# Patient Record
Sex: FEMALE | Race: WHITE | Employment: OTHER | URBAN - NONMETROPOLITAN AREA
[De-identification: names, ages, dates, MRNs, and addresses within clinical notes are randomized per-mention and may not be internally consistent; named-entity substitution may affect disease eponyms.]

---

## 2017-03-01 ENCOUNTER — OFFICE VISIT (OUTPATIENT)
Dept: NEPHROLOGY | Age: 72
End: 2017-03-01

## 2017-03-01 VITALS — SYSTOLIC BLOOD PRESSURE: 150 MMHG | WEIGHT: 145 LBS | DIASTOLIC BLOOD PRESSURE: 90 MMHG

## 2017-03-01 DIAGNOSIS — I10 ESSENTIAL HYPERTENSION: ICD-10-CM

## 2017-03-01 DIAGNOSIS — N05.2 MEMBRANOUS GLOMERULONEPHRITIS: ICD-10-CM

## 2017-03-01 DIAGNOSIS — R80.9 PROTEINURIA: ICD-10-CM

## 2017-03-01 DIAGNOSIS — N04.9 NEPHROTIC SYNDROME: Primary | ICD-10-CM

## 2017-03-01 PROCEDURE — G8421 BMI NOT CALCULATED: HCPCS | Performed by: INTERNAL MEDICINE

## 2017-03-01 PROCEDURE — G8400 PT W/DXA NO RESULTS DOC: HCPCS | Performed by: INTERNAL MEDICINE

## 2017-03-01 PROCEDURE — G8484 FLU IMMUNIZE NO ADMIN: HCPCS | Performed by: INTERNAL MEDICINE

## 2017-03-01 PROCEDURE — 1090F PRES/ABSN URINE INCON ASSESS: CPT | Performed by: INTERNAL MEDICINE

## 2017-03-01 PROCEDURE — 4040F PNEUMOC VAC/ADMIN/RCVD: CPT | Performed by: INTERNAL MEDICINE

## 2017-03-01 PROCEDURE — G8427 DOCREV CUR MEDS BY ELIG CLIN: HCPCS | Performed by: INTERNAL MEDICINE

## 2017-03-01 PROCEDURE — 99213 OFFICE O/P EST LOW 20 MIN: CPT | Performed by: INTERNAL MEDICINE

## 2017-03-01 PROCEDURE — 3014F SCREEN MAMMO DOC REV: CPT | Performed by: INTERNAL MEDICINE

## 2017-03-01 PROCEDURE — 1036F TOBACCO NON-USER: CPT | Performed by: INTERNAL MEDICINE

## 2017-03-01 PROCEDURE — 3017F COLORECTAL CA SCREEN DOC REV: CPT | Performed by: INTERNAL MEDICINE

## 2017-03-01 PROCEDURE — 1123F ACP DISCUSS/DSCN MKR DOCD: CPT | Performed by: INTERNAL MEDICINE

## 2017-03-01 RX ORDER — PREDNISONE 20 MG/1
20 TABLET ORAL DAILY
Qty: 60 TABLET | Refills: 5 | Status: SHIPPED | OUTPATIENT
Start: 2017-03-01 | End: 2017-06-21 | Stop reason: SDUPTHER

## 2017-06-21 ENCOUNTER — OFFICE VISIT (OUTPATIENT)
Dept: NEPHROLOGY | Age: 72
End: 2017-06-21

## 2017-06-21 VITALS
DIASTOLIC BLOOD PRESSURE: 90 MMHG | RESPIRATION RATE: 18 BRPM | HEART RATE: 76 BPM | SYSTOLIC BLOOD PRESSURE: 198 MMHG | WEIGHT: 135 LBS

## 2017-06-21 DIAGNOSIS — N04.9 NEPHROTIC SYNDROME: ICD-10-CM

## 2017-06-21 DIAGNOSIS — I10 ESSENTIAL HYPERTENSION: ICD-10-CM

## 2017-06-21 DIAGNOSIS — R80.9 PROTEINURIA: Primary | ICD-10-CM

## 2017-06-21 PROCEDURE — 3014F SCREEN MAMMO DOC REV: CPT | Performed by: INTERNAL MEDICINE

## 2017-06-21 PROCEDURE — 99214 OFFICE O/P EST MOD 30 MIN: CPT | Performed by: INTERNAL MEDICINE

## 2017-06-21 PROCEDURE — 1123F ACP DISCUSS/DSCN MKR DOCD: CPT | Performed by: INTERNAL MEDICINE

## 2017-06-21 PROCEDURE — G8421 BMI NOT CALCULATED: HCPCS | Performed by: INTERNAL MEDICINE

## 2017-06-21 PROCEDURE — G8427 DOCREV CUR MEDS BY ELIG CLIN: HCPCS | Performed by: INTERNAL MEDICINE

## 2017-06-21 PROCEDURE — 3017F COLORECTAL CA SCREEN DOC REV: CPT | Performed by: INTERNAL MEDICINE

## 2017-06-21 PROCEDURE — G8400 PT W/DXA NO RESULTS DOC: HCPCS | Performed by: INTERNAL MEDICINE

## 2017-06-21 PROCEDURE — 4040F PNEUMOC VAC/ADMIN/RCVD: CPT | Performed by: INTERNAL MEDICINE

## 2017-06-21 PROCEDURE — 1036F TOBACCO NON-USER: CPT | Performed by: INTERNAL MEDICINE

## 2017-06-21 PROCEDURE — 1090F PRES/ABSN URINE INCON ASSESS: CPT | Performed by: INTERNAL MEDICINE

## 2017-06-21 RX ORDER — PREDNISONE 10 MG/1
10 TABLET ORAL DAILY
Qty: 1 TABLET | Refills: 0
Start: 2017-06-21 | End: 2019-03-20

## 2017-08-09 DIAGNOSIS — R80.8 OTHER PROTEINURIA: Primary | ICD-10-CM

## 2017-08-09 RX ORDER — BUMETANIDE 1 MG/1
1 TABLET ORAL DAILY
Qty: 30 TABLET | Refills: 5 | Status: SHIPPED | OUTPATIENT
Start: 2017-08-09 | End: 2019-03-20 | Stop reason: ALTCHOICE

## 2017-09-12 ENCOUNTER — HOSPITAL ENCOUNTER (OUTPATIENT)
Age: 72
Discharge: HOME | End: 2017-09-12
Payer: MEDICARE

## 2017-09-12 LAB
ANION GAP SERPL CALCULATED.3IONS-SCNC: 21 MEQ/L (ref 8–16)
BUN BLDV-MCNC: 20 MG/DL (ref 7–22)
CALCIUM SERPL-MCNC: 8.9 MG/DL (ref 8.5–10.5)
CHLORIDE BLD-SCNC: 95 MEQ/L (ref 98–111)
CO2: 28 MEQ/L (ref 23–33)
CREAT SERPL-MCNC: 0.9 MG/DL (ref 0.4–1.2)
GFR SERPL CREATININE-BSD FRML MDRD: 61 ML/MIN/1.73M2
GLUCOSE BLD-MCNC: 95 MG/DL (ref 70–108)
HOURS COLLECTED: 24 HRS
POTASSIUM SERPL-SCNC: 3.5 MEQ/L (ref 3.5–5.2)
PROTEIN 24 HOUR URINE: 423 MG/24 HR (ref 42–225)
PROTEIN, URINE: 16.1 MG/DL
SODIUM BLD-SCNC: 144 MEQ/L (ref 135–145)
URINE VOLUME: 2630 ML

## 2017-09-12 PROCEDURE — 80048 BASIC METABOLIC PNL TOTAL CA: CPT

## 2017-09-12 PROCEDURE — 84156 ASSAY OF PROTEIN URINE: CPT

## 2017-09-12 PROCEDURE — 36415 COLL VENOUS BLD VENIPUNCTURE: CPT

## 2017-09-12 PROCEDURE — 81050 URINALYSIS VOLUME MEASURE: CPT

## 2017-09-20 ENCOUNTER — OFFICE VISIT (OUTPATIENT)
Dept: NEPHROLOGY | Age: 72
End: 2017-09-20
Payer: MEDICARE

## 2017-09-20 VITALS
RESPIRATION RATE: 16 BRPM | HEART RATE: 68 BPM | WEIGHT: 144 LBS | DIASTOLIC BLOOD PRESSURE: 80 MMHG | SYSTOLIC BLOOD PRESSURE: 148 MMHG

## 2017-09-20 DIAGNOSIS — N05.2 MEMBRANOUS GLOMERULONEPHRITIS: ICD-10-CM

## 2017-09-20 DIAGNOSIS — R80.1 PERSISTENT PROTEINURIA: Primary | ICD-10-CM

## 2017-09-20 DIAGNOSIS — I10 ESSENTIAL HYPERTENSION: ICD-10-CM

## 2017-09-20 PROCEDURE — 4040F PNEUMOC VAC/ADMIN/RCVD: CPT | Performed by: INTERNAL MEDICINE

## 2017-09-20 PROCEDURE — 99213 OFFICE O/P EST LOW 20 MIN: CPT | Performed by: INTERNAL MEDICINE

## 2017-09-20 PROCEDURE — 3017F COLORECTAL CA SCREEN DOC REV: CPT | Performed by: INTERNAL MEDICINE

## 2017-09-20 PROCEDURE — G8400 PT W/DXA NO RESULTS DOC: HCPCS | Performed by: INTERNAL MEDICINE

## 2017-09-20 PROCEDURE — 1090F PRES/ABSN URINE INCON ASSESS: CPT | Performed by: INTERNAL MEDICINE

## 2017-09-20 PROCEDURE — G8421 BMI NOT CALCULATED: HCPCS | Performed by: INTERNAL MEDICINE

## 2017-09-20 PROCEDURE — 3014F SCREEN MAMMO DOC REV: CPT | Performed by: INTERNAL MEDICINE

## 2017-09-20 PROCEDURE — 1036F TOBACCO NON-USER: CPT | Performed by: INTERNAL MEDICINE

## 2017-09-20 PROCEDURE — G8427 DOCREV CUR MEDS BY ELIG CLIN: HCPCS | Performed by: INTERNAL MEDICINE

## 2017-09-20 PROCEDURE — 1123F ACP DISCUSS/DSCN MKR DOCD: CPT | Performed by: INTERNAL MEDICINE

## 2018-03-15 ENCOUNTER — HOSPITAL ENCOUNTER (OUTPATIENT)
Age: 73
Discharge: HOME | End: 2018-03-15
Payer: MEDICARE

## 2018-03-15 DIAGNOSIS — R80.1 PERSISTENT PROTEINURIA: ICD-10-CM

## 2018-03-15 LAB
ANION GAP SERPL CALCULATED.3IONS-SCNC: 15 MEQ/L (ref 8–16)
BUN BLDV-MCNC: 24 MG/DL (ref 7–22)
CALCIUM SERPL-MCNC: 9.1 MG/DL (ref 8.5–10.5)
CHLORIDE BLD-SCNC: 101 MEQ/L (ref 98–111)
CO2: 25 MEQ/L (ref 23–33)
CREAT SERPL-MCNC: 1.2 MG/DL (ref 0.4–1.2)
CREATININE URINE: 124.9 MG/DL
GFR SERPL CREATININE-BSD FRML MDRD: 44 ML/MIN/1.73M2
GLUCOSE BLD-MCNC: 142 MG/DL (ref 70–108)
POTASSIUM SERPL-SCNC: 3.7 MEQ/L (ref 3.5–5.2)
PROT/CREAT RATIO, UR: 0.24
PROTEIN, URINE: 29.5 MG/DL
SODIUM BLD-SCNC: 141 MEQ/L (ref 135–145)

## 2018-03-15 PROCEDURE — 82570 ASSAY OF URINE CREATININE: CPT

## 2018-03-15 PROCEDURE — 36415 COLL VENOUS BLD VENIPUNCTURE: CPT

## 2018-03-15 PROCEDURE — 80048 BASIC METABOLIC PNL TOTAL CA: CPT

## 2018-03-15 PROCEDURE — 84156 ASSAY OF PROTEIN URINE: CPT

## 2018-03-21 ENCOUNTER — OFFICE VISIT (OUTPATIENT)
Dept: NEPHROLOGY | Age: 73
End: 2018-03-21
Payer: MEDICARE

## 2018-03-21 VITALS
HEART RATE: 68 BPM | SYSTOLIC BLOOD PRESSURE: 138 MMHG | RESPIRATION RATE: 18 BRPM | WEIGHT: 153 LBS | DIASTOLIC BLOOD PRESSURE: 80 MMHG

## 2018-03-21 DIAGNOSIS — I10 ESSENTIAL HYPERTENSION: ICD-10-CM

## 2018-03-21 DIAGNOSIS — R80.1 PERSISTENT PROTEINURIA: Primary | ICD-10-CM

## 2018-03-21 DIAGNOSIS — N05.2 MEMBRANOUS GLOMERULONEPHRITIS: ICD-10-CM

## 2018-03-21 PROCEDURE — 1036F TOBACCO NON-USER: CPT | Performed by: INTERNAL MEDICINE

## 2018-03-21 PROCEDURE — G8421 BMI NOT CALCULATED: HCPCS | Performed by: INTERNAL MEDICINE

## 2018-03-21 PROCEDURE — 1123F ACP DISCUSS/DSCN MKR DOCD: CPT | Performed by: INTERNAL MEDICINE

## 2018-03-21 PROCEDURE — 1090F PRES/ABSN URINE INCON ASSESS: CPT | Performed by: INTERNAL MEDICINE

## 2018-03-21 PROCEDURE — G8484 FLU IMMUNIZE NO ADMIN: HCPCS | Performed by: INTERNAL MEDICINE

## 2018-03-21 PROCEDURE — 4040F PNEUMOC VAC/ADMIN/RCVD: CPT | Performed by: INTERNAL MEDICINE

## 2018-03-21 PROCEDURE — G8427 DOCREV CUR MEDS BY ELIG CLIN: HCPCS | Performed by: INTERNAL MEDICINE

## 2018-03-21 PROCEDURE — 99213 OFFICE O/P EST LOW 20 MIN: CPT | Performed by: INTERNAL MEDICINE

## 2018-03-21 PROCEDURE — 3017F COLORECTAL CA SCREEN DOC REV: CPT | Performed by: INTERNAL MEDICINE

## 2018-03-21 PROCEDURE — 3014F SCREEN MAMMO DOC REV: CPT | Performed by: INTERNAL MEDICINE

## 2018-03-21 PROCEDURE — G8400 PT W/DXA NO RESULTS DOC: HCPCS | Performed by: INTERNAL MEDICINE

## 2018-03-21 NOTE — PROGRESS NOTES
0    ADVAIR DISKUS 250-50 MCG/DOSE AEPB       SPIRIVA HANDIHALER 18 MCG inhalation capsule       potassium chloride SA (K-DUR;KLOR-CON M) 20 MEQ tablet Take 1 tablet by mouth daily 30 tablet 1    losartan (COZAAR) 100 MG tablet Take 1 tablet by mouth daily 30 tablet 3    metoprolol (LOPRESSOR) 25 MG tablet Take 25 mg by mouth 3 times daily       folic acid (FOLVITE) 1 MG tablet Take 1 mg by mouth daily. No current facility-administered medications for this visit. Lab Results:    CBC:   Lab Results   Component Value Date    WBC 11.5 (H) 05/15/2013    HGB 14.8 05/15/2013    HCT 44.3 05/15/2013    MCV 98.4 05/15/2013     05/15/2013     BMP:    Lab Results   Component Value Date     03/15/2018     09/12/2017     02/17/2017    K 3.7 03/15/2018    K 3.5 09/12/2017    K 4.3 02/17/2017     03/15/2018    CL 95 (L) 09/12/2017     02/17/2017    CO2 25 03/15/2018    CO2 28 09/12/2017    CO2 27 02/17/2017    BUN 24 (H) 03/15/2018    BUN 20 09/12/2017    BUN 21 02/17/2017    CREATININE 1.2 03/15/2018    CREATININE 0.9 09/12/2017    CREATININE 0.8 02/17/2017    GLUCOSE 142 (H) 03/15/2018    GLUCOSE 95 09/12/2017    GLUCOSE 95 02/17/2017      Hepatic: No results found for: AST, ALT, ALB, BILITOT, ALKPHOS  BNP: No results found for: BNP  Lipids:   Lab Results   Component Value Date    CHOL 199 08/09/2016     08/09/2016     INR: No results found for: INR  URINE:   Lab Results   Component Value Date    PROTUR 29.5 03/15/2018     No results found for: NITRU, COLORU, PHUR, LABCAST, WBCUA, RBCUA, MUCUS, TRICHOMONAS, YEAST, BACTERIA, CLARITYU, SPECGRAV, LEUKOCYTESUR, UROBILINOGEN, BILIRUBINUR, BLOODU, GLUCOSEU, KETUA, AMORPHOUS   Microalbumen/Creatinine ratio:  No components found for: RUCREAT    Objective:   Vitals: /80   Pulse 68   Resp 18   Wt 153 lb (69.4 kg)      Constitutional:  Alert, awake, no apparent distress  Skin:normal  HEENT:Pupils are reactive . Throat

## 2019-03-13 ENCOUNTER — HOSPITAL ENCOUNTER (OUTPATIENT)
Age: 74
Discharge: HOME | End: 2019-03-13
Payer: MEDICARE

## 2019-03-13 DIAGNOSIS — R80.1 PERSISTENT PROTEINURIA: ICD-10-CM

## 2019-03-13 DIAGNOSIS — I10 ESSENTIAL HYPERTENSION: ICD-10-CM

## 2019-03-13 LAB
ANION GAP SERPL CALCULATED.3IONS-SCNC: 14 MEQ/L (ref 8–16)
BUN BLDV-MCNC: 26 MG/DL (ref 7–22)
CALCIUM SERPL-MCNC: 9.2 MG/DL (ref 8.5–10.5)
CHLORIDE BLD-SCNC: 103 MEQ/L (ref 98–111)
CO2: 24 MEQ/L (ref 23–33)
CREAT SERPL-MCNC: 0.8 MG/DL (ref 0.4–1.2)
CREATININE URINE: 94.1 MG/DL
GFR SERPL CREATININE-BSD FRML MDRD: 70 ML/MIN/1.73M2
GLUCOSE BLD-MCNC: 84 MG/DL (ref 70–108)
POTASSIUM SERPL-SCNC: 4 MEQ/L (ref 3.5–5.2)
PROT/CREAT RATIO, UR: 0.39
PROTEIN, URINE: 36.4 MG/DL
SODIUM BLD-SCNC: 141 MEQ/L (ref 135–145)

## 2019-03-13 PROCEDURE — 84156 ASSAY OF PROTEIN URINE: CPT

## 2019-03-13 PROCEDURE — 36415 COLL VENOUS BLD VENIPUNCTURE: CPT

## 2019-03-13 PROCEDURE — 80048 BASIC METABOLIC PNL TOTAL CA: CPT

## 2019-03-13 PROCEDURE — 82570 ASSAY OF URINE CREATININE: CPT

## 2019-03-20 ENCOUNTER — OFFICE VISIT (OUTPATIENT)
Dept: NEPHROLOGY | Age: 74
End: 2019-03-20
Payer: MEDICARE

## 2019-03-20 VITALS
SYSTOLIC BLOOD PRESSURE: 116 MMHG | DIASTOLIC BLOOD PRESSURE: 82 MMHG | WEIGHT: 154.2 LBS | OXYGEN SATURATION: 96 % | HEART RATE: 77 BPM

## 2019-03-20 DIAGNOSIS — N05.2 MEMBRANOUS GLOMERULONEPHRITIS: ICD-10-CM

## 2019-03-20 DIAGNOSIS — R80.1 PERSISTENT PROTEINURIA: Primary | ICD-10-CM

## 2019-03-20 DIAGNOSIS — I10 ESSENTIAL HYPERTENSION: ICD-10-CM

## 2019-03-20 PROCEDURE — G8400 PT W/DXA NO RESULTS DOC: HCPCS | Performed by: INTERNAL MEDICINE

## 2019-03-20 PROCEDURE — 99213 OFFICE O/P EST LOW 20 MIN: CPT | Performed by: INTERNAL MEDICINE

## 2019-03-20 PROCEDURE — 3017F COLORECTAL CA SCREEN DOC REV: CPT | Performed by: INTERNAL MEDICINE

## 2019-03-20 PROCEDURE — 4040F PNEUMOC VAC/ADMIN/RCVD: CPT | Performed by: INTERNAL MEDICINE

## 2019-03-20 PROCEDURE — G8421 BMI NOT CALCULATED: HCPCS | Performed by: INTERNAL MEDICINE

## 2019-03-20 PROCEDURE — 1090F PRES/ABSN URINE INCON ASSESS: CPT | Performed by: INTERNAL MEDICINE

## 2019-03-20 PROCEDURE — 1036F TOBACCO NON-USER: CPT | Performed by: INTERNAL MEDICINE

## 2019-03-20 PROCEDURE — G8427 DOCREV CUR MEDS BY ELIG CLIN: HCPCS | Performed by: INTERNAL MEDICINE

## 2019-03-20 PROCEDURE — 1101F PT FALLS ASSESS-DOCD LE1/YR: CPT | Performed by: INTERNAL MEDICINE

## 2019-03-20 PROCEDURE — G8484 FLU IMMUNIZE NO ADMIN: HCPCS | Performed by: INTERNAL MEDICINE

## 2019-03-20 PROCEDURE — 1123F ACP DISCUSS/DSCN MKR DOCD: CPT | Performed by: INTERNAL MEDICINE

## 2019-03-20 RX ORDER — AMLODIPINE BESYLATE 5 MG/1
5 TABLET ORAL DAILY
COMMUNITY
Start: 2019-03-10

## 2019-03-20 RX ORDER — PREDNISONE 1 MG/1
5 TABLET ORAL DAILY
COMMUNITY
End: 2019-09-18 | Stop reason: ALTCHOICE

## 2019-03-20 RX ORDER — FAMOTIDINE 40 MG/1
40 TABLET, FILM COATED ORAL DAILY
COMMUNITY
Start: 2019-03-10 | End: 2020-10-28

## 2019-03-20 RX ORDER — CITALOPRAM 20 MG/1
20 TABLET ORAL DAILY
COMMUNITY
Start: 2019-03-11

## 2019-03-20 RX ORDER — OMEPRAZOLE 20 MG/1
20 CAPSULE, DELAYED RELEASE ORAL DAILY
COMMUNITY
Start: 2019-03-10 | End: 2020-10-28

## 2019-03-20 RX ORDER — UMECLIDINIUM 62.5 UG/1
AEROSOL, POWDER ORAL
Status: ON HOLD | COMMUNITY
Start: 2019-03-10 | End: 2020-07-16 | Stop reason: SDUPTHER

## 2019-09-09 ENCOUNTER — HOSPITAL ENCOUNTER (OUTPATIENT)
Age: 74
Discharge: HOME | End: 2019-09-09
Payer: MEDICARE

## 2019-09-09 DIAGNOSIS — I10 ESSENTIAL HYPERTENSION: ICD-10-CM

## 2019-09-09 DIAGNOSIS — R80.1 PERSISTENT PROTEINURIA: ICD-10-CM

## 2019-09-09 LAB
ANION GAP SERPL CALCULATED.3IONS-SCNC: 16 MEQ/L (ref 8–16)
BUN BLDV-MCNC: 22 MG/DL (ref 7–22)
CALCIUM SERPL-MCNC: 9.6 MG/DL (ref 8.5–10.5)
CHLORIDE BLD-SCNC: 100 MEQ/L (ref 98–111)
CO2: 24 MEQ/L (ref 23–33)
CREAT SERPL-MCNC: 0.7 MG/DL (ref 0.4–1.2)
CREATININE URINE: 92.1 MG/DL
GFR SERPL CREATININE-BSD FRML MDRD: 82 ML/MIN/1.73M2
GLUCOSE BLD-MCNC: 112 MG/DL (ref 70–108)
POTASSIUM SERPL-SCNC: 4.1 MEQ/L (ref 3.5–5.2)
PROT/CREAT RATIO, UR: 0.33
PROTEIN, URINE: 30.8 MG/DL
SODIUM BLD-SCNC: 140 MEQ/L (ref 135–145)

## 2019-09-09 PROCEDURE — 80048 BASIC METABOLIC PNL TOTAL CA: CPT

## 2019-09-09 PROCEDURE — 82570 ASSAY OF URINE CREATININE: CPT

## 2019-09-09 PROCEDURE — 36415 COLL VENOUS BLD VENIPUNCTURE: CPT

## 2019-09-09 PROCEDURE — 84156 ASSAY OF PROTEIN URINE: CPT

## 2019-09-18 ENCOUNTER — OFFICE VISIT (OUTPATIENT)
Dept: NEPHROLOGY | Age: 74
End: 2019-09-18
Payer: MEDICARE

## 2019-09-18 VITALS
HEIGHT: 64 IN | BODY MASS INDEX: 24.41 KG/M2 | HEART RATE: 68 BPM | WEIGHT: 143 LBS | DIASTOLIC BLOOD PRESSURE: 80 MMHG | SYSTOLIC BLOOD PRESSURE: 138 MMHG

## 2019-09-18 DIAGNOSIS — I10 ESSENTIAL HYPERTENSION: ICD-10-CM

## 2019-09-18 DIAGNOSIS — N05.2 MEMBRANOUS GLOMERULONEPHRITIS: ICD-10-CM

## 2019-09-18 DIAGNOSIS — R80.1 PERSISTENT PROTEINURIA: Primary | ICD-10-CM

## 2019-09-18 PROCEDURE — G8420 CALC BMI NORM PARAMETERS: HCPCS | Performed by: INTERNAL MEDICINE

## 2019-09-18 PROCEDURE — 1123F ACP DISCUSS/DSCN MKR DOCD: CPT | Performed by: INTERNAL MEDICINE

## 2019-09-18 PROCEDURE — G8400 PT W/DXA NO RESULTS DOC: HCPCS | Performed by: INTERNAL MEDICINE

## 2019-09-18 PROCEDURE — 3017F COLORECTAL CA SCREEN DOC REV: CPT | Performed by: INTERNAL MEDICINE

## 2019-09-18 PROCEDURE — G8427 DOCREV CUR MEDS BY ELIG CLIN: HCPCS | Performed by: INTERNAL MEDICINE

## 2019-09-18 PROCEDURE — 1090F PRES/ABSN URINE INCON ASSESS: CPT | Performed by: INTERNAL MEDICINE

## 2019-09-18 PROCEDURE — 1036F TOBACCO NON-USER: CPT | Performed by: INTERNAL MEDICINE

## 2019-09-18 PROCEDURE — 99213 OFFICE O/P EST LOW 20 MIN: CPT | Performed by: INTERNAL MEDICINE

## 2019-09-18 PROCEDURE — 4040F PNEUMOC VAC/ADMIN/RCVD: CPT | Performed by: INTERNAL MEDICINE

## 2019-09-18 RX ORDER — LORAZEPAM 2 MG/1
2 TABLET ORAL EVERY 6 HOURS PRN
COMMUNITY

## 2019-09-18 RX ORDER — METHYLPREDNISOLONE 4 MG/1
2 TABLET ORAL DAILY
Status: ON HOLD | COMMUNITY
End: 2020-07-16 | Stop reason: ALTCHOICE

## 2019-09-18 NOTE — PROGRESS NOTES
Renal Progress Note    Assessment and Plan:      Diagnosis Orders   1. Persistent proteinuria     2. Essential hypertension     3. Membranous glomerulonephritis       PLAN:   Lab results discussed with the patient. She understood. Serum creatinine is good at 0.8 mg/dL. Urine protein with creatinine ratio is improved to 300 mg from 390 mg before. Medications reviewed. Discontinue omeprazole since patient is also on famotidine. Return visit in 12 months with labs. Patient Active Problem List   Diagnosis    Nephrotic syndrome    HTN (hypertension)    Lower extremity edema    HLD (hyperlipidemia)    Membranous glomerulonephritis    Proteinuria    Anemia    Dyslipidemia    Edema of both legs    Dyslipidemia    Hyponatremia       Subjective:   Chief complaint:  Chief Complaint   Patient presents with    Chronic Kidney Disease      HPI:This is a follow up visit for Mrs Ericka Miguel here today for a return appointment. See her for proteinuria . Was last seen about 6 months ago. .  Urine protein creatinine ratio was 0.39 g. Today it is 0.33 g. Doing well with no complaints.     ROS:Constitutional: negative  Eyes: negative  Ears, nose, mouth, throat, and face: negative  Respiratory: negative  Cardiovascular: negative  Gastrointestinal: negative  Genitourinary:negative  Integument/breast: negative  Hematologic/lymphatic: negative  Musculoskeletal:positive for arthralgias and stiff joints  Neurological: negative  Behavioral/Psych: negative  Endocrine: negative  Allergic/Immunologic: negative     Medications:     Current Outpatient Medications   Medication Sig Dispense Refill    amLODIPine (NORVASC) 5 MG tablet Take 5 mg by mouth daily       citalopram (CELEXA) 20 MG tablet Take 20 mg by mouth daily       famotidine (PEPCID) 40 MG tablet Take 40 mg by mouth daily       omeprazole (PRILOSEC) 20 MG delayed release capsule Take 20 mg by mouth Daily       INCRUSE ELLIPTA 62.5 MCG/INH AEPB      

## 2020-03-25 PROBLEM — E78.5 HLD (HYPERLIPIDEMIA): Status: RESOLVED | Noted: 2020-03-25 | Resolved: 2020-03-24

## 2020-03-25 PROBLEM — E78.5 DYSLIPIDEMIA: Status: RESOLVED | Noted: 2020-03-25 | Resolved: 2020-03-24

## 2020-07-12 ENCOUNTER — HOSPITAL ENCOUNTER (INPATIENT)
Age: 75
LOS: 2 days | Discharge: HOME HEALTH | DRG: 190 | End: 2020-07-16
Attending: INTERNAL MEDICINE | Admitting: INTERNAL MEDICINE
Payer: MEDICARE

## 2020-07-12 ENCOUNTER — APPOINTMENT (OUTPATIENT)
Dept: GENERAL RADIOLOGY | Age: 75
DRG: 190 | End: 2020-07-12
Payer: MEDICARE

## 2020-07-12 PROBLEM — J44.1 COPD EXACERBATION (HCC): Status: ACTIVE | Noted: 2020-07-12

## 2020-07-12 LAB
ALBUMIN SERPL-MCNC: 4.1 G/DL (ref 3.5–5.1)
ALLEN TEST: POSITIVE
ALP BLD-CCNC: 82 U/L (ref 38–126)
ALT SERPL-CCNC: 26 U/L (ref 11–66)
ANION GAP SERPL CALCULATED.3IONS-SCNC: 13 MEQ/L (ref 8–16)
AST SERPL-CCNC: 35 U/L (ref 5–40)
BASE EXCESS (CALCULATED): -0.1 MMOL/L (ref -2.5–2.5)
BASOPHILS # BLD: 0.6 %
BASOPHILS ABSOLUTE: 0.1 THOU/MM3 (ref 0–0.1)
BILIRUB SERPL-MCNC: 0.2 MG/DL (ref 0.3–1.2)
BILIRUBIN DIRECT: < 0.2 MG/DL (ref 0–0.3)
BUN BLDV-MCNC: 22 MG/DL (ref 7–22)
CALCIUM SERPL-MCNC: 8.6 MG/DL (ref 8.5–10.5)
CHLORIDE BLD-SCNC: 98 MEQ/L (ref 98–111)
CO2: 23 MEQ/L (ref 23–33)
COLLECTED BY:: ABNORMAL
CREAT SERPL-MCNC: 0.7 MG/DL (ref 0.4–1.2)
DEVICE: ABNORMAL
EKG ATRIAL RATE: 101 BPM
EKG P AXIS: 106 DEGREES
EKG P-R INTERVAL: 188 MS
EKG Q-T INTERVAL: 356 MS
EKG QRS DURATION: 80 MS
EKG QTC CALCULATION (BAZETT): 461 MS
EKG R AXIS: 60 DEGREES
EKG T AXIS: 93 DEGREES
EKG VENTRICULAR RATE: 101 BPM
EOSINOPHIL # BLD: 2.4 %
EOSINOPHILS ABSOLUTE: 0.3 THOU/MM3 (ref 0–0.4)
ERYTHROCYTE [DISTWIDTH] IN BLOOD BY AUTOMATED COUNT: 11.5 % (ref 11.5–14.5)
ERYTHROCYTE [DISTWIDTH] IN BLOOD BY AUTOMATED COUNT: 43.5 FL (ref 35–45)
GFR SERPL CREATININE-BSD FRML MDRD: 81 ML/MIN/1.73M2
GLUCOSE BLD-MCNC: 123 MG/DL (ref 70–108)
HCO3: 26 MMOL/L (ref 23–28)
HCT VFR BLD CALC: 40.3 % (ref 37–47)
HEMOGLOBIN: 13.2 GM/DL (ref 12–16)
IFIO2: 3
IMMATURE GRANS (ABS): 0.09 THOU/MM3 (ref 0–0.07)
IMMATURE GRANULOCYTES: 0.8 %
INR BLD: 0.89 (ref 0.85–1.13)
LYMPHOCYTES # BLD: 24.7 %
LYMPHOCYTES ABSOLUTE: 2.9 THOU/MM3 (ref 1–4.8)
MCH RBC QN AUTO: 33.7 PG (ref 26–33)
MCHC RBC AUTO-ENTMCNC: 32.8 GM/DL (ref 32.2–35.5)
MCV RBC AUTO: 102.8 FL (ref 81–99)
MONOCYTES # BLD: 8.3 %
MONOCYTES ABSOLUTE: 1 THOU/MM3 (ref 0.4–1.3)
NUCLEATED RED BLOOD CELLS: 0 /100 WBC
O2 SATURATION: 92 %
OSMOLALITY CALCULATION: 272.9 MOSMOL/KG (ref 275–300)
PCO2: 45 MMHG (ref 35–45)
PH BLOOD GAS: 7.36 (ref 7.35–7.45)
PLATELET # BLD: 310 THOU/MM3 (ref 130–400)
PMV BLD AUTO: 8.5 FL (ref 9.4–12.4)
PO2: 67 MMHG (ref 71–104)
POTASSIUM SERPL-SCNC: 4.2 MEQ/L (ref 3.5–5.2)
RBC # BLD: 3.92 MILL/MM3 (ref 4.2–5.4)
REASON FOR REJECTION: NORMAL
REJECTED TEST: NORMAL
SARS-COV-2, NAAT: NOT DETECTED
SEG NEUTROPHILS: 63.2 %
SEGMENTED NEUTROPHILS ABSOLUTE COUNT: 7.5 THOU/MM3 (ref 1.8–7.7)
SODIUM BLD-SCNC: 134 MEQ/L (ref 135–145)
SOURCE, BLOOD GAS: ABNORMAL
TOTAL PROTEIN: 6.7 G/DL (ref 6.1–8)
WBC # BLD: 11.9 THOU/MM3 (ref 4.8–10.8)

## 2020-07-12 PROCEDURE — 93005 ELECTROCARDIOGRAM TRACING: CPT | Performed by: PHYSICIAN ASSISTANT

## 2020-07-12 PROCEDURE — 94761 N-INVAS EAR/PLS OXIMETRY MLT: CPT

## 2020-07-12 PROCEDURE — 80053 COMPREHEN METABOLIC PANEL: CPT

## 2020-07-12 PROCEDURE — 36600 WITHDRAWAL OF ARTERIAL BLOOD: CPT

## 2020-07-12 PROCEDURE — 6360000002 HC RX W HCPCS: Performed by: INTERNAL MEDICINE

## 2020-07-12 PROCEDURE — 82248 BILIRUBIN DIRECT: CPT

## 2020-07-12 PROCEDURE — G0378 HOSPITAL OBSERVATION PER HR: HCPCS

## 2020-07-12 PROCEDURE — 85025 COMPLETE CBC W/AUTO DIFF WBC: CPT

## 2020-07-12 PROCEDURE — 96372 THER/PROPH/DIAG INJ SC/IM: CPT

## 2020-07-12 PROCEDURE — 36415 COLL VENOUS BLD VENIPUNCTURE: CPT

## 2020-07-12 PROCEDURE — 6370000000 HC RX 637 (ALT 250 FOR IP): Performed by: INTERNAL MEDICINE

## 2020-07-12 PROCEDURE — 2580000003 HC RX 258: Performed by: INTERNAL MEDICINE

## 2020-07-12 PROCEDURE — 94640 AIRWAY INHALATION TREATMENT: CPT

## 2020-07-12 PROCEDURE — 6360000002 HC RX W HCPCS: Performed by: PHYSICIAN ASSISTANT

## 2020-07-12 PROCEDURE — 85610 PROTHROMBIN TIME: CPT

## 2020-07-12 PROCEDURE — 94645 CONT INHLJ TX EACH ADDL HOUR: CPT

## 2020-07-12 PROCEDURE — 96374 THER/PROPH/DIAG INJ IV PUSH: CPT

## 2020-07-12 PROCEDURE — 99284 EMERGENCY DEPT VISIT MOD MDM: CPT

## 2020-07-12 PROCEDURE — 96376 TX/PRO/DX INJ SAME DRUG ADON: CPT

## 2020-07-12 PROCEDURE — 6370000000 HC RX 637 (ALT 250 FOR IP): Performed by: PHYSICIAN ASSISTANT

## 2020-07-12 PROCEDURE — 82803 BLOOD GASES ANY COMBINATION: CPT

## 2020-07-12 PROCEDURE — U0002 COVID-19 LAB TEST NON-CDC: HCPCS

## 2020-07-12 PROCEDURE — 71045 X-RAY EXAM CHEST 1 VIEW: CPT

## 2020-07-12 PROCEDURE — 2700000000 HC OXYGEN THERAPY PER DAY

## 2020-07-12 PROCEDURE — 94644 CONT INHLJ TX 1ST HOUR: CPT

## 2020-07-12 RX ORDER — BUDESONIDE AND FORMOTEROL FUMARATE DIHYDRATE 160; 4.5 UG/1; UG/1
2 AEROSOL RESPIRATORY (INHALATION) 2 TIMES DAILY
Status: DISCONTINUED | OUTPATIENT
Start: 2020-07-12 | End: 2020-07-13

## 2020-07-12 RX ORDER — SODIUM CHLORIDE 0.9 % (FLUSH) 0.9 %
10 SYRINGE (ML) INJECTION PRN
Status: DISCONTINUED | OUTPATIENT
Start: 2020-07-12 | End: 2020-07-16 | Stop reason: HOSPADM

## 2020-07-12 RX ORDER — POLYETHYLENE GLYCOL 3350 17 G/17G
17 POWDER, FOR SOLUTION ORAL DAILY PRN
Status: DISCONTINUED | OUTPATIENT
Start: 2020-07-12 | End: 2020-07-16 | Stop reason: HOSPADM

## 2020-07-12 RX ORDER — PREDNISONE 20 MG/1
40 TABLET ORAL DAILY
Status: DISCONTINUED | OUTPATIENT
Start: 2020-07-14 | End: 2020-07-13

## 2020-07-12 RX ORDER — LOSARTAN POTASSIUM 100 MG/1
100 TABLET ORAL DAILY
Status: DISCONTINUED | OUTPATIENT
Start: 2020-07-12 | End: 2020-07-16 | Stop reason: HOSPADM

## 2020-07-12 RX ORDER — IPRATROPIUM BROMIDE AND ALBUTEROL SULFATE 2.5; .5 MG/3ML; MG/3ML
2 SOLUTION RESPIRATORY (INHALATION) ONCE
Status: COMPLETED | OUTPATIENT
Start: 2020-07-12 | End: 2020-07-12

## 2020-07-12 RX ORDER — IPRATROPIUM BROMIDE AND ALBUTEROL SULFATE 2.5; .5 MG/3ML; MG/3ML
1 SOLUTION RESPIRATORY (INHALATION)
Status: DISCONTINUED | OUTPATIENT
Start: 2020-07-12 | End: 2020-07-16 | Stop reason: HOSPADM

## 2020-07-12 RX ORDER — METHYLPREDNISOLONE SODIUM SUCCINATE 40 MG/ML
40 INJECTION, POWDER, LYOPHILIZED, FOR SOLUTION INTRAMUSCULAR; INTRAVENOUS EVERY 6 HOURS
Status: DISCONTINUED | OUTPATIENT
Start: 2020-07-12 | End: 2020-07-13

## 2020-07-12 RX ORDER — ACETAMINOPHEN 325 MG/1
650 TABLET ORAL EVERY 6 HOURS PRN
Status: DISCONTINUED | OUTPATIENT
Start: 2020-07-12 | End: 2020-07-16 | Stop reason: HOSPADM

## 2020-07-12 RX ORDER — SODIUM CHLORIDE 0.9 % (FLUSH) 0.9 %
10 SYRINGE (ML) INJECTION EVERY 12 HOURS SCHEDULED
Status: DISCONTINUED | OUTPATIENT
Start: 2020-07-12 | End: 2020-07-16 | Stop reason: HOSPADM

## 2020-07-12 RX ORDER — ONDANSETRON 2 MG/ML
4 INJECTION INTRAMUSCULAR; INTRAVENOUS EVERY 6 HOURS PRN
Status: DISCONTINUED | OUTPATIENT
Start: 2020-07-12 | End: 2020-07-16 | Stop reason: HOSPADM

## 2020-07-12 RX ORDER — PANTOPRAZOLE SODIUM 40 MG/1
40 TABLET, DELAYED RELEASE ORAL
Status: DISCONTINUED | OUTPATIENT
Start: 2020-07-12 | End: 2020-07-16 | Stop reason: HOSPADM

## 2020-07-12 RX ORDER — PROMETHAZINE HYDROCHLORIDE 25 MG/1
12.5 TABLET ORAL EVERY 6 HOURS PRN
Status: DISCONTINUED | OUTPATIENT
Start: 2020-07-12 | End: 2020-07-16 | Stop reason: HOSPADM

## 2020-07-12 RX ORDER — AMLODIPINE BESYLATE 5 MG/1
5 TABLET ORAL DAILY
Status: DISCONTINUED | OUTPATIENT
Start: 2020-07-12 | End: 2020-07-16 | Stop reason: HOSPADM

## 2020-07-12 RX ORDER — FAMOTIDINE 20 MG/1
40 TABLET, FILM COATED ORAL DAILY
Status: DISCONTINUED | OUTPATIENT
Start: 2020-07-12 | End: 2020-07-12

## 2020-07-12 RX ORDER — ACETAMINOPHEN 650 MG/1
650 SUPPOSITORY RECTAL EVERY 6 HOURS PRN
Status: DISCONTINUED | OUTPATIENT
Start: 2020-07-12 | End: 2020-07-16 | Stop reason: HOSPADM

## 2020-07-12 RX ORDER — FOLIC ACID 1 MG/1
1 TABLET ORAL DAILY
Status: DISCONTINUED | OUTPATIENT
Start: 2020-07-12 | End: 2020-07-16 | Stop reason: HOSPADM

## 2020-07-12 RX ORDER — CITALOPRAM 20 MG/1
20 TABLET ORAL DAILY
Status: DISCONTINUED | OUTPATIENT
Start: 2020-07-12 | End: 2020-07-16 | Stop reason: HOSPADM

## 2020-07-12 RX ADMIN — IPRATROPIUM BROMIDE AND ALBUTEROL SULFATE 1 AMPULE: .5; 3 SOLUTION RESPIRATORY (INHALATION) at 12:43

## 2020-07-12 RX ADMIN — Medication 10 ML: at 16:45

## 2020-07-12 RX ADMIN — METHYLPREDNISOLONE SODIUM SUCCINATE 40 MG: 40 INJECTION, POWDER, FOR SOLUTION INTRAMUSCULAR; INTRAVENOUS at 23:45

## 2020-07-12 RX ADMIN — METHYLPREDNISOLONE SODIUM SUCCINATE 40 MG: 40 INJECTION, POWDER, FOR SOLUTION INTRAMUSCULAR; INTRAVENOUS at 16:45

## 2020-07-12 RX ADMIN — IPRATROPIUM BROMIDE AND ALBUTEROL SULFATE 1 AMPULE: .5; 3 SOLUTION RESPIRATORY (INHALATION) at 20:25

## 2020-07-12 RX ADMIN — ALBUTEROL SULFATE 15 MG/HR: 2.5 SOLUTION RESPIRATORY (INHALATION) at 05:44

## 2020-07-12 RX ADMIN — IPRATROPIUM BROMIDE AND ALBUTEROL SULFATE 2 AMPULE: .5; 3 SOLUTION RESPIRATORY (INHALATION) at 04:17

## 2020-07-12 RX ADMIN — FOLIC ACID 1 MG: 1 TABLET ORAL at 11:38

## 2020-07-12 RX ADMIN — PANTOPRAZOLE SODIUM 40 MG: 40 TABLET, DELAYED RELEASE ORAL at 09:59

## 2020-07-12 RX ADMIN — ACETAMINOPHEN 650 MG: 325 TABLET ORAL at 09:59

## 2020-07-12 RX ADMIN — AMLODIPINE BESYLATE 5 MG: 5 TABLET ORAL at 11:38

## 2020-07-12 RX ADMIN — SODIUM CHLORIDE, PRESERVATIVE FREE 10 ML: 5 INJECTION INTRAVENOUS at 20:07

## 2020-07-12 RX ADMIN — ENOXAPARIN SODIUM 40 MG: 40 INJECTION SUBCUTANEOUS at 09:59

## 2020-07-12 RX ADMIN — Medication 2 PUFF: at 20:25

## 2020-07-12 RX ADMIN — SODIUM CHLORIDE, PRESERVATIVE FREE 10 ML: 5 INJECTION INTRAVENOUS at 10:01

## 2020-07-12 RX ADMIN — ALBUTEROL SULFATE 15 MG/HR: 2.5 SOLUTION RESPIRATORY (INHALATION) at 04:51

## 2020-07-12 RX ADMIN — METOPROLOL TARTRATE 25 MG: 25 TABLET ORAL at 11:38

## 2020-07-12 RX ADMIN — METHYLPREDNISOLONE SODIUM SUCCINATE 40 MG: 40 INJECTION, POWDER, FOR SOLUTION INTRAMUSCULAR; INTRAVENOUS at 11:39

## 2020-07-12 RX ADMIN — METOPROLOL TARTRATE 25 MG: 25 TABLET ORAL at 20:06

## 2020-07-12 RX ADMIN — IPRATROPIUM BROMIDE AND ALBUTEROL SULFATE 1 AMPULE: .5; 3 SOLUTION RESPIRATORY (INHALATION) at 16:19

## 2020-07-12 RX ADMIN — LOSARTAN POTASSIUM 100 MG: 100 TABLET, FILM COATED ORAL at 11:38

## 2020-07-12 RX ADMIN — CITALOPRAM 20 MG: 20 TABLET, FILM COATED ORAL at 11:38

## 2020-07-12 ASSESSMENT — PAIN DESCRIPTION - DESCRIPTORS: DESCRIPTORS: HEADACHE

## 2020-07-12 ASSESSMENT — PAIN DESCRIPTION - PROGRESSION: CLINICAL_PROGRESSION: GRADUALLY WORSENING

## 2020-07-12 ASSESSMENT — PAIN SCALES - GENERAL
PAINLEVEL_OUTOF10: 0
PAINLEVEL_OUTOF10: 3
PAINLEVEL_OUTOF10: 0

## 2020-07-12 ASSESSMENT — PAIN - FUNCTIONAL ASSESSMENT: PAIN_FUNCTIONAL_ASSESSMENT: ACTIVITIES ARE NOT PREVENTED

## 2020-07-12 ASSESSMENT — PAIN DESCRIPTION - FREQUENCY: FREQUENCY: CONTINUOUS

## 2020-07-12 ASSESSMENT — PAIN DESCRIPTION - LOCATION: LOCATION: HEAD

## 2020-07-12 ASSESSMENT — PAIN DESCRIPTION - PAIN TYPE: TYPE: ACUTE PAIN

## 2020-07-12 ASSESSMENT — PAIN DESCRIPTION - ONSET: ONSET: ON-GOING

## 2020-07-12 ASSESSMENT — PAIN DESCRIPTION - ORIENTATION: ORIENTATION: MID

## 2020-07-12 NOTE — PLAN OF CARE
Problem: Impaired respiratory status  Goal: Clear lung sounds  Description: Clear lung sounds  Outcome: Ongoing     Patient to ED with copd exacerbation, patient given 2 duoneb treatments and a 2 hour continuous treatment, will continue to monitor for improvement.

## 2020-07-12 NOTE — ED NOTES
Reassessment of the patients Shortness of Breath   has moderately improved, the patients pain reassessment is a 0/10, Side rails up times 2, call light in reach, will continue to monitor. Resp @28/min with aerosal in progress. Patient awakened when entered room. Pt urinated in bedpan.   States \"I feel better\"     Mau Gomez RN  07/12/20 6744

## 2020-07-12 NOTE — H&P
[address] [place] [address]                              HISTORY AND PHYSICAL    PATIENT NAME: Adeline Carranza                 :        1945  MED REC NO:   [id number]                           ROOM:       0022  ACCOUNT NO:   [de-identified]                           ADMIT DATE: 2020  PROVIDER:     Jimmy Porter. Carmen Finch M.D. PRESENTING COMPLAINT:  Shortness of breath. HISTORY OF PRESENT ILLNESS:  The patient is 67years old who lives with  her handicap son who was in her usual state of health until yesterday  when she developed some shortness of breath. She dates that back to  Friday night/Saturday morning, however. The patient smokes about a half  a dozen of cigarettes a day, which she has been doing for many years,  but this shortness of breath was unaccompanied by any fever or chills. She did have some dry cough with this. She also denies any pleuritic  chest pain with this. She has not noticed any problem with her  breathing a week earlier also, but thought that this may have come on  her relatively suddenly. The patient was brought into the emergency  department after she called 911. The patient was given about two-hour  aerosolized treatment in the ED with some improvement and then admitted  for our service. She states to me that since she came in last night,  she is already feeling somewhat better. She did not have any home O2  prior to this as well. PAST MEDICAL HISTORY:  She does have medical history though remarkable  for hypertension, nephrotic syndrome, hyperlipidemia, anemia. PAST SURGICAL HISTORY:  No pertinent surgical history. MEDICATIONS:  Listed include p.r.n. Ativan, Norvasc, Celexa, Pepcid,  Prilosec, Advair Diskus, Spiriva, cortisol, Lopressor,  methylprednisolone. SOCIAL HISTORY:  She is , just lost her  in 2020 and  has _____ who is handicapped as well.     REVIEW OF SYSTEMS:  Mostly pulmonary with these changes as described;  otherwise, appetite is fair. No constipation or diarrhea. The patient  would deny any orthopnea or PND or any dysuria, urgency or hesitancy or  any hematuria or hematochezia. Denies any pleuritic chest pain or  fever. PHYSICAL EXAMINATION:  GENERAL:  I found an elderly patient in bed this morning with O2 in  place; otherwise, appears stable. VITAL SIGNS:  Most recent vital signs showing blood pressure of 145/62  with a pulse of 105; respirations 26; temperature was 98.2, last taken. NECK:  Supple. Trachea is central.  LUNGS:  Moderate air movement, still experiencing some expiratory  wheezes. CARDIOVASCULAR SYSTEM:  Showed diffuse PMI. S1 and S2 well heard. No  gallop. ABDOMEN:  Soft and there is no tenderness. Positive bowel sounds. No  palpable enlarged organs. NEUROLOGIC:  The patient is alert, awake, responsive to commands  appropriately without any focal deficits. EXTREMITIES:  Shows no edema. SKIN:  Shows some areas of bruises with loose skin. AVAILABLE DIAGNOSTIC DATA:  Include electrolyte with a BUN of 22,  creatinine is 0.7, sodium is 134, potassium 4.2, chloride is 98, CO2 of  23, glucose of 123. LFTs are acceptable. CBC:  White count is 11.9,  hemoglobin is 13.2, hematocrit is 40.3 with a platelet count of 996. Initial blood gas showed a pH 7.3, pCO2 of 45, O2 of 67, saturation of  92%. Chest x-ray shows no acute pneumonic infiltrates, some  interstitial changes to suggest pulmonary fibrosis only. ASSESSMENT AND PLAN:  1. COPD exacerbation. The patient has had some degree of obstructive  airway disease, continues to smoke cigarettes and then went into the  exacerbation. The patient is being treated now with aggressive  treatments including the use of steroid and we will continue this  aggressive mode for now. She is somewhat feeling better and we will try  to ambulate the patient as well.   2.  History of chronic kidney disease, stable condition. We will  monitor. 3.  Hypertension, on medication. We will resume medication as well. 4.  Anticipate short stay in the hospital, we will discharge soon. [address] [place] [address]  Timi Lenz M.D.    D: 07/12/2020 11:05:04       T: 07/12/2020 11:54:43     [email or web address]  Job#: [id number]     Doc#: [id number]    CC:

## 2020-07-12 NOTE — PLAN OF CARE
Problem: Impaired respiratory status  Goal: Clear lung sounds  Description: Clear lung sounds  7/12/2020 1249 by Donte Delaney RCP  Outcome: Ongoing  Note: Pt started on aerosols to clear lung sounds/improve aeration, for maintenance of COPD and pt does MDI's at home.

## 2020-07-12 NOTE — H&P
Dr. Virginia Mccoy ( [facility] )  H&P  7/12/2020  10:48 AM    Patient:  Mike Michelle  YOB: 1945    MRN: [id number]   Acct:  [de-identified]   8B-22/022-A  Primary Care Physician: AUSTIN Lagunas CNP  Over 45 mins spent on this evaluation.   dictated    Electronically signed by Lev Lagunas MD on 7/12/2020 at 10:48 AM         Copy: Primary Care Physician: AUSTIN Lagunas CNP

## 2020-07-12 NOTE — PLAN OF CARE
Problem: Impaired respiratory status  Goal: Clear lung sounds  Description: Clear lung sounds  7/12/2020 1625 by Sondra Lucero  Outcome: Ongoing  7/12/2020 1249 by Gabriel Garcia RCP  Outcome: Ongoing  Note: Pt started on aerosols to clear lung sounds/improve aeration, for maintenance of COPD and pt does MDI's at home.    7/12/2020 1038 by Shefali Smith RN  Outcome: Ongoing  7/12/2020 0538 by Lukas Iverson RCP  Outcome: Ongoing     Problem: Breathing Pattern - Ineffective:  Goal: Ability to achieve and maintain a regular respiratory rate will improve  Description: Ability to achieve and maintain a regular respiratory rate will improve  7/12/2020 1625 by Sondra Lucero  Outcome: Ongoing  7/12/2020 1038 by Shefali Smith RN  Outcome: Ongoing  Note: Dyspnea noted with exertion and at rest.       Problem: Gas Exchange - Impaired:  Goal: Levels of oxygenation will improve  Description: Levels of oxygenation will improve  7/12/2020 1625 by Sondra Lucero  Outcome: Ongoing  7/12/2020 1038 by Shefali Smith RN  Outcome: Ongoing

## 2020-07-12 NOTE — ED PROVIDER NOTES
Reason for Visit: Shortness of Breath      HISTORY OF PRESENT ILLNESS       HPI: This 67 y.o. femalepresents to the emergency department for shortness of breath ongoing for several hours. Patient states when she went to bed she noticed it began with increasing shortness of breath. Patient is not on home oxygen. Patient does continue to smoke. Patient denies chest pain back pain fevers chills headache blurred vision weakness. Patient states she has not been admitted into the hospital.  Patient did receive multiple nebulized treatments as well as Solu-Medrol 125 mg IV prior to arrival by EMS. No other complaints. Past Medical History:   Diagnosis Date    Anemia     Dyslipidemia     HLD (hyperlipidemia)     HTN (hypertension)     Lower extremity edema     Membranous glomerulonephritis     Nephrotic syndrome     Proteinuria        No past surgical history on file.     Radhika Yanes   Social History     Socioeconomic History    Marital status:      Spouse name: Not on file    Number of children: Not on file    Years of education: Not on file    Highest education level: Not on file   Occupational History    Not on file   Social Needs    Financial resource strain: Not on file    Food insecurity     Worry: Not on file     Inability: Not on file    Transportation needs     Medical: Not on file     Non-medical: Not on file   Tobacco Use    Smoking status: Current Every Day Smoker     Packs/day: 0.50     Years: 40.00     Pack years: 20.00     Types: Cigarettes     Last attempt to quit: 2011     Years since quittin.1    Smokeless tobacco: Never Used   Substance and Sexual Activity    Alcohol use: Not on file    Drug use: Never    Sexual activity: Not on file   Lifestyle    Physical activity     Days per week: Not on file     Minutes per session: Not on file    Stress: Not on file   Relationships    Social connections     Talks on phone: Not on file     Gets together: Not on file     Attends Zoroastrianism service: Not on file     Active member of club or organization: Not on file     Attends meetings of clubs or organizations: Not on file     Relationship status: Not on file    Intimate partner violence     Fear of current or ex partner: Not on file     Emotionally abused: Not on file     Physically abused: Not on file     Forced sexual activity: Not on file   Other Topics Concern    Not on file   Social History Narrative    Not on file       Previous Medications    ADVAIR DISKUS 250-50 MCG/DOSE AEPB        AMLODIPINE (NORVASC) 5 MG TABLET    Take 5 mg by mouth daily     CITALOPRAM (CELEXA) 20 MG TABLET    Take 20 mg by mouth daily     FAMOTIDINE (PEPCID) 40 MG TABLET    Take 40 mg by mouth daily     FOLIC ACID (FOLVITE) 1 MG TABLET    Take 1 mg by mouth daily. INCRUSE ELLIPTA 62.5 MCG/INH AEPB        LORAZEPAM (ATIVAN) 2 MG TABLET    Take 2 mg by mouth every 6 hours as needed for Anxiety. LOSARTAN (COZAAR) 100 MG TABLET    Take 1 tablet by mouth daily    METHYLPREDNISOLONE (MEDROL) 4 MG TABLET    Take 2 mg by mouth daily    METOPROLOL (LOPRESSOR) 25 MG TABLET    Take 25 mg by mouth 2 times daily     OMEPRAZOLE (PRILOSEC) 20 MG DELAYED RELEASE CAPSULE    Take 20 mg by mouth Daily     SPIRIVA HANDIHALER 18 MCG INHALATION CAPSULE           Allergies: Allergies as of 07/12/2020    (No Known Allergies)       Review of Systems       See HPI for further details. At least 10 systems reviewed and are otherwise negative unless noted in the history of present illness.      Constitutional: Denies fever or chills   Eyes: Denies change in visual acuity   HENT: Denies nasal congestion or sore throat   Respiratory: Denies cough admits shortness of breath   Cardiovascular: Denies chest pain or edema   GI: Denies abdominal pain, nausea, vomiting, bloody stools or diarrhea   Musculoskeletal: Denies back pain or joint pain   Integument: Denies rash   Neurologic: Denies headache, focal weakness or Grans (Abs) 0.09 (*)     All other components within normal limits   BASIC METABOLIC PANEL - Abnormal; Notable for the following components:    Sodium 134 (*)     Glucose 123 (*)     All other components within normal limits   HEPATIC FUNCTION PANEL - Abnormal; Notable for the following components: Total Bilirubin 0.2 (*)     All other components within normal limits   BLOOD GAS, ARTERIAL - Abnormal; Notable for the following components:    PO2 67 (*)     All other components within normal limits   GLOMERULAR FILTRATION RATE, ESTIMATED - Abnormal; Notable for the following components:    Est, Glom Filt Rate 81 (*)     All other components within normal limits   OSMOLALITY - Abnormal; Notable for the following components:    Osmolality Calc 272.9 (*)     All other components within normal limits   PROTIME-INR   ANION GAP   COVID-19     Medications   albuterol (PROVENTIL) nebulizer solution (15 mg/hr Nebulization New Bag 7/12/20 6033)   ipratropium-albuterol (DUONEB) nebulizer solution 2 ampule (2 ampules Inhalation Given 7/12/20 4266)     New Prescriptions    No medications on file         Counseling     The emergency provider has spoken with the patient and discussed today's findings, in addition to providing specific details for the plan of care. Questions are answered and there is agreement with the plan. This patient was seen under the direct supervision of the attending physician who was available for consultation. Differential Diagnosis   COPD versus COVID versus pneumonia    Consults: Discussed with hospitalist agrees to evaluate for admission. Reevaluation:  Patient with some improvement after multiple albuterol and DuoNeb nebulization treatments. Patient is currently undergoing a 2-hour albuterol nebulized treatment. Patient with increased work of breathing. Will advocate for admission.     DECISION to ADMIT / DISCHARGE:     5:36 AM    Clinical Impression       1.   1. COPD exacerbation ([facility] [address]    2. Hypoxia        Disposition       All results were shared with the patient, medical decision making was discussed and all questions were answered, and she agreed to assessment and plan. Patient was ADMITTED to the hospital based on concerning ED workup. At this point in time, I believe the patient has the mental capacity to make medical decisions. Critical care time. Because of high probability of sudden clinical deterioration of the patient's condition or further deterioration, critical care time including my full attention to the patient's condition including chart data reviewed documentation, medication ordering, reviewing the patient's old records, reevaluation of patient's cardiac, pulmonary and neurological status. Reassessment of vital signs, consultations with ED attending and/or specialists, ordering, interpreting reviewing diagnostic testing. Therefore my critical care time was 45 minutes of direct attention to the patient condition which did not include time spent on procedures.         Darren Adamsma  07/12/20 5313

## 2020-07-12 NOTE — ED NOTES
ED to inpatient nurses report    Chief Complaint   Patient presents with    Shortness of Breath      Present to ED from home  LOC: alert and orientated to name, place, date  Vital signs   Vitals:    07/12/20 0415 07/12/20 0424 07/12/20 0510 07/12/20 0606   BP: (!) 180/91  128/85 98/82   Pulse: 102  91 97   Resp: (!) 44  24 29   Temp: 98.1 °F (36.7 °C)      TempSrc: Oral      SpO2: 95% 92% 96% 96%   Weight: 138 lb (62.6 kg)      Height: 5' 5\" (1.651 m)         Oxygen Baseline  95%    Current needs required none     Bipap/Cpap No  LDAs:    Mobility: Independent  Pending ED orders: none  Present condition: improved - stable    Electronically signed by Fang Eng RN on 7/12/2020 at 6:41 AM     Krishan Vazquez RN  07/12/20 8219

## 2020-07-12 NOTE — PROGRESS NOTES
Pt admitted to  8AB22 via via cart/stretcher from ED. Complaints: Shortness of breath. IV none infusing into the wrist right, condition patent and no redness at a rate of 0 mls/ hour. IV site free of s/s of infection or infiltration. Vital signs obtained. Assessment and data collection initiated. Two nurse skin assessment performed by Othelia Meigs and Saumya YAP. Oriented to room. Policies and procedures for 8AB explained. All questions answered with no further questions at this time. Fall prevention and safety brochure discussed with patient. Bed alarm on. Call light in reach. Oriented to room. Irene Briceño RN 7/12/2020 2:56 PM     Explained patients right to have family, representative or physician notified of their admission. Patient has Declined for physician to be notified. Patient has Declined for family/representative to be notified. Patient would like family notified once per shift?  No

## 2020-07-12 NOTE — ED NOTES
Patient resting quietly with resp @ 28/min with less resp effort with aerosal treatments     Ajay Ramey RN  07/12/20 8895

## 2020-07-12 NOTE — ED NOTES
Bed: 008A  Expected date: 7/12/20  Expected time: 3:55 AM  Means of arrival: Yaa  Comments:  COPD exac     Oleg Anthony RN  07/12/20 1137

## 2020-07-13 ENCOUNTER — APPOINTMENT (OUTPATIENT)
Dept: GENERAL RADIOLOGY | Age: 75
DRG: 190 | End: 2020-07-13
Payer: MEDICARE

## 2020-07-13 PROBLEM — Z72.0 NICOTINE ABUSE: Status: ACTIVE | Noted: 2020-07-13

## 2020-07-13 LAB
BASOPHILS # BLD: 0.2 %
BASOPHILS ABSOLUTE: 0 THOU/MM3 (ref 0–0.1)
EKG ATRIAL RATE: 75 BPM
EKG P AXIS: 66 DEGREES
EKG P-R INTERVAL: 172 MS
EKG Q-T INTERVAL: 390 MS
EKG QRS DURATION: 84 MS
EKG QTC CALCULATION (BAZETT): 435 MS
EKG R AXIS: 45 DEGREES
EKG T AXIS: 64 DEGREES
EKG VENTRICULAR RATE: 75 BPM
EOSINOPHIL # BLD: 0 %
EOSINOPHILS ABSOLUTE: 0 THOU/MM3 (ref 0–0.4)
ERYTHROCYTE [DISTWIDTH] IN BLOOD BY AUTOMATED COUNT: 11.7 % (ref 11.5–14.5)
ERYTHROCYTE [DISTWIDTH] IN BLOOD BY AUTOMATED COUNT: 43.8 FL (ref 35–45)
HCT VFR BLD CALC: 37.2 % (ref 37–47)
HEMOGLOBIN: 12.1 GM/DL (ref 12–16)
IMMATURE GRANS (ABS): 0.14 THOU/MM3 (ref 0–0.07)
IMMATURE GRANULOCYTES: 1.1 %
LYMPHOCYTES # BLD: 5.2 %
LYMPHOCYTES ABSOLUTE: 0.7 THOU/MM3 (ref 1–4.8)
MCH RBC QN AUTO: 33.4 PG (ref 26–33)
MCHC RBC AUTO-ENTMCNC: 32.5 GM/DL (ref 32.2–35.5)
MCV RBC AUTO: 102.8 FL (ref 81–99)
MONOCYTES # BLD: 6.2 %
MONOCYTES ABSOLUTE: 0.8 THOU/MM3 (ref 0.4–1.3)
NUCLEATED RED BLOOD CELLS: 0 /100 WBC
PLATELET # BLD: 287 THOU/MM3 (ref 130–400)
PMV BLD AUTO: 8.8 FL (ref 9.4–12.4)
RBC # BLD: 3.62 MILL/MM3 (ref 4.2–5.4)
SEG NEUTROPHILS: 87.3 %
SEGMENTED NEUTROPHILS ABSOLUTE COUNT: 10.9 THOU/MM3 (ref 1.8–7.7)
WBC # BLD: 12.5 THOU/MM3 (ref 4.8–10.8)

## 2020-07-13 PROCEDURE — 93010 ELECTROCARDIOGRAM REPORT: CPT | Performed by: NUCLEAR MEDICINE

## 2020-07-13 PROCEDURE — 93005 ELECTROCARDIOGRAM TRACING: CPT | Performed by: INTERNAL MEDICINE

## 2020-07-13 PROCEDURE — 94640 AIRWAY INHALATION TREATMENT: CPT

## 2020-07-13 PROCEDURE — 6360000002 HC RX W HCPCS: Performed by: INTERNAL MEDICINE

## 2020-07-13 PROCEDURE — G0378 HOSPITAL OBSERVATION PER HR: HCPCS

## 2020-07-13 PROCEDURE — 97166 OT EVAL MOD COMPLEX 45 MIN: CPT

## 2020-07-13 PROCEDURE — 2580000003 HC RX 258: Performed by: INTERNAL MEDICINE

## 2020-07-13 PROCEDURE — 94760 N-INVAS EAR/PLS OXIMETRY 1: CPT

## 2020-07-13 PROCEDURE — 96372 THER/PROPH/DIAG INJ SC/IM: CPT

## 2020-07-13 PROCEDURE — 36415 COLL VENOUS BLD VENIPUNCTURE: CPT

## 2020-07-13 PROCEDURE — 97163 PT EVAL HIGH COMPLEX 45 MIN: CPT

## 2020-07-13 PROCEDURE — 2700000000 HC OXYGEN THERAPY PER DAY

## 2020-07-13 PROCEDURE — 85025 COMPLETE CBC W/AUTO DIFF WBC: CPT

## 2020-07-13 PROCEDURE — 97116 GAIT TRAINING THERAPY: CPT

## 2020-07-13 PROCEDURE — 96376 TX/PRO/DX INJ SAME DRUG ADON: CPT

## 2020-07-13 PROCEDURE — 99222 1ST HOSP IP/OBS MODERATE 55: CPT | Performed by: INTERNAL MEDICINE

## 2020-07-13 PROCEDURE — 71046 X-RAY EXAM CHEST 2 VIEWS: CPT

## 2020-07-13 PROCEDURE — 97535 SELF CARE MNGMENT TRAINING: CPT

## 2020-07-13 PROCEDURE — 6370000000 HC RX 637 (ALT 250 FOR IP): Performed by: INTERNAL MEDICINE

## 2020-07-13 RX ORDER — METHYLPREDNISOLONE SODIUM SUCCINATE 40 MG/ML
40 INJECTION, POWDER, LYOPHILIZED, FOR SOLUTION INTRAMUSCULAR; INTRAVENOUS EVERY 12 HOURS
Status: COMPLETED | OUTPATIENT
Start: 2020-07-14 | End: 2020-07-15

## 2020-07-13 RX ORDER — NICOTINE 21 MG/24HR
1 PATCH, TRANSDERMAL 24 HOURS TRANSDERMAL EVERY 24 HOURS
Status: DISCONTINUED | OUTPATIENT
Start: 2020-07-13 | End: 2020-07-16 | Stop reason: HOSPADM

## 2020-07-13 RX ORDER — ALPRAZOLAM 0.25 MG/1
0.25 TABLET ORAL EVERY 4 HOURS PRN
Status: DISCONTINUED | OUTPATIENT
Start: 2020-07-13 | End: 2020-07-16 | Stop reason: HOSPADM

## 2020-07-13 RX ADMIN — SODIUM CHLORIDE, PRESERVATIVE FREE 10 ML: 5 INJECTION INTRAVENOUS at 09:20

## 2020-07-13 RX ADMIN — IPRATROPIUM BROMIDE AND ALBUTEROL SULFATE 1 AMPULE: .5; 3 SOLUTION RESPIRATORY (INHALATION) at 08:12

## 2020-07-13 RX ADMIN — SODIUM CHLORIDE, PRESERVATIVE FREE 10 ML: 5 INJECTION INTRAVENOUS at 20:55

## 2020-07-13 RX ADMIN — METHYLPREDNISOLONE SODIUM SUCCINATE 40 MG: 40 INJECTION, POWDER, FOR SOLUTION INTRAMUSCULAR; INTRAVENOUS at 12:04

## 2020-07-13 RX ADMIN — IPRATROPIUM BROMIDE AND ALBUTEROL SULFATE 1 AMPULE: .5; 3 SOLUTION RESPIRATORY (INHALATION) at 16:29

## 2020-07-13 RX ADMIN — CITALOPRAM 20 MG: 20 TABLET, FILM COATED ORAL at 09:19

## 2020-07-13 RX ADMIN — IPRATROPIUM BROMIDE AND ALBUTEROL SULFATE 1 AMPULE: .5; 3 SOLUTION RESPIRATORY (INHALATION) at 12:47

## 2020-07-13 RX ADMIN — LOSARTAN POTASSIUM 100 MG: 100 TABLET, FILM COATED ORAL at 09:19

## 2020-07-13 RX ADMIN — METHYLPREDNISOLONE SODIUM SUCCINATE 40 MG: 40 INJECTION, POWDER, FOR SOLUTION INTRAMUSCULAR; INTRAVENOUS at 05:06

## 2020-07-13 RX ADMIN — IPRATROPIUM BROMIDE AND ALBUTEROL SULFATE 1 AMPULE: .5; 3 SOLUTION RESPIRATORY (INHALATION) at 20:10

## 2020-07-13 RX ADMIN — IPRATROPIUM BROMIDE AND ALBUTEROL SULFATE 1 AMPULE: .5; 3 SOLUTION RESPIRATORY (INHALATION) at 04:48

## 2020-07-13 RX ADMIN — Medication 2 PUFF: at 08:13

## 2020-07-13 RX ADMIN — ALPRAZOLAM 0.25 MG: 0.25 TABLET ORAL at 05:24

## 2020-07-13 RX ADMIN — ENOXAPARIN SODIUM 40 MG: 40 INJECTION SUBCUTANEOUS at 12:03

## 2020-07-13 RX ADMIN — ALPRAZOLAM 0.25 MG: 0.25 TABLET ORAL at 20:54

## 2020-07-13 RX ADMIN — PANTOPRAZOLE SODIUM 40 MG: 40 TABLET, DELAYED RELEASE ORAL at 05:24

## 2020-07-13 RX ADMIN — METOPROLOL TARTRATE 25 MG: 25 TABLET ORAL at 20:54

## 2020-07-13 RX ADMIN — METOPROLOL TARTRATE 25 MG: 25 TABLET ORAL at 09:19

## 2020-07-13 RX ADMIN — FOLIC ACID 1 MG: 1 TABLET ORAL at 09:19

## 2020-07-13 RX ADMIN — METHYLPREDNISOLONE SODIUM SUCCINATE 40 MG: 40 INJECTION, POWDER, FOR SOLUTION INTRAMUSCULAR; INTRAVENOUS at 18:10

## 2020-07-13 RX ADMIN — AMLODIPINE BESYLATE 5 MG: 5 TABLET ORAL at 09:19

## 2020-07-13 ASSESSMENT — PAIN SCALES - GENERAL
PAINLEVEL_OUTOF10: 0
PAINLEVEL_OUTOF10: 0

## 2020-07-13 NOTE — FLOWSHEET NOTE
07/13/20 0516   Provider Notification   Reason for Communication New orders   Provider Name Dr. Enid Barba   Provider Notification Physician   Method of Communication Secure Message   Response Waiting for response   Notification Time [id number]   Dr. Enid Barba notified of pt being very anxious. New orders given for Xanax 0.25 mg every 6 hours, as needed.

## 2020-07-13 NOTE — CONSULTS
[facility]      Patient - Boston Farrell   MRN -  [id number]   Acct # - [de-identified]   - 1945      Date of Admission -  2020  4:01 AM  Date of evaluation -  2020  Room - [address] [place] [address] [place], [place] Primary Care Physician - AUSTIN Cid CNP     Problem List      Active Hospital Problems    Diagnosis Date Noted    Nicotine abuse [Z72.0] 2020    COPD exacerbation ([address] [J44.1] 2020    HTN (hypertension) [I10]      Reason for Consult    For management of COPD and exacerbation. HPI   History Obtained From: Patient and electronic medical record. Boston Farrell is a 67 y.o. female  was initially admitted under Dr. Aicha Huston service. Pulmonary medicine was consulted for further management of COPD/exacerbation. The patient is a 67 y.o. female with past medical hx of tobacco smoking for 50years with 2PPD, still smoking 4 to 5 cigarettes per day. She was diagnosed with COPD in the past. She used to follow with Dr. Lizabeth Blandon in the past. She used to be Advair discuss 250/50, Incruse Ellipta along with Spiriva Handihaler at home. She never used to be on home O2 in the past. She presented with worsening of shortness of breath for the last 3 days. Her current illness started as upper respiratory tract infection with cough, cold and sputum  production which is white in color. She noticed worsening of shortness of breath with decline in functional status. For the last 2 days she not able to attend her regular activities at home. She start using her rescue inhalers/nebulizations more frequently at home with no significant improvement in her short ness of breath or cough. Due to worsening of her above symptoms she presented to the Emergency room at [facility] by calling 911.  She was evaluated in the Emergency room by ER physician and admitted under MD Blanca service for further evaluation. She had a Hx of fall on her right side of chest in December 24th, 2017. She subsequently developed fracture of right side ribs leading to ? Hemothorax. She signed her self from [facility] in 2008. She was subsequently admitted to [facility]. She underwent chest tube placement with subsequent removal. ? Fibrothorax. The patient is a 67 y.o. female who presented with worsening of shortness of breath; She is having shortness of breath: Yes  Onset: gradual   Duration: 3days. Diurnal variation: None. Current functional capacity on level ground: Very limited on level ground. She denies orthopnea. She denies paroxysmal nocturnal dyspnea. Her shortness of breath is associated with cough and wheezing. She is having cough: Yes  Duration of cough: for 3 days. Her cough is associated with sputum production: Yes   The sputum color: white  Hemoptysis:No  Diurnal variation: None. She gives a history of fever: No  Chills & rigors:No  Associated night sweats: No.  Recent travel history:No.    Rrecent sick contacts: No.        PMHx   Past Medical History      Diagnosis Date    Anemia     Dyslipidemia     HLD (hyperlipidemia)     HTN (hypertension)     Lower extremity edema     Membranous glomerulonephritis     Nephrotic syndrome     Proteinuria       Past Surgical History    History reviewed. No pertinent surgical history.   Meds    Current Medications    nicotine  1 patch Transdermal Q24H    sodium chloride flush  10 mL Intravenous 2 times per day    enoxaparin  40 mg Subcutaneous Q24H    ipratropium-albuterol  1 ampule Inhalation Q4H WA    methylPREDNISolone  40 mg Intravenous Q6H    Followed by   Yoan Mccoy ON 7/14/2020] predniSONE  40 mg Oral Daily    amLODIPine  5 mg Oral Daily    citalopram  20 mg Oral Daily    folic acid  1 mg Oral Daily    losartan  100 mg Oral Daily    metoprolol tartrate  25 mg Oral BID    budesonide-formoterol  2 puff Inhalation BID    pantoprazole  40 mg Oral QAM AC     ALPRAZolam, sodium chloride flush, acetaminophen **OR** acetaminophen, polyethylene glycol, promethazine **OR** ondansetron  IV Drips/Infusions    Home Medications  Medications Prior to Admission: LORazepam (ATIVAN) 2 MG tablet, Take 2 mg by mouth every 6 hours as needed for Anxiety. amLODIPine (NORVASC) 5 MG tablet, Take 5 mg by mouth daily   citalopram (CELEXA) 20 MG tablet, Take 20 mg by mouth daily   famotidine (PEPCID) 40 MG tablet, Take 40 mg by mouth daily   omeprazole (PRILOSEC) 20 MG delayed release capsule, Take 20 mg by mouth Daily   INCRUSE ELLIPTA 62.5 MCG/INH AEPB,   ADVAIR DISKUS 250-50 MCG/DOSE AEPB,   SPIRIVA HANDIHALER 18 MCG inhalation capsule,   losartan (COZAAR) 100 MG tablet, Take 1 tablet by mouth daily  metoprolol (LOPRESSOR) 25 MG tablet, Take 25 mg by mouth 2 times daily   folic acid (FOLVITE) 1 MG tablet, Take 1 mg by mouth daily. methylPREDNISolone (MEDROL) 4 MG tablet, Take 2 mg by mouth daily  Diet    DIET GENERAL;  Allergies    Patient has no known allergies. Family History    History reviewed. No pertinent family history.   Sleep History    Never diagnosed with sleep apnea in the past    Social History     Social History     Socioeconomic History    Marital status:      Spouse name: Not on file    Number of children: Not on file    Years of education: Not on file    Highest education level: Not on file   Occupational History    Not on file   Social Needs    Financial resource strain: Not on file    Food insecurity     Worry: Not on file     Inability: Not on file    Transportation needs     Medical: Not on file     Non-medical: Not on file   Tobacco Use    Smoking status: Current Every Day Smoker     Packs/day: 0.50     Years: 40.00     Pack years: 20.00     Types: Cigarettes     Last attempt to quit: 2011     Years since quittin.1    Smokeless tobacco: Never Used   Substance and Sexual Activity    Alcohol use: Yes     Alcohol/week: 0.0 standard drinks     Comment: 2 beers/day    Drug use: Never    Sexual activity: Not on file   Lifestyle    Physical activity     Days per week: Not on file     Minutes per session: Not on file    Stress: Not on file   Relationships    Social connections     Talks on phone: Not on file     Gets together: Not on file     Attends Spiritism service: Not on file     Active member of club or organization: Not on file     Attends meetings of clubs or organizations: Not on file     Relationship status: Not on file    Intimate partner violence     Fear of current or ex partner: Not on file     Emotionally abused: Not on file     Physically abused: Not on file     Forced sexual activity: Not on file   Other Topics Concern    Not on file   Social History Narrative    Not on file       Riview of systems   General/Constitutional: No recent loss of weight with poor appetite. No fever or chills. HENT: Negative. Eyes: Negative. Upper respiratory tract: No nasal stuffiness with no post nasal drip. Lower respiratory tract/ lungs: See HPI for details. No hemoptysis. Cardiovascular: No palpitations or chest pain. Gastrointestinal: No nausea or vomiting. Neurological: No focal neurologiacal weakness. Extremities: No edema. Musculoskeletal: No complaints. Genitourinary: No complaints. Hematological: Negative. Psychiatric/Behavioral: Negative. Skin: No itching. Vitals     height is 5' 5\" (1.651 m) and weight is 140 lb 3.2 oz (63.6 kg). Her oral temperature is 98.3 °F (36.8 °C). Her blood pressure is 140/66 (abnormal) and her pulse is 93. Her respiration is 16 and oxygen saturation is 93%. Body mass index is 23.33 kg/m². SUPPLEMENTAL O2: O2 Flow Rate (L/min): 1 L/min     I/O        Intake/Output Summary (Last 24 hours) at 7/13/2020 1708  Last data filed at 7/13/2020 0920  Gross per 24 hour   Intake 450 ml   Output --   Net 450 ml     I/O last 3 completed shifts:   In: input(s): LACTA in the last 72 hours. INR  Recent Labs     07/12/20  0430   INR 0.89     PTT  No results for input(s): APTT in the last 72 hours. Glucose  No results for input(s): POCGLU in the last 72 hours. UA No results for input(s): SPECGRAV, PHUR, COLORU, CLARITYU, MUCUS, PROTEINU, BLOODU, RBCUA, WBCUA, BACTERIA, NITRU, GLUCOSEU, BILIRUBINUR, UROBILINOGEN, KETUA, LABCAST, LABCASTTY, AMORPHOS in the last 72 hours. Invalid input(s): CRYSTALS. PFTs   None in [facility]    Sleep studies   None in Epic    Cultures    None in Epic  -COVID-19 test ( SARS-CoV-2) is Negative/None detected on: 7/12/2020      EKG     Echocardiogram   None in Epic    Radiology    CXR  Jul 13, 2020   PROCEDURE: XR CHEST (2 VW)   No acute cardiopulmonary disease. Stable appearance of the chest compared to the prior study as detailed above. CT Scans  (See actual reports for details)  CT chest done in 1/4/2008:  No official report is available in [place]. Assessment   -Acute exacerbation of COPD due to acute bronchitis with viral Vs bacterial etiolgy.  -Chronic Hx of tobacco abuse- she is still smoking 4 to 5 cigarettes per day. -Hx of fall on her right side of chest in December 24th, 2017. She subsequently developed fracture of right side ribs leading to ? Hemothorax. She signed her self from [facility] in [facility]. She was subsequently admitted to [facility]. She underwent chest tube placement with subsequent removal. ? Fibrothorax. -HTN (hypertension). -Nephrotic syndrome      Plan   -Will continue Solumedrol to 40mg IVBID  -Sputum cultures. -Duonebs 3ml via nebs Q4h while awake. -Will hold off on Symbicort.  -Bed side spirometry. -CT chest with IV contrast to further evaluate right side lung opacity.  -Will resume Advair,Incruse and albuterol at the time of discharge.  -Home O2 eval prior to discharge. - Patient educated to quit smoking as soon as possible.  Patient verbalizes understanding of adverse consequences of tobacco smoking.  -Titrate Oxygen to keep Spo2 >90%. -Deep Venous Thrombosis Prophylaxis: lovenox. \"Thank you for asking us to see this patient\"   . - Shaji Ambriz about my impression and plan. She verbalizes understanding. Questions and concerns addressed.     Electronically signed by   Claribel May MD on 7/13/2020 at 5:08 PM

## 2020-07-13 NOTE — CARE COORDINATION
DISCHARGE/PLANNING EVALUATION  7/13/20, 2:53 PM EDT    Reason for Referral: \"requestes [facility] for nursing\"  Mental Status: Patient is alert and oriented  Decision Making: Patient makes own decisions  Family/Social/Home Environment: Spoke with patient, assessment completed. Patient's handicapped son lives with her in a 2 story with full basement. Her bedroom and bathroom are on the main floor. She has a walk-in/roll-in shower that is handicapped accessible with bench and grab bars. Her daughter has been picking up groceries since COVID began. She states she is independent with ADL's, household chores, cooking and driving. She has a lawn services mow. Current Services including food security, transportation and housekeeping: Not current with [facility] services, needs met. Current Equipment:none  Payment Source:Medicare and BCBS  Concerns or Barriers to Discharge: Patient would like [facility], nursing. Post acute provider list with quality measures, geographic area and applicable managed care information provided. Questions regarding selection process answered: declined list, wants [facility]. Teach Back Method used with patient regarding care plan and discharge planning. Patient  verbalize understanding of the plan of care and contribute to goal setting. Patient goals, treatment preferences and discharge plan: Patient plans home with son and new [facility], nursing.     Electronically signed by WING Lyman on 7/13/2020 at 2:53 PM

## 2020-07-13 NOTE — PROGRESS NOTES
mobility    Equipment Recommendations:  Equipment Needed: No    Plan:  Times per week: 3-5x  Current Treatment Recommendations: Strengthening, Endurance Training, Patient/Caregiver Education & Training, Self-Care / ADL, Balance Training, Functional Mobility Training, Safety Education & Training. See long-term goal time frame for expected duration of plan of care. If no long-term goals established, a short length of stay is anticipated. Goals:  Patient goals : go home  Short term goals  Time Frame for Short term goals: by discharge  Short term goal 1: Pt to complete BADL routine with SBA and no vcs for safety  Short term goal 2: Pt to be educated on energy conservation tech and demo good follow through of 1-2 tech with no vcs `         Following session, patient left in safe position with all fall risk precautions in place.

## 2020-07-13 NOTE — PLAN OF CARE
Problem: Impaired respiratory status  Goal: Clear lung sounds  Description: Clear lung sounds  7/13/2020 0932 by Riccardo Wilde RN  Outcome: Ongoing  Note: Respirations easy and unlabored. SOB with exertion. Expiratory wheezes. Problem: Falls - Risk of:  Goal: Will remain free from falls  Description: Will remain free from falls  7/13/2020 0932 by Riccardo Wilde RN  Outcome: Ongoing  Note: Patient free from falls. Bed alarm, chair alarm, call light within reach, non skid footwear on when up. Problem: Falls - Risk of:  Goal: Absence of physical injury  Description: Absence of physical injury  7/13/2020 0932 by Riccardo Wilde RN  Outcome: Ongoing  Note: Patient free from falls. Bed alarm, chair alarm, call light within reach, non skid footwear on when up. Problem: Discharge Planning:  Goal: Discharged to appropriate level of care  Description: Discharged to appropriate level of care  7/13/2020 0932 by Riccardo Wilde RN  Outcome: Ongoing  Note: Plans to return home with spouse at discharge. Problem: Activity Intolerance:  Goal: Ability to tolerate increased activity will improve  Description: Ability to tolerate increased activity will improve  7/13/2020 0932 by Riccardo Wilde RN  Outcome: Ongoing  Note: Continues to work with therapy. Still getting SOB with exertion. Problem: Breathing Pattern - Ineffective:  Goal: Ability to achieve and maintain a regular respiratory rate will improve  Description: Ability to achieve and maintain a regular respiratory rate will improve  7/13/2020 0932 by Riccardo Wilde RN  Outcome: Ongoing  Note: Respirations easy and unlabored. SOB with exertion. Expiratory wheezes.      Problem: Gas Exchange - Impaired:  Goal: Levels of oxygenation will improve  Description: Levels of oxygenation will improve  7/13/2020 0932 by Riccardo Wilde RN  Outcome: Ongoing  Note: Continues on 1L of oxygen per NC, oxygen sats 92-93%     Care plan reviewed with patient. Patient verbalize understanding of the plan of care and contribute to goal setting.

## 2020-07-13 NOTE — PLAN OF CARE
Problem: Impaired respiratory status  Goal: Clear lung sounds  Description: Clear lung sounds  7/13/2020 0042 by Tova Anaya RN  Outcome: Ongoing  Note: Pt has expiratory wheezes throughout. Breathing treatments given per order. Problem: Falls - Risk of:  Goal: Will remain free from falls  Description: Will remain free from falls  Outcome: Ongoing  Note: Absence of falls this shift. Fall prevention in place. Fall band applied. Bed alarm activated as needed. Problem: Falls - Risk of:  Goal: Absence of physical injury  Description: Absence of physical injury  Outcome: Ongoing  Note: Absence of physical injury. Problem: Discharge Planning:  Goal: Discharged to appropriate level of care  Description: Discharged to appropriate level of care  Outcome: Ongoing  Note: Pt plans to be discharged home with son when appropriate. Problem: Activity Intolerance:  Goal: Ability to tolerate increased activity will improve  Description: Ability to tolerate increased activity will improve  Outcome: Ongoing  Note: Pt very short of breath with exertion. PT/OT to evaluate in morning. Problem: Breathing Pattern - Ineffective:  Goal: Ability to achieve and maintain a regular respiratory rate will improve  Description: Ability to achieve and maintain a regular respiratory rate will improve  7/13/2020 0042 by Tova Anaya RN  Outcome: Ongoing  Note: Pt very short of breath with exertion. IV steroids, and breathing treatments given per order. Problem: Gas Exchange - Impaired:  Goal: Levels of oxygenation will improve  Description: Levels of oxygenation will improve  7/13/2020 0042 by Tova Anaya RN  Outcome: Ongoing  Note: Pt oxygen saturation remains above 90% on 2 Liters of Oxygen. Care plan reviewed with patient. Patient verbalize understanding of the plan of care and contribute to goal setting.

## 2020-07-13 NOTE — PROGRESS NOTES
Dr. Nusrat Arana note       Internal Medicine              Patient:  Dann Garcia  YOB: 1945    MRN: [id number]    Acct:  [de-identified]   [id number]  Primary Care Physician: AUSTIN Burnette CNP    Admit Date: 7/12/2020           Subjective: had an episode of anxiety. Better with the med but will consider nicotine replacement. Objective:      Physical Exam:    Vitals:  Patient Vitals for the past 24 hrs:   BP Temp Temp src Pulse Resp SpO2 Weight   07/13/20 0915 (!) 144/76 98.3 °F (36.8 °C) Oral 91 20 92 % --   07/13/20 0448 -- -- -- -- -- 91 % --   07/13/20 0403 -- 98.3 °F (36.8 °C) Oral 97 24 93 % 140 lb 3.2 oz (63.6 kg)   07/12/20 2343 (!) 149/67 98.3 °F (36.8 °C) Oral 75 22 96 % --   07/12/20 2026 -- -- -- -- 18 93 % --   07/12/20 2004 (!) 143/64 98.4 °F (36.9 °C) Oral 89 18 96 % --   07/12/20 1619 -- -- -- -- 18 95 % --   07/12/20 1533 (!) 176/80 98.3 °F (36.8 °C) Oral 91 16 95 % --     Weight: Weight: 140 lb 3.2 oz (63.6 kg)     24 hour intake/output:    Intake/Output Summary (Last 24 hours) at 7/13/2020 1217  Last data filed at 7/13/2020 0920  Gross per 24 hour   Intake 630 ml   Output --   Net 630 ml       General appearance - alert,oriented to person, place  NECK:  Supple. Trachea is central.  LUNGS:  Moderate air movement, still experiencing some expiratory  wheezes. CARDIOVASCULAR SYSTEM:  Showed diffuse PMI. S1 and S2 well heard. No  gallop. ABDOMEN:  Soft and there is no tenderness. Positive bowel sounds. No  palpable enlarged organs. NEUROLOGIC:  The patient is alert, awake, responsive to commands  appropriately without any focal deficits. EXTREMITIES:  Shows no edema. SKIN:  Shows some areas of bruises with loose skin.       Review of Labs and Diagnostic Testing:    CBC:   Recent Labs     07/13/20  0359   WBC 12.5*   HGB 12.1   HCT 37.2   .8*        BMP:   Recent Labs     07/12/20  8840 *   K 4.2   CL 98   CO2 23   BUN 22   CREATININE 0.7   CALCIUM 8.6   GLUCOSE 123*     PT/INR:   Recent Labs     07/12/20 0430   INR 0.89     APTT: No results for input(s): APTT in the last 72 hours. Lipids:   Recent Labs     07/12/20 0430   ALKPHOS 82   ALT 26   AST 35   BILITOT 0.2*   BILIDIR <0.2   LABALBU 4.1     Troponin: No results for input(s): TROPONINI in the last 72 hours. Imaging:  Xr Chest Standard (2 Vw)    Result Date: 7/13/2020  PROCEDURE: XR CHEST (2 VW) CLINICAL INFORMATION: sob. COMPARISON: Chest x-ray dated 7/12/2020 TECHNIQUE: Frontal and lateral views of the chest were obtained. FINDINGS: Lungs/pleura: Lungs are hyperinflated consistent with COPD. No pneumonia, pulmonary edema, or obvious mass. Interstitium remains prominent consistent with pulmonary fibrosis. No pleural effusion. No pneumothorax. Heart: There is stable mild cardiomegaly. Mediastinum/geoffrey: No obvious mass or adenopathy. Skeleton: Again noted are multiple old healed right rib fractures. There is an old mild to moderate T12 compression fracture deformity. No acute cardiopulmonary disease. Stable appearance of the chest compared to the prior study as detailed above. **This report has been created using voice recognition software. It may contain minor errors which are inherent in voice recognition technology. ** Final report electronically signed by Dr. Juancarlos Dunham on 7/13/2020 6:36 AM    Xr Chest Portable    Result Date: 7/12/2020  PROCEDURE: XR CHEST PORTABLE CLINICAL INFORMATION: SOB. COMPARISON: Chest x-ray dated 1/3/2008 TECHNIQUE: AP Portable chest xray FINDINGS: Lines/tubes/devices: none Lungs/pleura: Lungs are hyperinflated consistent with COPD. There are bilateral interstitial infiltrates which probably represent interstitial pulmonary fibrosis however cannot exclude mild interstitial pulmonary edema or an atypical viral-type pneumonia. Correlate clinically. There is no focal pneumonia.  No pleural effusion. No pneumothorax. Heart: There is mild cardiomegaly. Mediastinum/geoffrey: No obvious mass or adenopathy. Skeleton: Again noted are multiple old right rib fracture deformities. No acute pneumonia. Interstitial infiltrates, likely pulmonary fibrosis. See discussion above and correlate clinically. COPD. Cardiomegaly. **This report has been created using voice recognition software. It may contain minor errors which are inherent in voice recognition technology. ** Final report electronically signed by Dr. Lizzy Cummins on 7/12/2020 5:03 AM      EKG:      Diet: DIET GENERAL;        Data:   Scheduled Meds: Scheduled Meds:   sodium chloride flush  10 mL Intravenous 2 times per day    enoxaparin  40 mg Subcutaneous Q24H    ipratropium-albuterol  1 ampule Inhalation Q4H WA    methylPREDNISolone  40 mg Intravenous Q6H    Followed by   Katja Arciniega ON 7/14/2020] predniSONE  40 mg Oral Daily    amLODIPine  5 mg Oral Daily    citalopram  20 mg Oral Daily    folic acid  1 mg Oral Daily    losartan  100 mg Oral Daily    metoprolol tartrate  25 mg Oral BID    budesonide-formoterol  2 puff Inhalation BID    pantoprazole  40 mg Oral QAM AC     Continuous Infusions:  PRN Meds:. ALPRAZolam, sodium chloride flush, acetaminophen **OR** acetaminophen, polyethylene glycol, promethazine **OR** ondansetron  Continuous Infusions:      Assessment   Active Problems:    HTN (hypertension)    COPD exacerbation (HCC)    Nicotine abuse  Resolved Problems:    * No resolved hospital problems.  *        Patient Active Problem List   Diagnosis    Nephrotic syndrome    HTN (hypertension)    Lower extremity edema    Membranous glomerulonephritis    Proteinuria    Anemia    Edema of both legs    Dyslipidemia    Hyponatremia    COPD exacerbation (HCC)    Nicotine abuse        Plan   Will cont with the treatment  Start her on nicotine patch  Pt/ot      Electronically signed by Barbie Valle MD on 7/13/2020 at 12:17 PM  Over 35 mins spent on this evaluation

## 2020-07-13 NOTE — PROGRESS NOTES
throughout the day, and short distance amb with staff several times a day    Functional Outcome Measures: Completed  AM-PAC Inpatient Mobility Raw Score : 20  AM-PAC Inpatient T-Scale Score : 47.67    ASSESSMENT:  Activity Tolerance:  Patient tolerance of  treatment: fair. Easily SOB      Treatment Initiated: Treatment and education initiated within context of evaluation. Evaluation time included review of current medical information, gathering information related to past medical, social and functional history, completion of standardized testing, formal and informal observation of tasks, assessment of data and development of plan of care and goals. Treatment time included skilled education and facilitation of tasks to increase safety and independence with functional mobility for improved independence and quality of life. 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, generalized weakness, dec balance and endurance, on O2, inc assist for safe mobility, recommend cont PT to inc pt I with functional mobility  Prognosis: Excellent    REQUIRES PT FOLLOW UP: Yes    Discharge Recommendations:  Discharge Recommendations: Continue to assess pending progress    Patient Education:  PT Education: PT Role, Plan of Care, Functional Mobility Training    Equipment Recommendations:   Other: cont to assess pt needs    Plan:  Times per week: 5X GM  Times per day: Daily  Specific instructions for Next Treatment: therex and mobility    Goals:  Patient goals : return home, breath better  Short term goals  Time Frame for Short term goals: by discharge  Short term goal 1: bed mobility with MOD I to get in/out of bed  Short term goal 2: transfer with MOD I to get in/out of chairs  Short term goal 3: amb >75'x1 without AD and MOD I to walk safely in home  Short term goal 4: negotiate 1 step without HR and MOD I to enter home safely  Long term goals  Time Frame for Long term goals : no LTGs set secondary to short ELOS    Following session, patient left in safe position with all fall risk precautions in place.

## 2020-07-14 ENCOUNTER — APPOINTMENT (OUTPATIENT)
Dept: CT IMAGING | Age: 75
DRG: 190 | End: 2020-07-14
Payer: MEDICARE

## 2020-07-14 PROBLEM — J44.9 COPD (CHRONIC OBSTRUCTIVE PULMONARY DISEASE) (HCC): Status: ACTIVE | Noted: 2020-07-14

## 2020-07-14 PROCEDURE — 6360000002 HC RX W HCPCS: Performed by: INTERNAL MEDICINE

## 2020-07-14 PROCEDURE — 1200000003 HC TELEMETRY R&B

## 2020-07-14 PROCEDURE — 2580000003 HC RX 258: Performed by: INTERNAL MEDICINE

## 2020-07-14 PROCEDURE — 96376 TX/PRO/DX INJ SAME DRUG ADON: CPT

## 2020-07-14 PROCEDURE — 94761 N-INVAS EAR/PLS OXIMETRY MLT: CPT

## 2020-07-14 PROCEDURE — 6360000004 HC RX CONTRAST MEDICATION: Performed by: INTERNAL MEDICINE

## 2020-07-14 PROCEDURE — 94010 BREATHING CAPACITY TEST: CPT

## 2020-07-14 PROCEDURE — 99233 SBSQ HOSP IP/OBS HIGH 50: CPT | Performed by: INTERNAL MEDICINE

## 2020-07-14 PROCEDURE — 97530 THERAPEUTIC ACTIVITIES: CPT

## 2020-07-14 PROCEDURE — 97116 GAIT TRAINING THERAPY: CPT

## 2020-07-14 PROCEDURE — 71260 CT THORAX DX C+: CPT

## 2020-07-14 PROCEDURE — 6370000000 HC RX 637 (ALT 250 FOR IP): Performed by: NURSE PRACTITIONER

## 2020-07-14 PROCEDURE — 6370000000 HC RX 637 (ALT 250 FOR IP): Performed by: INTERNAL MEDICINE

## 2020-07-14 PROCEDURE — 97110 THERAPEUTIC EXERCISES: CPT

## 2020-07-14 PROCEDURE — 94640 AIRWAY INHALATION TREATMENT: CPT

## 2020-07-14 PROCEDURE — APPSS30 APP SPLIT SHARED TIME 16-30 MINUTES: Performed by: NURSE PRACTITIONER

## 2020-07-14 RX ORDER — GUAIFENESIN 600 MG/1
600 TABLET, EXTENDED RELEASE ORAL 2 TIMES DAILY
Status: DISCONTINUED | OUTPATIENT
Start: 2020-07-14 | End: 2020-07-16 | Stop reason: HOSPADM

## 2020-07-14 RX ORDER — AZITHROMYCIN 250 MG/1
500 TABLET, FILM COATED ORAL DAILY
Status: DISCONTINUED | OUTPATIENT
Start: 2020-07-14 | End: 2020-07-16 | Stop reason: HOSPADM

## 2020-07-14 RX ADMIN — IOPAMIDOL 65 ML: 755 INJECTION, SOLUTION INTRAVENOUS at 08:02

## 2020-07-14 RX ADMIN — METHYLPREDNISOLONE SODIUM SUCCINATE 40 MG: 40 INJECTION, POWDER, FOR SOLUTION INTRAMUSCULAR; INTRAVENOUS at 05:29

## 2020-07-14 RX ADMIN — METOPROLOL TARTRATE 25 MG: 25 TABLET ORAL at 08:44

## 2020-07-14 RX ADMIN — IPRATROPIUM BROMIDE AND ALBUTEROL SULFATE 1 AMPULE: .5; 3 SOLUTION RESPIRATORY (INHALATION) at 07:37

## 2020-07-14 RX ADMIN — IPRATROPIUM BROMIDE AND ALBUTEROL SULFATE 1 AMPULE: .5; 3 SOLUTION RESPIRATORY (INHALATION) at 22:31

## 2020-07-14 RX ADMIN — METHYLPREDNISOLONE SODIUM SUCCINATE 40 MG: 40 INJECTION, POWDER, FOR SOLUTION INTRAMUSCULAR; INTRAVENOUS at 17:29

## 2020-07-14 RX ADMIN — AMLODIPINE BESYLATE 5 MG: 5 TABLET ORAL at 08:44

## 2020-07-14 RX ADMIN — GUAIFENESIN 600 MG: 600 TABLET, EXTENDED RELEASE ORAL at 19:58

## 2020-07-14 RX ADMIN — AZITHROMYCIN DIHYDRATE 500 MG: 250 TABLET, FILM COATED ORAL at 17:28

## 2020-07-14 RX ADMIN — IPRATROPIUM BROMIDE AND ALBUTEROL SULFATE 1 AMPULE: .5; 3 SOLUTION RESPIRATORY (INHALATION) at 11:14

## 2020-07-14 RX ADMIN — CEFTRIAXONE SODIUM 1 G: 1 INJECTION, POWDER, FOR SOLUTION INTRAMUSCULAR; INTRAVENOUS at 19:58

## 2020-07-14 RX ADMIN — FOLIC ACID 1 MG: 1 TABLET ORAL at 08:44

## 2020-07-14 RX ADMIN — ENOXAPARIN SODIUM 40 MG: 40 INJECTION SUBCUTANEOUS at 16:02

## 2020-07-14 RX ADMIN — IPRATROPIUM BROMIDE AND ALBUTEROL SULFATE 1 AMPULE: .5; 3 SOLUTION RESPIRATORY (INHALATION) at 15:03

## 2020-07-14 RX ADMIN — LOSARTAN POTASSIUM 100 MG: 100 TABLET, FILM COATED ORAL at 08:44

## 2020-07-14 RX ADMIN — PANTOPRAZOLE SODIUM 40 MG: 40 TABLET, DELAYED RELEASE ORAL at 05:29

## 2020-07-14 RX ADMIN — ALPRAZOLAM 0.25 MG: 0.25 TABLET ORAL at 02:47

## 2020-07-14 RX ADMIN — SODIUM CHLORIDE, PRESERVATIVE FREE 10 ML: 5 INJECTION INTRAVENOUS at 08:44

## 2020-07-14 RX ADMIN — METOPROLOL TARTRATE 25 MG: 25 TABLET ORAL at 19:59

## 2020-07-14 RX ADMIN — CITALOPRAM 20 MG: 20 TABLET, FILM COATED ORAL at 08:44

## 2020-07-14 RX ADMIN — IPRATROPIUM BROMIDE AND ALBUTEROL SULFATE 1 AMPULE: .5; 3 SOLUTION RESPIRATORY (INHALATION) at 02:38

## 2020-07-14 ASSESSMENT — PAIN SCALES - GENERAL
PAINLEVEL_OUTOF10: 0
PAINLEVEL_OUTOF10: 0

## 2020-07-14 NOTE — PROGRESS NOTES
[facility]      Patient - Jessica Acevedo   MRN -  [id number]   Acct # - [de-identified]   - 1945      Date of Admission -  2020  4:01 AM  Date of evaluation -  2020  Room - [address] [place] [address] [place], MD Primary Care Physician - Debo Hernadez , APRN - [id number]     Problem List      Active Hospital Problems    Diagnosis Date Noted    Nicotine abuse [Z72.0] 2020    COPD exacerbation ([address] [J44.1] 2020    HTN (hypertension) [I10]      Reason for Consult    For management of COPD exacerbation  HPI   History Obtained From: Patient and electronic medical record. Jessica Acevedo is a 67 y.o. female  was initially admitted under Dr. Homer Escoto service. Pulmonary medicine was consulted for further management of COPD/exacerbation. The patient is a 67 y.o. female with past medical hx of tobacco smoking for 56 years with 2PPD, still smoking 4 to 5 cigarettes per day. She was diagnosed with COPD in the past. She used to follow with Dr. Parker Hernandez in the past, none currently. She used to be on Advair diskus 250/50, Incruse Ellipta along with Spiriva Handihaler at home. She never used to be on home O2 in the past. She presented with worsening of shortness of breath for the last 3 days. Her current illness started as upper respiratory tract infection with cough, cold and sputum  production which is white in color. She noticed worsening of shortness of breath with decline in functional status. For the last 2 days she not able to attend her regular activities at home. She start using her rescue inhalers/nebulizations more frequently at home with no significant improvement in her shortness of breath or cough. Due to worsening of her above symptoms she presented to the Emergency room at [facility] by calling 911.  She was evaluated in the Emergency room by ER physician and admitted under MD Blanca service for further evaluation. She had a Hx of fall on her right side of chest Dec 24 2007. She subsequently developed fracture of right side ribs leading to ? Hemothorax. She signed her self from [facility] in 2008. She was subsequently admitted to [facility]. She underwent chest tube placement with subsequent removal. ? Fibrothorax. Past 24 hrs   -On 1 LPM via NC  -Reports ongoing SOB and wheezing  -Cough is intermittently productive white/light yellow phlegm  -Denies Chest pain or hemoptysis  -Bedside spirometry completed very severe obstruction  All other systems reviewed  PMHx   Past Medical History      Diagnosis Date    Anemia     Dyslipidemia     HLD (hyperlipidemia)     HTN (hypertension)     Lower extremity edema     Membranous glomerulonephritis     Nephrotic syndrome     Proteinuria       Past Surgical History    History reviewed. No pertinent surgical history. Meds    Current Medications    nicotine  1 patch Transdermal Q24H    methylPREDNISolone  40 mg Intravenous Q12H    sodium chloride flush  10 mL Intravenous 2 times per day    enoxaparin  40 mg Subcutaneous Q24H    ipratropium-albuterol  1 ampule Inhalation Q4H WA    amLODIPine  5 mg Oral Daily    citalopram  20 mg Oral Daily    folic acid  1 mg Oral Daily    losartan  100 mg Oral Daily    metoprolol tartrate  25 mg Oral BID    pantoprazole  40 mg Oral QAM AC     ALPRAZolam, sodium chloride flush, acetaminophen **OR** acetaminophen, polyethylene glycol, promethazine **OR** ondansetron  IV Drips/Infusions    Home Medications  Medications Prior to Admission: LORazepam (ATIVAN) 2 MG tablet, Take 2 mg by mouth every 6 hours as needed for Anxiety.   amLODIPine (NORVASC) 5 MG tablet, Take 5 mg by mouth daily   citalopram (CELEXA) 20 MG tablet, Take 20 mg by mouth daily   famotidine (PEPCID) 40 MG tablet, Take 40 mg by mouth daily   omeprazole (PRILOSEC) 20 MG delayed release capsule, Take 20 mg by mouth Daily   INCRUSE ELLIPTA 62.5 MCG/INH AEPB,   ADVAIR DISKUS 250-50 MCG/DOSE AEPB,   SPIRIVA HANDIHALER 18 MCG inhalation capsule,   losartan (COZAAR) 100 MG tablet, Take 1 tablet by mouth daily  metoprolol (LOPRESSOR) 25 MG tablet, Take 25 mg by mouth 2 times daily   folic acid (FOLVITE) 1 MG tablet, Take 1 mg by mouth daily. methylPREDNISolone (MEDROL) 4 MG tablet, Take 2 mg by mouth daily  Diet    DIET GENERAL;  Allergies    Patient has no known allergies. Family History    History reviewed. No pertinent family history. Sleep History    Never diagnosed with sleep apnea in the past    Social History     Social History     Socioeconomic History    Marital status:      Spouse name: Not on file    Number of children: Not on file    Years of education: Not on file    Highest education level: Not on file   Occupational History    Not on file   Social Needs    Financial resource strain: Not on file    Food insecurity     Worry: Not on file     Inability: Not on file    Transportation needs     Medical: Not on file     Non-medical: Not on file   Tobacco Use    Smoking status: Current Every Day Smoker     Packs/day: 0.50     Years: 40.00     Pack years: 20.00     Types: Cigarettes     Last attempt to quit: 2011     Years since quittin.1    Smokeless tobacco: Never Used   Substance and Sexual Activity    Alcohol use:  Yes     Alcohol/week: 0.0 standard drinks     Comment: 2 beers/day    Drug use: Never    Sexual activity: Not on file   Lifestyle    Physical activity     Days per week: Not on file     Minutes per session: Not on file    Stress: Not on file   Relationships    Social connections     Talks on phone: Not on file     Gets together: Not on file     Attends Yarsani service: Not on file     Active member of club or organization: Not on file     Attends meetings of clubs or organizations: Not on file     Relationship status: Not on file    Intimate partner violence     Fear of current or ex partner: Not on file     Emotionally abused: Not on file     Physically abused: Not on file     Forced sexual activity: Not on file   Other Topics Concern    Not on file   Social History Narrative    Not on file       Vitals     height is 5' 5\" (1.651 m) and weight is 139 lb 9.6 oz (63.3 kg). Her oral temperature is 98.2 °F (36.8 °C). Her blood pressure is 152/86 (abnormal) and her pulse is 73. Her respiration is 26 and oxygen saturation is 94%. Body mass index is 23.23 kg/m². SUPPLEMENTAL O2: O2 Flow Rate (L/min): 1 L/min     I/O        Intake/Output Summary (Last 24 hours) at 7/14/2020 1510  Last data filed at 7/14/2020 1015  Gross per 24 hour   Intake 510 ml   Output 150 ml   Net 360 ml     I/O last 3 completed shifts: In: 510 [P.O.:510]  Out: 150 [Urine:150]   Patient Vitals for the past 96 hrs (Last 3 readings):   Weight   07/14/20 0215 139 lb 9.6 oz (63.3 kg)   07/13/20 0403 140 lb 3.2 oz (63.6 kg)   07/12/20 0815 142 lb 14.4 oz (64.8 kg)       Exam   Physical Exam   Constitutional: No distress on O2 per NC. Patient appears chronically ill and thinly built. Head: Normocephalic and atraumatic. Mouth/Throat: Oropharynx is clear and moist.  No oral thrush. Eyes: Conjunctivae are normal. Pupils are equal, round. No scleral icterus. Neck: Neck supple. No tracheal deviation present. Cardiovascular: S1 and S2 with no murmur. No peripheral edema  Pulmonary/Chest: Normal effort with bilateral air entry, scattered rhonchi and wheezing throughout. No stridor. No respiratory distress. Patient exhibits no tenderness. Abdominal: Soft. Bowel sounds audible. No distension or tenderness to palp. Musculoskeletal: Moves all extremities  Neurological: Patient is alert and oriented to person, place, and time.      Labs  - Old records and notes have been reviewed in [facility]   ABG  Lab Results   Component Value Date    PH 7.36 07/12/2020    PO2 67 07/12/2020    PCO2 45 07/12/2020    HCO3 26 07/12/2020    O2SAT 92 07/12/2020     Lab Results   Component Value Date    IFIO2 3 07/12/2020     CBC  Recent Labs     07/12/20  0430 07/13/20  0359   WBC 11.9* 12.5*   RBC 3.92* 3.62*   HGB 13.2 12.1   HCT 40.3 37.2   .8* 102.8*   MCH 33.7* 33.4*   MCHC 32.8 32.5    287   MPV 8.5* 8.8*      BMP  Recent Labs     07/12/20  0430   *   K 4.2   CL 98   CO2 23   BUN 22   CREATININE 0.7   GLUCOSE 123*   CALCIUM 8.6     LFT  Recent Labs     07/12/20  0430   AST 35   ALT 26   BILITOT 0.2*   ALKPHOS 82     TROP  No results found for: TROPONINT  BNP  No results for input(s): BNP in the last 72 hours. Lactic Acid  No results for input(s): LACTA in the last 72 hours. INR  Recent Labs     07/12/20 0430   INR 0.89     PTT  No results for input(s): APTT in the last 72 hours. Glucose  No results for input(s): POCGLU in the last 72 hours. UA No results for input(s): SPECGRAV, PHUR, COLORU, CLARITYU, MUCUS, PROTEINU, BLOODU, RBCUA, WBCUA, BACTERIA, NITRU, GLUCOSEU, BILIRUBINUR, UROBILINOGEN, KETUA, LABCAST, LABCASTTY, AMORPHOS in the last 72 hours. Invalid input(s): CRYSTALS. Bed side spirometry:   Date performed: 7/14/2020  FEV1: Measurement: 0.59L, 28 % Predicted. FEV1/FVC ratio : 52  FEF 25-75%:Measurement: 0.23L, 13 % Predicted. PFTs   None in [facility]    Sleep studies   None in Epic    Cultures    None in Epic  -COVID-19 test ( SARS-CoV-2) is Negative/None detected on: 7/12/2020      EKG     Echocardiogram   None in Epic    Radiology    CXR  Jul 13, 2020   PROCEDURE: XR CHEST (2 VW)   No acute cardiopulmonary disease. Stable appearance of the chest compared to the prior study as detailed above. CT Scans  (See actual reports for details)    CT CHEST W CONTRAST    7/14/2020   Impression:  1. Mild focal area of groundglass opacity along the medial left upper lobe on axial image 25 is noted. This may represent mild focal scarring versus mild inflammatory or infectious pneumonitis.  Recommend and examined the patient independently. Face to face evaluation and examination was performed. The above evaluation and note has been reviewed. Labs and radiographs were reviewed. I Have discussed with . Chika Iraheta CNP about this patient in detail. The above assessment and plan has been reviewed. Please see my modifications mentioned below. My modifications:  She is still on 1LPM via nasal cannula. Minimal improvement in her shortness of breath. CT chest - Left upper lobe pneumonia    Plan:   Will add Rocephin to Azithromycin.  -Will send urine for legionella and streptococcal antigens.  -Follow pending cultures.  -Schedule patient for CT scan of chest without IV contrast in 3months to follow abnormal CT Chest with left upper lobe pneumonia as recommended by the radiologist.      Yulia Rowe MD 7/14/2020 6:42 PM

## 2020-07-14 NOTE — PROGRESS NOTES
[facility]  Occupational Therapy  Daily Note  Time:   Time In: 9  Time Out: 1238  Timed Code Treatment Minutes: 26 Minutes  Minutes: 26          Date: 2020  Patient Name: Mariana Viera,   Gender: female      Room: -/022-A  MRN: [id number]  : 1945  (67 y.o.)  Referring Practitioner: Dr. Marlen Gupta  Diagnosis: COPD exacerbation  Additional Pertinent Hx: 67 y.o. femalepresents to the emergency department for shortness of breath ongoing for several hours. Patient states when she went to bed she noticed it began with increasing shortness of breath. Patient is not on home oxygen. Patient does continue to smoke. Patient denies chest pain back pain fevers chills headache blurred vision weakness. Patient states she has not been admitted into the hospital    Restrictions/Precautions:  Restrictions/Precautions: Fall Risk, General Precautions  Position Activity Restriction  Other position/activity restrictions: O2    SUBJECTIVE:  Patient supinein bed  upon arrival.  RN Ok'ed treatment. PAIN: denies      BALANCE:  Sitting Balance:  Stand By Assistance at EOB. Standing Balance: Stand By Assistance. Patient completed x 2 events standing at EOB looking out window for approx 5 minutes. Patietn required verbal cues for proper breathing technique. BED MOBILITY:  Supine to Sit: Stand By Assistance    Sit to Supine: Stand By Assistance      TRANSFERS:  Sit to Stand:  Stand By Assistance. Stand to Sit: Stand By Assistance. To and from edge of bed    FUNCTIONAL MOBILITY:  Assistive Device: None  Assist Level:  Contact Guard Assistance. Distance: around edge of bed/ Patitent demonstrated shallow and quicker breathing. Education given on proper breathing technique. Patient required seated rest break after ambulation to catch breath. ADDITIONAL ACTIVITIES:. LHAE  Patient breifly educated and discussion given on LHAE.  Included various places to purchase items. Education on [facility] with ADLs and IADLs, safety with precautions. Included Home environment and AE needs for improved ADLs and IADLs    Patient reports having a walk in shower    Pt completed BUE strengthening exercises x10 reps x1 set this date with a AROM in all joints and all planes in order to increase strength and improve activity tolerance required for functional toilet & shower transfers and ADL routine. Pt tolerated well, requiring 0 rest breaks throughout task. Education given on coordinated breathing with exercises and activity for improved breathing with exercises and functional tasks. ASSESSMENT:     Activity Tolerance:  Patient tolerance of  treatment: good. Patient required rest breaks due to increased SOB wit activities. Discharge Recommendations: Home with Home health OT  Equipment Recommendations: Equipment Needed: No  Plan: Times per week: 3-5x  Current Treatment Recommendations: Strengthening, Endurance Training, Patient/Caregiver Education & Training, Self-Care / ADL, Balance Training, Functional Mobility Training, Safety Education & Training    Patient Education  Patient Education: Plan of Care, Home Exercise Program and Equipment Education    Goals  Short term goals  Time Frame for Short term goals: by discharge  Short term goal 1: Pt to complete BADL routine with SBA and no vcs for safety  Short term goal 2: Pt to be educated on energy conservation tech and demo good follow through of 1-2 tech with no vcs `    Following session, patient left in safe position with all fall risk precautions in place.

## 2020-07-14 NOTE — PROGRESS NOTES
Dr. Yamini Sexton note       Internal Medicine              Patient:  Jones Iraheta  YOB: 1945    MRN: [id number]    Acct:  [de-identified]   8B-22/022-A  Primary Care Physician: AUSTIN Beverly - CNP    Admit Date: 7/12/2020           Subjective: feels better but still with exp wheezes. Objective:      Physical Exam:    Vitals:  Patient Vitals for the past 24 hrs:   BP Temp Temp src Pulse Resp SpO2 Weight   07/14/20 1107 (!) 152/86 98.2 °F (36.8 °C) Oral 73 26 94 % --   07/14/20 0830 (!) 144/97 97.9 °F (36.6 °C) Oral 82 24 94 % --   07/14/20 0737 -- -- -- -- -- 93 % --   07/14/20 0254 (!) 147/88 97.9 °F (36.6 °C) Oral 80 28 93 % --   07/14/20 0238 -- -- -- -- 24 93 % --   07/14/20 0215 -- -- -- -- -- -- 139 lb 9.6 oz (63.3 kg)   07/13/20 2050 (!) 157/86 98.1 °F (36.7 °C) Oral 108 16 93 % --   07/13/20 2011 -- -- -- -- 16 94 % --   07/13/20 1526 (!) 140/66 98.3 °F (36.8 °C) Oral 93 16 93 % --     Weight: Weight: 139 lb 9.6 oz (63.3 kg)     24 hour intake/output:    Intake/Output Summary (Last 24 hours) at 7/14/2020 1509  Last data filed at 7/14/2020 1015  Gross per 24 hour   Intake 510 ml   Output 150 ml   Net 360 ml       General appearance - alert,oriented to person, place  NECK:  Supple. Trachea is central.  LUNGS:  Moderate air movement, still experiencing some expiratory  wheezes. CARDIOVASCULAR SYSTEM:  Showed diffuse PMI. S1 and S2 well heard. No  gallop. ABDOMEN:  Soft and there is no tenderness. Positive bowel sounds. No  palpable enlarged organs. NEUROLOGIC:  The patient is alert, awake, responsive to commands  appropriately without any focal deficits. EXTREMITIES:  Shows no edema. SKIN:  Shows some areas of bruises with loose skin.       Review of Labs and Diagnostic Testing:    CBC:   Recent Labs     07/13/20  0359   WBC 12.5*   HGB 12.1   HCT 37.2   .8*        BMP:   Recent Labs 07/12/20  0430   *   K 4.2   CL 98   CO2 23   BUN 22   CREATININE 0.7   CALCIUM 8.6   GLUCOSE 123*     PT/INR:   Recent Labs     07/12/20 0430   INR 0.89     APTT: No results for input(s): APTT in the last 72 hours. Lipids:   Recent Labs     07/12/20 0430   ALKPHOS 82   ALT 26   AST 35   BILITOT 0.2*   BILIDIR <0.2   LABALBU 4.1     Troponin: No results for input(s): TROPONINI in the last 72 hours. Imaging:  Xr Chest Standard (2 Vw)    Result Date: 7/13/2020  PROCEDURE: XR CHEST (2 VW) CLINICAL INFORMATION: sob. COMPARISON: Chest x-ray dated 7/12/2020 TECHNIQUE: Frontal and lateral views of the chest were obtained. FINDINGS: Lungs/pleura: Lungs are hyperinflated consistent with COPD. No pneumonia, pulmonary edema, or obvious mass. Interstitium remains prominent consistent with pulmonary fibrosis. No pleural effusion. No pneumothorax. Heart: There is stable mild cardiomegaly. Mediastinum/geoffrey: No obvious mass or adenopathy. Skeleton: Again noted are multiple old healed right rib fractures. There is an old mild to moderate T12 compression fracture deformity. No acute cardiopulmonary disease. Stable appearance of the chest compared to the prior study as detailed above. **This report has been created using voice recognition software. It may contain minor errors which are inherent in voice recognition technology. ** Final report electronically signed by Dr. Meggan Rose on 7/13/2020 6:36 AM    Xr Chest Portable    Result Date: 7/12/2020  PROCEDURE: XR CHEST PORTABLE CLINICAL INFORMATION: SOB. COMPARISON: Chest x-ray dated 1/3/2008 TECHNIQUE: AP Portable chest xray FINDINGS: Lines/tubes/devices: none Lungs/pleura: Lungs are hyperinflated consistent with COPD. There are bilateral interstitial infiltrates which probably represent interstitial pulmonary fibrosis however cannot exclude mild interstitial pulmonary edema or an atypical viral-type pneumonia. Correlate clinically. There is no focal pneumonia. No pleural effusion. No pneumothorax. Heart: There is mild cardiomegaly. Mediastinum/geoffrey: No obvious mass or adenopathy. Skeleton: Again noted are multiple old right rib fracture deformities. No acute pneumonia. Interstitial infiltrates, likely pulmonary fibrosis. See discussion above and correlate clinically. COPD. Cardiomegaly. **This report has been created using voice recognition software. It may contain minor errors which are inherent in voice recognition technology. ** Final report electronically signed by Dr. Ilir Lemos on 7/12/2020 5:03 AM      EKG:      Diet: DIET GENERAL;        Data:   Scheduled Meds: Scheduled Meds:   nicotine  1 patch Transdermal Q24H    methylPREDNISolone  40 mg Intravenous Q12H    sodium chloride flush  10 mL Intravenous 2 times per day    enoxaparin  40 mg Subcutaneous Q24H    ipratropium-albuterol  1 ampule Inhalation Q4H WA    amLODIPine  5 mg Oral Daily    citalopram  20 mg Oral Daily    folic acid  1 mg Oral Daily    losartan  100 mg Oral Daily    metoprolol tartrate  25 mg Oral BID    pantoprazole  40 mg Oral QAM AC     Continuous Infusions:  PRN Meds:. ALPRAZolam, sodium chloride flush, acetaminophen **OR** acetaminophen, polyethylene glycol, promethazine **OR** ondansetron  Continuous Infusions:      Assessment   Active Problems:    HTN (hypertension)    COPD exacerbation (HCC)    Nicotine abuse  Resolved Problems:    * No resolved hospital problems.  *        Patient Active Problem List   Diagnosis    Nephrotic syndrome    HTN (hypertension)    Lower extremity edema    Membranous glomerulonephritis    Proteinuria    Anemia    Edema of both legs    Dyslipidemia    Hyponatremia    COPD exacerbation ([address]    Nicotine abuse        Plan   Will admit  Cont present treatment  Pt/ot        Electronically signed by Tc Rodgers MD on 7/14/2020 at 3:09 PM  Over 35 mins spent on this evaluation

## 2020-07-14 NOTE — CARE COORDINATION
7/14/20, 10:53 AM EDT    DISCHARGE ON [facility] day: 0  Location: -22/022-A Reason for admit: COPD exacerbation ([facility] [address] [J44.1]   Procedure: No  Treatment Plan of Care: Resp Therapy completed PFT. Pt continues with expiratory wheezes. Duonebs q 4 hr. Solumedrol. Will need home O2 eval prior to discharge. Encourage smoking cessation. PT/OT. CT of Chest planned for today. Barriers to Discharge: Medical readiness. PCP: AUSTIN Erwin - CNP   %  Patient Goals/Plan/Treatment Preferences: Plans return home with [address] SW following.

## 2020-07-14 NOTE — PLAN OF CARE
Problem: Impaired respiratory status  Goal: Clear lung sounds  Description: Clear lung sounds  7/13/2020 2014 by Billy Fields RCP  Outcome: Ongoing  Note: Patient is receiving nebulizer treatments to improve aeration. Current treatment regimen is appropriate.

## 2020-07-15 PROCEDURE — 94640 AIRWAY INHALATION TREATMENT: CPT

## 2020-07-15 PROCEDURE — APPSS30 APP SPLIT SHARED TIME 16-30 MINUTES: Performed by: NURSE PRACTITIONER

## 2020-07-15 PROCEDURE — 87070 CULTURE OTHR SPECIMN AEROBIC: CPT

## 2020-07-15 PROCEDURE — 97535 SELF CARE MNGMENT TRAINING: CPT

## 2020-07-15 PROCEDURE — 2700000000 HC OXYGEN THERAPY PER DAY

## 2020-07-15 PROCEDURE — 97110 THERAPEUTIC EXERCISES: CPT

## 2020-07-15 PROCEDURE — 94760 N-INVAS EAR/PLS OXIMETRY 1: CPT

## 2020-07-15 PROCEDURE — 6360000002 HC RX W HCPCS: Performed by: INTERNAL MEDICINE

## 2020-07-15 PROCEDURE — 87205 SMEAR GRAM STAIN: CPT

## 2020-07-15 PROCEDURE — 97116 GAIT TRAINING THERAPY: CPT

## 2020-07-15 PROCEDURE — 87077 CULTURE AEROBIC IDENTIFY: CPT

## 2020-07-15 PROCEDURE — 6370000000 HC RX 637 (ALT 250 FOR IP): Performed by: INTERNAL MEDICINE

## 2020-07-15 PROCEDURE — 2580000003 HC RX 258: Performed by: INTERNAL MEDICINE

## 2020-07-15 PROCEDURE — 6360000002 HC RX W HCPCS: Performed by: NURSE PRACTITIONER

## 2020-07-15 PROCEDURE — 6370000000 HC RX 637 (ALT 250 FOR IP): Performed by: NURSE PRACTITIONER

## 2020-07-15 PROCEDURE — 1200000003 HC TELEMETRY R&B

## 2020-07-15 PROCEDURE — 99232 SBSQ HOSP IP/OBS MODERATE 35: CPT | Performed by: INTERNAL MEDICINE

## 2020-07-15 RX ORDER — PREDNISONE 10 MG/1
10 TABLET ORAL DAILY
Status: DISCONTINUED | OUTPATIENT
Start: 2020-07-25 | End: 2020-07-16 | Stop reason: HOSPADM

## 2020-07-15 RX ORDER — PREDNISONE 20 MG/1
20 TABLET ORAL DAILY
Status: DISCONTINUED | OUTPATIENT
Start: 2020-07-22 | End: 2020-07-16 | Stop reason: HOSPADM

## 2020-07-15 RX ORDER — PREDNISONE 20 MG/1
40 TABLET ORAL DAILY
Status: DISCONTINUED | OUTPATIENT
Start: 2020-07-16 | End: 2020-07-16 | Stop reason: HOSPADM

## 2020-07-15 RX ADMIN — IPRATROPIUM BROMIDE AND ALBUTEROL SULFATE 1 AMPULE: .5; 3 SOLUTION RESPIRATORY (INHALATION) at 13:07

## 2020-07-15 RX ADMIN — METOPROLOL TARTRATE 25 MG: 25 TABLET ORAL at 09:11

## 2020-07-15 RX ADMIN — METHYLPREDNISOLONE SODIUM SUCCINATE 40 MG: 40 INJECTION, POWDER, FOR SOLUTION INTRAMUSCULAR; INTRAVENOUS at 18:33

## 2020-07-15 RX ADMIN — FOLIC ACID 1 MG: 1 TABLET ORAL at 09:11

## 2020-07-15 RX ADMIN — AMLODIPINE BESYLATE 5 MG: 5 TABLET ORAL at 09:11

## 2020-07-15 RX ADMIN — CEFTRIAXONE SODIUM 1 G: 1 INJECTION, POWDER, FOR SOLUTION INTRAMUSCULAR; INTRAVENOUS at 22:23

## 2020-07-15 RX ADMIN — ENOXAPARIN SODIUM 40 MG: 40 INJECTION SUBCUTANEOUS at 18:33

## 2020-07-15 RX ADMIN — PANTOPRAZOLE SODIUM 40 MG: 40 TABLET, DELAYED RELEASE ORAL at 06:14

## 2020-07-15 RX ADMIN — AZITHROMYCIN DIHYDRATE 500 MG: 250 TABLET, FILM COATED ORAL at 18:33

## 2020-07-15 RX ADMIN — SODIUM CHLORIDE, PRESERVATIVE FREE 10 ML: 5 INJECTION INTRAVENOUS at 09:13

## 2020-07-15 RX ADMIN — METOPROLOL TARTRATE 25 MG: 25 TABLET ORAL at 22:18

## 2020-07-15 RX ADMIN — SODIUM CHLORIDE, PRESERVATIVE FREE 10 ML: 5 INJECTION INTRAVENOUS at 00:34

## 2020-07-15 RX ADMIN — IPRATROPIUM BROMIDE AND ALBUTEROL SULFATE 1 AMPULE: .5; 3 SOLUTION RESPIRATORY (INHALATION) at 09:20

## 2020-07-15 RX ADMIN — IPRATROPIUM BROMIDE AND ALBUTEROL SULFATE 1 AMPULE: .5; 3 SOLUTION RESPIRATORY (INHALATION) at 17:04

## 2020-07-15 RX ADMIN — IPRATROPIUM BROMIDE AND ALBUTEROL SULFATE 1 AMPULE: .5; 3 SOLUTION RESPIRATORY (INHALATION) at 20:55

## 2020-07-15 RX ADMIN — SODIUM CHLORIDE, PRESERVATIVE FREE 10 ML: 5 INJECTION INTRAVENOUS at 22:18

## 2020-07-15 RX ADMIN — GUAIFENESIN 600 MG: 600 TABLET, EXTENDED RELEASE ORAL at 22:18

## 2020-07-15 RX ADMIN — GUAIFENESIN 600 MG: 600 TABLET, EXTENDED RELEASE ORAL at 09:11

## 2020-07-15 RX ADMIN — LOSARTAN POTASSIUM 100 MG: 100 TABLET, FILM COATED ORAL at 09:11

## 2020-07-15 RX ADMIN — METHYLPREDNISOLONE SODIUM SUCCINATE 40 MG: 40 INJECTION, POWDER, FOR SOLUTION INTRAMUSCULAR; INTRAVENOUS at 06:15

## 2020-07-15 RX ADMIN — CITALOPRAM 20 MG: 20 TABLET, FILM COATED ORAL at 09:11

## 2020-07-15 ASSESSMENT — PAIN SCALES - GENERAL
PAINLEVEL_OUTOF10: 0
PAINLEVEL_OUTOF10: 0

## 2020-07-15 NOTE — PLAN OF CARE
Problem: Impaired respiratory status  Goal: Clear lung sounds  Description: Clear lung sounds  Outcome: Ongoing  Note: Fine crackles in lower bases. Problem: Falls - Risk of:  Goal: Will remain free from falls  Description: Will remain free from falls  Outcome: Ongoing  Note: Patient uses call light for all needs. Overbed table within reach. Call light within reach. Hourly rounds complete and needs are made known during rounds. Goal: Absence of physical injury  Description: Absence of physical injury  Outcome: Ongoing  Note: Patient uses call light for all needs. Overbed table within reach. Call light within reach. Hourly rounds complete and needs are made known during rounds. Problem: Discharge Planning:  Goal: Discharged to appropriate level of care  Description: Discharged to appropriate level of care  Outcome: Ongoing  Note: Patient will be discharged home with family. Problem: Activity Intolerance:  Goal: Ability to tolerate increased activity will improve  Description: Ability to tolerate increased activity will improve  Outcome: Ongoing  Note: Patient able to walk to restroom and back with shortness of breath. Problem: Breathing Pattern - Ineffective:  Goal: Ability to achieve and maintain a regular respiratory rate will improve  Description: Ability to achieve and maintain a regular respiratory rate will improve  Outcome: Ongoing  Note: Patient has shortness of breath on exertion. Fine crackles in lower bases of lungs. Pulse ox 93%. Problem: Gas Exchange - Impaired:  Goal: Levels of oxygenation will improve  Description: Levels of oxygenation will improve  Outcome: Ongoing  Note: Patient has shortness of breath on exertion. Fine crackles in lower bases of lungs. Pulse ox 93%. Problem: DISCHARGE BARRIERS  Goal: Patient's continuum of care needs are met  Outcome: Ongoing  Note: Care needs are being met. Care plan reviewed with patient.   Patient verbalizes understanding of

## 2020-07-15 NOTE — CARE COORDINATION
7/15/20, 12:12 PM EDT    DISCHARGE ON 49 [facility] day: 1  Location: [facility]22/022-A Reason for admit: COPD exacerbation ([facility] 75.) [J44.1]  COPD (chronic obstructive pulmonary disease) ([facility] 75.) [J44.9]   Procedure: No  Treatment Plan of Care: Oral Zithromax and IV Rocephin for questionable pneumonia. O2 at 1L/NC, attempt to wean. Pulmonary seeing and ordered Mucinex. Continue Solumedrol. PT/OT. Will likely need home O2 eval prior to discharge. Barriers to Discharge: Readiness. PCP: Raylene Cheadle , APRN - CNP  Readmission Risk Score: 13%  Patient Goals/Plan/Treatment Preferences: Return home with son and new HH. May need home O2 eval prior to discharge.

## 2020-07-15 NOTE — PLAN OF CARE
Problem: Impaired respiratory status  Goal: Clear lung sounds  Description: Clear lung sounds  7/15/2020 0929 by Stu Arango RCP  Outcome: Ongoing

## 2020-07-15 NOTE — PROGRESS NOTES
[address] [address]  INPATIENT PHYSICAL THERAPY  DAILY NOTE  [facility] SURG 8B - 8B-22/022-A    Time In:   Time Out: 1013  Timed Code Treatment Minutes: 27 Minutes  Minutes: 27          Date: 7/15/2020  Patient Name: Edith Welch,  Gender:  female        MRN: [id number]  : 1945  (67 y.o.)     Referring Practitioner: Dr. Susan Wing  Diagnosis: COPD exacerbation  Additional Pertinent Hx: admit with above diagnosis     Prior Level of Function:  Lives With: Son(handicap)  Type of Home: House  Home Layout: Multi-level, Able to Live on Main level with bedroom/bathroom  Home Access: Ramped entrance  Home Equipment: (no AD used PTA)   Bathroom Shower/Tub: Walk-in shower  Bathroom Toilet: Handicap height  Bathroom Accessibility: Accessible    ADL Assistance: Independent  Homemaking Assistance: Independent  Ambulation Assistance: Independent  Transfer Assistance: Independent  Additional Comments: Pt stating she was indep at home completing all homemaking tasks. Pt stating her son is indep as well    Restrictions/Precautions:  Restrictions/Precautions: Fall Risk, General Precautions  Position Activity Restriction  Other position/activity restrictions: O2    SUBJECTIVE: Nursing staff approved session. Patient laying in bed upon arrival and agreeable to therapy with little encouragement.      PAIN: denies    OBJECTIVE:  Bed Mobility:  Supine to Sit: Modified Independent  Sit to Supine: Modified Independent     Transfers:  Sit to Stand: Supervision  Stand to Sit:Supervision    Ambulation:  Stand By Assistance, Contact Guard Assistance, X 1  Distance: ~60ft x1  Surface: Level Tile  Device:No Device  Gait Deviations:  Slow Sena, Decreased Step Length Bilaterally, Decreased Arm Swing, Decreased Trunk Rotation, Decreased Gait Speed, Decreased Heel Strike Bilaterally, Mild Path Deviations and fairly steady, no LOB, improved gait from last session    Balance:  Dynamic Standing Balance: Supervision, BUE support during LE exercises    Exercise:  Patient was guided in 1 set(s) 15 reps of exercise to both lower extremities. Heelslides, Hip abduction/adduction, Standing heel/toe raises, Standing marches, Standing hip abduction/adduction, Standing hip flexion and Mini squats. Exercises were completed for increased independence with functional mobility. Functional Outcome Measures: Completed  AM-PAC Inpatient Mobility Raw Score : 20  AM-PAC Inpatient T-Scale Score : 47.67    ASSESSMENT:  Assessment: Patient progressing toward established goals. Activity Tolerance:  Patient tolerance of  treatment: good. Equipment Recommendations: Other: cont to assess pt needs  Discharge Recommendations:  Continue to assess pending progress, Patient would benefit from continued therapy after discharge, 24 hour supervision or assist, Home with Home health PT    Plan: Times per week: 5X GM  Times per day: Daily  Specific instructions for Next Treatment: therex and mobility    Patient Education  Patient Education: Plan of Care, Bed Mobility, Transfers, Gait, Verbal Exercise Instruction    Goals:  Patient goals : return home, breath better  Short term goals  Time Frame for Short term goals: by discharge  Short term goal 1: bed mobility with MOD I to get in/out of bed  Short term goal 2: transfer with MOD I to get in/out of chairs  Short term goal 3: amb >75'x1 without AD and MOD I to walk safely in home  Short term goal 4: negotiate 1 step without HR and MOD I to enter home safely  Long term goals  Time Frame for Long term goals : no LTGs set secondary to short ELOS    Following session, patient left in safe position with all fall risk precautions in place.

## 2020-07-15 NOTE — PROGRESS NOTES
worsening of her above symptoms she presented to the Emergency room at [facility] by calling 911. She was evaluated in the Emergency room by ER physician and admitted under MD [facility]anca service for further evaluation. She had a Hx of fall on her right side of chest Dec 24 2007. She subsequently developed fracture of right side ribs leading to ? Hemothorax. She signed her self from [facility] in 2008. She was subsequently admitted to [facility]. She underwent chest tube placement with subsequent removal. ? Fibrothorax. Past 24 hrs   -On 1 LPM via NC  -Feels SOB improving  -Cough more productive ongoing wheeze less than previously  All other systems reviewed  PMHx   Past Medical History      Diagnosis Date    Anemia     Dyslipidemia     HLD (hyperlipidemia)     HTN (hypertension)     Lower extremity edema     Membranous glomerulonephritis     Nephrotic syndrome     Proteinuria       Past Surgical History    History reviewed. No pertinent surgical history. Meds    Current Medications    guaiFENesin  600 mg Oral BID    azithromycin  500 mg Oral Daily    cefTRIAXone (ROCEPHIN) IV  1 g Intravenous Q24H    nicotine  1 patch Transdermal Q24H    methylPREDNISolone  40 mg Intravenous Q12H    sodium chloride flush  10 mL Intravenous 2 times per day    enoxaparin  40 mg Subcutaneous Q24H    ipratropium-albuterol  1 ampule Inhalation Q4H WA    amLODIPine  5 mg Oral Daily    citalopram  20 mg Oral Daily    folic acid  1 mg Oral Daily    losartan  100 mg Oral Daily    metoprolol tartrate  25 mg Oral BID    pantoprazole  40 mg Oral QAM AC     ALPRAZolam, sodium chloride flush, acetaminophen **OR** acetaminophen, polyethylene glycol, promethazine **OR** ondansetron  IV Drips/Infusions    Home Medications  Medications Prior to Admission: LORazepam (ATIVAN) 2 MG tablet, Take 2 mg by mouth every 6 hours as needed for Anxiety.   amLODIPine (NORVASC) 5 MG tablet, Take 5 mg by mouth daily   citalopram (CELEXA) 20 MG tablet, Take 20 mg by mouth daily   famotidine (PEPCID) 40 MG tablet, Take 40 mg by mouth daily   omeprazole (PRILOSEC) 20 MG delayed release capsule, Take 20 mg by mouth Daily   INCRUSE ELLIPTA 62.5 MCG/INH AEPB,   ADVAIR DISKUS 250-50 MCG/DOSE AEPB,   SPIRIVA HANDIHALER 18 MCG inhalation capsule,   losartan (COZAAR) 100 MG tablet, Take 1 tablet by mouth daily  metoprolol (LOPRESSOR) 25 MG tablet, Take 25 mg by mouth 2 times daily   folic acid (FOLVITE) 1 MG tablet, Take 1 mg by mouth daily. methylPREDNISolone (MEDROL) 4 MG tablet, Take 2 mg by mouth daily  Diet    DIET GENERAL;  Allergies    Patient has no known allergies. Family History    History reviewed. No pertinent family history. Sleep History    Never diagnosed with sleep apnea in the past    Social History     Social History     Socioeconomic History    Marital status:      Spouse name: Not on file    Number of children: Not on file    Years of education: Not on file    Highest education level: Not on file   Occupational History    Not on file   Social Needs    Financial resource strain: Not on file    Food insecurity     Worry: Not on file     Inability: Not on file    Transportation needs     Medical: Not on file     Non-medical: Not on file   Tobacco Use    Smoking status: Current Every Day Smoker     Packs/day: 0.50     Years: 40.00     Pack years: 20.00     Types: Cigarettes     Last attempt to quit: 2011     Years since quittin.1    Smokeless tobacco: Never Used   Substance and Sexual Activity    Alcohol use:  Yes     Alcohol/week: 0.0 standard drinks     Comment: 2 beers/day    Drug use: Never    Sexual activity: Not on file   Lifestyle    Physical activity     Days per week: Not on file     Minutes per session: Not on file    Stress: Not on file   Relationships    Social connections     Talks on phone: Not on file     Gets together: Not on file     Attends Anabaptism service: Not notes have been reviewed in [facility]   ABG  Lab Results   Component Value Date    PH 7.36 07/12/2020    PO2 67 07/12/2020    PCO2 45 07/12/2020    HCO3 26 07/12/2020    O2SAT 92 07/12/2020     Lab Results   Component Value Date    IFIO2 3 07/12/2020     CBC  Recent Labs     07/13/20  0359   WBC 12.5*   RBC 3.62*   HGB 12.1   HCT 37.2   .8*   MCH 33.4*   MCHC 32.5      MPV 8.8*      BMP  No results for input(s): NA, K, CL, CO2, BUN, CREATININE, GLUCOSE, MG, PHOS, CALCIUM, IONCA, MG in the last 72 hours. LFT  No results for input(s): AST, ALT, ALB, BILITOT, ALKPHOS, LIPASE in the last 72 hours. Invalid input(s): AMYLASE  TROP  No results found for: TROPONINT  BNP  No results for input(s): BNP in the last 72 hours. Lactic Acid  No results for input(s): LACTA in the last 72 hours. INR  No results for input(s): INR, PROTIME in the last 72 hours. PTT  No results for input(s): APTT in the last 72 hours. Glucose  No results for input(s): POCGLU in the last 72 hours. UA No results for input(s): SPECGRAV, PHUR, COLORU, CLARITYU, MUCUS, PROTEINU, BLOODU, RBCUA, WBCUA, BACTERIA, NITRU, GLUCOSEU, BILIRUBINUR, UROBILINOGEN, KETUA, LABCAST, LABCASTTY, AMORPHOS in the last 72 hours. Invalid input(s): CRYSTALS. Bed side spirometry:   Date performed: 7/14/2020  FEV1: Measurement: 0.59L, 28 % Predicted. FEV1/FVC ratio : 52  FEF 25-75%:Measurement: 0.23L, 13 % Predicted. PFTs   None in [facility]    Sleep studies   None in Epic    Cultures    None in Epic  -COVID-19 test ( SARS-CoV-2) is Negative/None detected on: 7/12/2020      EKG     Echocardiogram   None in Epic    Radiology    CXR  Jul 13, 2020   PROCEDURE: XR CHEST (2 VW)   No acute cardiopulmonary disease. Stable appearance of the chest compared to the prior study as detailed above. CT Scans  (See actual reports for details)    CT CHEST W CONTRAST    7/14/2020   Impression:  1.  Mild focal area of groundglass opacity along the medial left upper lobe on axial image 25 is noted. This may represent mild focal scarring versus mild inflammatory or infectious pneumonitis. Recommend 3 month follow-up CT imaging to document stability  or resolution. 2. Mild parenchymal opacity is demonstrated at the right lung base abutting the pleura with mild nodular thickening on axial image 56. This may represent focal scarring. However, this can be evaluated on subsequent follow-up imaging to document stability. 3. Nonspecific mild perinephric stranding is seen. However no hydronephrosis is seen associated with this finding. CT chest done in 1/4/2008:  No official report is available in [address] [place] [address] Rd. Assessment   -Acute exacerbation of COPD due to left upper lobe pneumonia.  -Left upper lobe pneumonia due to CAP Vs atypical pneumonia.  -Chronic Hx of tobacco abuse- she is still smoking 4 to 5 cigarettes per day. -COPD of unknown severity-bedside PFT shows very severe obstruction  -Hx of fall on her right side of chest in December 24th, 2007. She subsequently developed fracture of right side ribs leading to ? Hemothorax. She signed her self from [facility] in 2008. She was subsequently admitted to [facility]. She underwent chest tube placement with subsequent removal. ? Fibrothorax. -HTN (hypertension). -Nephrotic syndrome  Plan   -Will continue Solumedrol to 40mg IV BID today start prednisone taper in AM  -Send Sputum cultures. -Duonebs 3ml via nebs Q4h while awake. -Guaifenesin 600 mg PO BID  -Rocephin 1g IV Q 24 hrs  -Azithromycin 500 mg PO daily x3 days for COPD exacerbation with very severe obstruction  -Will resume Advair,Incruse and albuterol at the time of discharge.  -Home O2 eval prior to discharge. - Patient educated to quit smoking as soon as possible. Patient verbalizes understanding of adverse consequences of tobacco smoking.  -Titrate Oxygen to keep Spo2 >90%.   -Deep Venous Thrombosis Prophylaxis: lovenox.  -Will monitor transition to PO prednisone in AM if Stable from Pulmonary standpoint, will schedule patient for follow-up at [facility] [place] in 3 months with Full PFT and CT chest WO contrast approximately 5 days prior to appointment with Kacie Franco CNP     -Case discussed and management plan reviewed with RN [address] educated about my impression and plan. She verbalizes understanding. Questions and concerns addressed. Electronically signed by   AUSTIN Emerson - CNP on 7/15/2020 at 3:26 PM     Addendum by Dr. Valentine Salmeron MD:  I have seen and examined the patient independently. Face to face evaluation and examination was performed. The above evaluation and note has been reviewed. Labs and radiographs were reviewed. I Have discussed with Mr. Kacie Franco CNP about this patient in detail. The above assessment and plan has been reviewed. Please see my modifications mentioned below. My modifications:  She is still wheezing bilaterally. Not in any distress. PT/OT  Follow pending cultures.     Mercy Macias MD 7/15/2020 4:06 PM

## 2020-07-15 NOTE — PROGRESS NOTES
[facility] 8B  Occupational Therapy  Daily Note  Time:   Time In: 3  Time Out: 1507  Timed Code Treatment Minutes: 23 Minutes  Minutes: 23          Date: 7/15/2020  Patient Name: Demetrio Galloway,   Gender: female      Room: -/022-A  MRN: [id number]  : 1945  (67 y.o.)  Referring Practitioner: Dr. Greta Palmer  Diagnosis: COPD exacerbation  Additional Pertinent Hx: 67 y.o. femalepresents to the emergency department for shortness of breath ongoing for several hours. Patient states when she went to bed she noticed it began with increasing shortness of breath. Patient is not on home oxygen. Patient does continue to smoke. Patient denies chest pain back pain fevers chills headache blurred vision weakness. Patient states she has not been admitted into the hospital    Restrictions/Precautions:  Restrictions/Precautions: Fall Risk, General Precautions  Position Activity Restriction  Other position/activity restrictions: O2    SUBJECTIVE: Pt lying supine upon arrival, agreeable to OT session. RN okayed therapy session. PAIN: Denies. COGNITION: WFL    ADL:   Grooming: Supervision. Toileting: Supervision. Toilet Transfer: Supervision. BALANCE:  Sitting Balance:  Modified Independent. Standing Balance: Supervision. x3 minutes standing sink side for grooming tasks. BED MOBILITY:  Supine to Sit: Modified Independent   Sit to Supine: Modified Independent     TRANSFERS:  Sit to Stand:  Supervision. Stand to Sit: Supervision. FUNCTIONAL MOBILITY:  Assistive Device: None  Assist Level:  Supervision. Distance:   Completed functional mobility short distance within unit hallway at slow pace, no LOB noted. Pt requires seated rest break after trial of mobility, min fatigue noted with pt initiating deep breathing throughout tasks.      ADDITIONAL ACTIVITIES:  Completed BUE exercises x10 reps x1 sets using min resistance band in all joints/planes to increase strength and endurance required for ADLs. Pt required rest break between each exercise and min v/c for proper technique. ASSESSMENT:     Activity Tolerance:  Patient tolerance of  treatment: good. Discharge Recommendations: Home with Home health OT  Equipment Recommendations: Equipment Needed: No  Plan: Times per week: 3-5x  Current Treatment Recommendations: Strengthening, Endurance Training, Patient/Caregiver Education & Training, Self-Care / ADL, Balance Training, Functional Mobility Training, Safety Education & Training    Patient Education  Patient Education: ADL's, Home Exercise Program, Importance of Increasing Activity and Safety with transfers and mobility. Goals  Short term goals  Time Frame for Short term goals: by discharge  Short term goal 1: Pt to complete BADL routine with SBA and no vcs for safety  Short term goal 2: Pt to be educated on energy conservation tech and demo good follow through of 1-2 tech with no vcs `    Following session, patient left in safe position with all fall risk precautions in place.

## 2020-07-15 NOTE — PROGRESS NOTES
Dr. Delaine Ganser note       Internal Medicine              Patient:  Jhonatan Galan  YOB: 1945    MRN: [id number]    Acct:  [de-identified]   8B-22/[id number]  Primary Care Physician: AUSTIN Shi CNP    Admit Date: 7/12/2020           Subjective: stable    Objective:      Physical Exam:    Vitals:  Patient Vitals for the past 24 hrs:   BP Temp Temp src Pulse Resp SpO2 Weight   07/15/20 1057 (!) 148/80 98.3 °F (36.8 °C) Oral 81 18 94 % --   07/15/20 0930 -- -- -- -- -- 96 % --   07/15/20 0927 -- -- -- -- -- 99 % --   07/15/20 0900 (!) 160/83 98.3 °F (36.8 °C) Oral 78 26 95 % --   07/15/20 0428 (!) 157/89 98.6 °F (37 °C) Oral 108 22 95 % 146 lb 8 oz (66.5 kg)   07/14/20 2338 131/82 -- -- -- -- -- --   07/14/20 1945 (!) 182/86 98.9 °F (37.2 °C) Oral 99 22 93 % --   07/14/20 1537 (!) 140/76 98.5 °F (36.9 °C) Oral 88 22 92 % --     Weight: Weight: 146 lb 8 oz (66.5 kg)     24 hour intake/output:    Intake/Output Summary (Last 24 hours) at 7/15/2020 1210  Last data filed at 7/15/2020 0428  Gross per 24 hour   Intake 500 ml   Output --   Net 500 ml       General appearance - alert,oriented to person, place  NECK:  Supple. Trachea is central.  LUNGS:  Moderate air movement, still experiencing some expiratory  wheezes. CARDIOVASCULAR SYSTEM:  Showed diffuse PMI. S1 and S2 well heard. No  gallop. ABDOMEN:  Soft and there is no tenderness. Positive bowel sounds. No  palpable enlarged organs. NEUROLOGIC:  The patient is alert, awake, responsive to commands  appropriately without any focal deficits. EXTREMITIES:  Shows no edema. SKIN:  Shows some areas of bruises with loose skin.       Review of Labs and Diagnostic Testing:    CBC:   Recent Labs     07/13/20  0359   WBC 12.5*   HGB 12.1   HCT 37.2   .8*        BMP:   No results for input(s): NA, K, CL, CO2, PHOS, BUN, CREATININE, CALCIUM, GLUCOSE in the last 72 hours.  PT/INR:   No results for input(s): PROTIME, INR in the last 72 hours. APTT: No results for input(s): APTT in the last 72 hours. Lipids:   No results for input(s): ALKPHOS, ALT, AST, BILITOT, BILIDIR, LABALBU, AMYLASE, LIPASE in the last 72 hours. Troponin: No results for input(s): TROPONINI in the last 72 hours. Imaging:  Xr Chest Standard (2 Vw)    Result Date: 7/13/2020  PROCEDURE: XR CHEST (2 VW) CLINICAL INFORMATION: sob. COMPARISON: Chest x-ray dated 7/12/2020 TECHNIQUE: Frontal and lateral views of the chest were obtained. FINDINGS: Lungs/pleura: Lungs are hyperinflated consistent with COPD. No pneumonia, pulmonary edema, or obvious mass. Interstitium remains prominent consistent with pulmonary fibrosis. No pleural effusion. No pneumothorax. Heart: There is stable mild cardiomegaly. Mediastinum/geoffrey: No obvious mass or adenopathy. Skeleton: Again noted are multiple old healed right rib fractures. There is an old mild to moderate T12 compression fracture deformity. No acute cardiopulmonary disease. Stable appearance of the chest compared to the prior study as detailed above. **This report has been created using voice recognition software. It may contain minor errors which are inherent in voice recognition technology. ** Final report electronically signed by Dr. Nayely Malone on 7/13/2020 6:36 AM    Xr Chest Portable    Result Date: 7/12/2020  PROCEDURE: XR CHEST PORTABLE CLINICAL INFORMATION: SOB. COMPARISON: Chest x-ray dated 1/3/2008 TECHNIQUE: AP Portable chest xray FINDINGS: Lines/tubes/devices: none Lungs/pleura: Lungs are hyperinflated consistent with COPD. There are bilateral interstitial infiltrates which probably represent interstitial pulmonary fibrosis however cannot exclude mild interstitial pulmonary edema or an atypical viral-type pneumonia. Correlate clinically. There is no focal pneumonia. No pleural effusion. No pneumothorax. Heart: There is mild cardiomegaly. Mediastinum/geoffrey: No obvious mass or adenopathy. Skeleton: Again noted are multiple old right rib fracture deformities. No acute pneumonia. Interstitial infiltrates, likely pulmonary fibrosis. See discussion above and correlate clinically. COPD. Cardiomegaly. **This report has been created using voice recognition software. It may contain minor errors which are inherent in voice recognition technology. ** Final report electronically signed by Dr. Jaleesa Clayton on 7/12/2020 5:03 AM      EKG:      Diet: DIET GENERAL;        Data:   Scheduled Meds: Scheduled Meds:   guaiFENesin  600 mg Oral BID    azithromycin  500 mg Oral Daily    cefTRIAXone (ROCEPHIN) IV  1 g Intravenous Q24H    nicotine  1 patch Transdermal Q24H    methylPREDNISolone  40 mg Intravenous Q12H    sodium chloride flush  10 mL Intravenous 2 times per day    enoxaparin  40 mg Subcutaneous Q24H    ipratropium-albuterol  1 ampule Inhalation Q4H WA    amLODIPine  5 mg Oral Daily    citalopram  20 mg Oral Daily    folic acid  1 mg Oral Daily    losartan  100 mg Oral Daily    metoprolol tartrate  25 mg Oral BID    pantoprazole  40 mg Oral QAM AC     Continuous Infusions:  PRN Meds:. ALPRAZolam, sodium chloride flush, acetaminophen **OR** acetaminophen, polyethylene glycol, promethazine **OR** ondansetron  Continuous Infusions:      Assessment   Active Problems:    HTN (hypertension)    COPD exacerbation (HCC)    Nicotine abuse    COPD (chronic obstructive pulmonary disease) (HCC)    Abnormal CT of the chest    Pneumonia of left upper lobe due to infectious organism [facility])  Resolved Problems:    * No resolved hospital problems.  *        Patient Active Problem List   Diagnosis    Nephrotic syndrome    HTN (hypertension)    Lower extremity edema    Membranous glomerulonephritis    Proteinuria    Anemia    Edema of both legs    Dyslipidemia    Hyponatremia    COPD exacerbation (HCC)    Nicotine abuse    COPD (chronic obstructive pulmonary disease) ([facility]    Abnormal CT of the chest    Pneumonia of left upper lobe due to infectious organism [facility])        Plan   Getting better  Cont treatment  Home tomorrow        Electronically signed by Joshua Saucedo MD on 7/15/2020 at 12:10 PM  Over 35 mins spent on this evaluation

## 2020-07-16 VITALS
RESPIRATION RATE: 16 BRPM | OXYGEN SATURATION: 95 % | HEIGHT: 65 IN | TEMPERATURE: 98.8 F | WEIGHT: 146.5 LBS | SYSTOLIC BLOOD PRESSURE: 144 MMHG | DIASTOLIC BLOOD PRESSURE: 78 MMHG | BODY MASS INDEX: 24.41 KG/M2 | HEART RATE: 83 BPM

## 2020-07-16 PROCEDURE — 87449 NOS EACH ORGANISM AG IA: CPT

## 2020-07-16 PROCEDURE — 87899 AGENT NOS ASSAY W/OPTIC: CPT

## 2020-07-16 PROCEDURE — APPSS30 APP SPLIT SHARED TIME 16-30 MINUTES: Performed by: NURSE PRACTITIONER

## 2020-07-16 PROCEDURE — 97116 GAIT TRAINING THERAPY: CPT

## 2020-07-16 PROCEDURE — 94640 AIRWAY INHALATION TREATMENT: CPT

## 2020-07-16 PROCEDURE — 6370000000 HC RX 637 (ALT 250 FOR IP): Performed by: INTERNAL MEDICINE

## 2020-07-16 PROCEDURE — 94760 N-INVAS EAR/PLS OXIMETRY 1: CPT

## 2020-07-16 PROCEDURE — 2580000003 HC RX 258: Performed by: INTERNAL MEDICINE

## 2020-07-16 PROCEDURE — 6370000000 HC RX 637 (ALT 250 FOR IP): Performed by: NURSE PRACTITIONER

## 2020-07-16 PROCEDURE — 2700000000 HC OXYGEN THERAPY PER DAY

## 2020-07-16 PROCEDURE — 99232 SBSQ HOSP IP/OBS MODERATE 35: CPT | Performed by: INTERNAL MEDICINE

## 2020-07-16 PROCEDURE — 97110 THERAPEUTIC EXERCISES: CPT

## 2020-07-16 RX ORDER — CLONIDINE HYDROCHLORIDE 0.1 MG/1
0.1 TABLET ORAL ONCE
Status: COMPLETED | OUTPATIENT
Start: 2020-07-16 | End: 2020-07-16

## 2020-07-16 RX ORDER — UMECLIDINIUM 62.5 UG/1
1 AEROSOL, POWDER ORAL DAILY
Qty: 1 EACH | Refills: 3 | Status: SHIPPED | OUTPATIENT
Start: 2020-07-16 | End: 2020-10-23 | Stop reason: SDUPTHER

## 2020-07-16 RX ORDER — ALBUTEROL SULFATE 2.5 MG/3ML
2.5 SOLUTION RESPIRATORY (INHALATION) EVERY 6 HOURS PRN
Qty: 120 VIAL | Refills: 3 | Status: SHIPPED | OUTPATIENT
Start: 2020-07-16 | End: 2021-05-11

## 2020-07-16 RX ORDER — GUAIFENESIN 600 MG/1
600 TABLET, EXTENDED RELEASE ORAL 2 TIMES DAILY
Qty: 30 TABLET | Refills: 0 | Status: SHIPPED | OUTPATIENT
Start: 2020-07-16 | End: 2020-10-23

## 2020-07-16 RX ORDER — ALBUTEROL SULFATE 90 UG/1
2 AEROSOL, METERED RESPIRATORY (INHALATION) EVERY 6 HOURS PRN
Qty: 1 INHALER | Refills: 3 | Status: SHIPPED | OUTPATIENT
Start: 2020-07-16 | End: 2020-07-16 | Stop reason: SDUPTHER

## 2020-07-16 RX ORDER — NICOTINE 21 MG/24HR
1 PATCH, TRANSDERMAL 24 HOURS TRANSDERMAL EVERY 24 HOURS
Qty: 30 PATCH | Refills: 3 | Status: SHIPPED | OUTPATIENT
Start: 2020-07-16 | End: 2020-10-23

## 2020-07-16 RX ORDER — NICOTINE 21 MG/24HR
1 PATCH, TRANSDERMAL 24 HOURS TRANSDERMAL EVERY 24 HOURS
Qty: 30 PATCH | Refills: 3 | Status: SHIPPED | OUTPATIENT
Start: 2020-07-16 | End: 2020-07-16

## 2020-07-16 RX ORDER — AZITHROMYCIN 500 MG/1
500 TABLET, FILM COATED ORAL DAILY
Qty: 1 TABLET | Refills: 0 | Status: SHIPPED | OUTPATIENT
Start: 2020-07-16 | End: 2020-07-17

## 2020-07-16 RX ORDER — CEFDINIR 300 MG/1
300 CAPSULE ORAL EVERY 12 HOURS SCHEDULED
Status: DISCONTINUED | OUTPATIENT
Start: 2020-07-16 | End: 2020-07-16 | Stop reason: HOSPADM

## 2020-07-16 RX ORDER — UMECLIDINIUM 62.5 UG/1
1 AEROSOL, POWDER ORAL DAILY
Qty: 1 EACH | Refills: 3 | Status: SHIPPED | OUTPATIENT
Start: 2020-07-16 | End: 2020-07-16 | Stop reason: SDUPTHER

## 2020-07-16 RX ORDER — ALBUTEROL SULFATE 2.5 MG/3ML
2.5 SOLUTION RESPIRATORY (INHALATION) EVERY 6 HOURS PRN
Qty: 120 VIAL | Refills: 3 | Status: SHIPPED | OUTPATIENT
Start: 2020-07-16 | End: 2020-07-16 | Stop reason: SDUPTHER

## 2020-07-16 RX ORDER — ALBUTEROL SULFATE 90 UG/1
2 AEROSOL, METERED RESPIRATORY (INHALATION) EVERY 6 HOURS PRN
Qty: 1 INHALER | Refills: 3 | Status: SHIPPED | OUTPATIENT
Start: 2020-07-16 | End: 2020-10-19 | Stop reason: SDUPTHER

## 2020-07-16 RX ORDER — CEFDINIR 300 MG/1
300 CAPSULE ORAL EVERY 12 HOURS SCHEDULED
Qty: 10 CAPSULE | Refills: 0 | Status: SHIPPED | OUTPATIENT
Start: 2020-07-16 | End: 2020-07-21

## 2020-07-16 RX ORDER — PREDNISONE 10 MG/1
TABLET ORAL
Qty: 26 TABLET | Refills: 0 | Status: SHIPPED | OUTPATIENT
Start: 2020-07-16 | End: 2020-07-26

## 2020-07-16 RX ADMIN — CITALOPRAM 20 MG: 20 TABLET, FILM COATED ORAL at 08:41

## 2020-07-16 RX ADMIN — IPRATROPIUM BROMIDE AND ALBUTEROL SULFATE 1 AMPULE: .5; 3 SOLUTION RESPIRATORY (INHALATION) at 01:55

## 2020-07-16 RX ADMIN — SODIUM CHLORIDE, PRESERVATIVE FREE 10 ML: 5 INJECTION INTRAVENOUS at 08:41

## 2020-07-16 RX ADMIN — CLONIDINE HYDROCHLORIDE 0.1 MG: 0.1 TABLET ORAL at 11:14

## 2020-07-16 RX ADMIN — AMLODIPINE BESYLATE 5 MG: 5 TABLET ORAL at 08:41

## 2020-07-16 RX ADMIN — PREDNISONE 40 MG: 20 TABLET ORAL at 08:41

## 2020-07-16 RX ADMIN — IPRATROPIUM BROMIDE AND ALBUTEROL SULFATE 1 AMPULE: .5; 3 SOLUTION RESPIRATORY (INHALATION) at 07:35

## 2020-07-16 RX ADMIN — GUAIFENESIN 600 MG: 600 TABLET, EXTENDED RELEASE ORAL at 08:41

## 2020-07-16 RX ADMIN — PANTOPRAZOLE SODIUM 40 MG: 40 TABLET, DELAYED RELEASE ORAL at 06:27

## 2020-07-16 RX ADMIN — LOSARTAN POTASSIUM 100 MG: 100 TABLET, FILM COATED ORAL at 08:41

## 2020-07-16 RX ADMIN — IPRATROPIUM BROMIDE AND ALBUTEROL SULFATE 1 AMPULE: .5; 3 SOLUTION RESPIRATORY (INHALATION) at 11:14

## 2020-07-16 RX ADMIN — FOLIC ACID 1 MG: 1 TABLET ORAL at 08:41

## 2020-07-16 RX ADMIN — ALPRAZOLAM 0.25 MG: 0.25 TABLET ORAL at 04:37

## 2020-07-16 RX ADMIN — METOPROLOL TARTRATE 25 MG: 25 TABLET ORAL at 08:41

## 2020-07-16 ASSESSMENT — PAIN SCALES - GENERAL
PAINLEVEL_OUTOF10: 0

## 2020-07-16 NOTE — PROGRESS NOTES
[facility]      Patient - Dom Sharp   MRN -  [id number]   [facility] # - [de-identified]   - 1945      Date of Admission -  2020  4:01 AM  Date of evaluation -  2020  Room - [facility] Day - [address] MD Primary Care Physician - AUSTIN Jaimes - CNP     Problem List      Active Hospital Problems    Diagnosis Date Noted    COPD (chronic obstructive pulmonary disease) ([facility] [J44.9] 2020    Abnormal chest x-ray [R93.89]     Pneumonia of left upper lobe due to infectious organism ([facility] [J18.1]     Nicotine abuse [Z72.0] 2020    COPD exacerbation ([facility] [J44.1] 2020    HTN (hypertension) [I10]      Reason for Consult    For management of COPD exacerbation  HPI   History Obtained From: Patient and electronic medical record. Dom Sharp is a 67 y.o. female  was initially admitted under Dr. Chelita Garza service. Pulmonary medicine was consulted for further management of COPD/exacerbation. The patient is a 67 y.o. female with past medical hx of tobacco smoking for 56 years with 2PPD, still smoking 4 to 5 cigarettes per day. She was diagnosed with COPD in the past. She used to follow with Dr. Janet Rosado in the past, none currently. She used to be on Advair diskus 250/50, Incruse Ellipta along with Spiriva Handihaler at home. She never used to be on home O2 in the past. She presented with worsening of shortness of breath for the last 3 days. Her current illness started as upper respiratory tract infection with cough, cold and sputum  production which is white in color. She noticed worsening of shortness of breath with decline in functional status. For the last 2 days she not able to attend her regular activities at home. She start using her rescue inhalers/nebulizations more frequently at home with no significant improvement in her shortness of breath or cough.  Due to Not on file     Minutes per session: Not on file    Stress: Not on file   Relationships    Social connections     Talks on phone: Not on file     Gets together: Not on file     Attends Baptism service: Not on file     Active member of club or organization: Not on file     Attends meetings of clubs or organizations: Not on file     Relationship status: Not on file    Intimate partner violence     Fear of current or ex partner: Not on file     Emotionally abused: Not on file     Physically abused: Not on file     Forced sexual activity: Not on file   Other Topics Concern    Not on file   Social History Narrative    Not on file       Vitals     height is 5' 5\" (1.651 m) and weight is 146 lb 8 oz (66.5 kg). Her oral temperature is 98.1 °F (36.7 °C). Her blood pressure is 173/87 (abnormal) and her pulse is 82. Her respiration is 18 and oxygen saturation is 95%. Body mass index is 24.38 kg/m². SUPPLEMENTAL O2: O2 Flow Rate (L/min): 1 L/min     I/O        Intake/Output Summary (Last 24 hours) at 7/16/2020 1012  Last data filed at 7/16/2020 0841  Gross per 24 hour   Intake 535 ml   Output 1200 ml   Net -665 ml     I/O last 3 completed shifts: In: 525 [P.O.:525]  Out: 1200 [Urine:1200]   Patient Vitals for the past 96 hrs (Last 3 readings):   Weight   07/15/20 0428 146 lb 8 oz (66.5 kg)   07/14/20 0215 139 lb 9.6 oz (63.3 kg)   07/13/20 0403 140 lb 3.2 oz (63.6 kg)       Exam   Physical Exam   Constitutional: No distress on O2 per NC. Patient appears chronically ill and thinly built. Head: Normocephalic and atraumatic. Mouth/Throat: Oropharynx is clear and moist.  No oral thrush. Eyes: Conjunctivae are normal. Pupils are equal, round. No scleral icterus. Neck: Neck supple. No tracheal deviation present. Cardiovascular: S1 and S2 with no murmur. No peripheral edema  Pulmonary/Chest: Normal effort with bilateral air entry, no added BS. No stridor. No respiratory distress. Patient exhibits no tenderness. Abdominal: Soft. Bowel sounds audible. No distension or tenderness to palp. Musculoskeletal: Moves all extremities  Neurological: Patient is alert and follow simple commands    Labs  - Old records and notes have been reviewed in [facility]   ABG  Lab Results   Component Value Date    PH 7.36 07/12/2020    PO2 67 07/12/2020    PCO2 45 07/12/2020    HCO3 26 07/12/2020    O2SAT 92 07/12/2020     Lab Results   Component Value Date    IFIO2 3 07/12/2020     CBC  No results for input(s): WBC, RBC, HGB, HCT, MCV, MCH, MCHC, RDW, PLT, MPV in the last 72 hours. BMP  No results for input(s): NA, K, CL, CO2, BUN, CREATININE, GLUCOSE, MG, PHOS, CALCIUM, IONCA, MG in the last 72 hours. LFT  No results for input(s): AST, ALT, ALB, BILITOT, ALKPHOS, LIPASE in the last 72 hours. Invalid input(s): AMYLASE  TROP  No results found for: TROPONINT  BNP  No results for input(s): BNP in the last 72 hours. Lactic Acid  No results for input(s): LACTA in the last 72 hours. INR  No results for input(s): INR, PROTIME in the last 72 hours. PTT  No results for input(s): APTT in the last 72 hours. Glucose  No results for input(s): POCGLU in the last 72 hours. UA No results for input(s): SPECGRAV, PHUR, COLORU, CLARITYU, MUCUS, PROTEINU, BLOODU, RBCUA, WBCUA, BACTERIA, NITRU, GLUCOSEU, BILIRUBINUR, UROBILINOGEN, KETUA, LABCAST, LABCASTTY, AMORPHOS in the last 72 hours. Invalid input(s): CRYSTALS. Bed side spirometry:   Date performed: 7/14/2020  FEV1: Measurement: 0.59L, 28 % Predicted. FEV1/FVC ratio : 52  FEF 25-75%:Measurement: 0.23L, 13 % Predicted. PFTs   None in [facility]    Sleep studies   None in Epic    Cultures    None in Epic  -COVID-19 test ( SARS-CoV-2) is Negative/None detected on: 7/12/2020      EKG     Echocardiogram   None in Epic    Radiology    CXR  Jul 13, 2020   PROCEDURE: XR CHEST (2 VW)   No acute cardiopulmonary disease. Stable appearance of the chest compared to the prior study as detailed above. CT Scans  (See actual reports for details)    CT CHEST W CONTRAST    7/14/2020   Impression:  1. Mild focal area of groundglass opacity along the medial left upper lobe on axial image 25 is noted. This may represent mild focal scarring versus mild inflammatory or infectious pneumonitis. Recommend 3 month follow-up CT imaging to document stability  or resolution. 2. Mild parenchymal opacity is demonstrated at the right lung base abutting the pleura with mild nodular thickening on axial image 56. This may represent focal scarring. However, this can be evaluated on subsequent follow-up imaging to document stability. 3. Nonspecific mild perinephric stranding is seen. However no hydronephrosis is seen associated with this finding. CT chest done in 1/4/2008:  No official report is available in [address] [place] [address] Rd. Home O2 eval by Jesenia Hayes RCP  7/16/2020    A home oxygen evaluation has been completed. [x]? Patient is an inpatient. It is expected that the patient will be discharged within the next 48 hours. Qualified provider to write order for home prescription if patient qualifies. Social service/care managers will arrange for home oxygen. If patient is active, arrange for Home Medical supplier to assess for Oxygen Conserving Device per pulse oximetry. []? Patient is an outpatient. Results will be faxed to the ordering provider. Qualified provider to write order for home prescription if patient qualifies and arranges for home oxygen. Patient was placed on room air for 5 minutes. SpO2 was 88 % on room air at rest. Patients SpO2 was below 89% and qualified for home oxygen. Oxygen was applied at 1 lpm via nasal cannula to maintain a SpO2 between 90-92% while at rest. Actual SpO2 was 91 %. Patient can ambulate for exercise flow rate. Patients was ambulated, SpO2 was 92% on 3 lpm to maintain SpO2 between 90-92% while exercising.         Note: For any SpO2 at 21% see policy and procedure for possible Gal Infante educated about my impression and plan. She verbalizes understanding. Questions and concerns addressed. Electronically signed by   AUSTIN Bashir CNP on 7/16/2020 at 10:12 AM       Patient was evaluated today for the diagnosis of COPD. I entered a DME order for home oxygen because the diagnosis and testing requires the patient to have supplemental oxygen. Condition will improve or be benefited by oxygen use. The patient is  able to perform good mobility in a home setting and therefore does require the use of a portable oxygen system. The need for this equipment was discussed with the patient and she understands and is in agreement. Electronically signed by AUSTIN Bashir CNP on 7/16/2020 at 11:40 AM     Addendum by Dr. Isaiah Briscoe MD:  I have seen and examined the patient independently. Face to face evaluation and examination was performed. The above evaluation and note has been reviewed. Labs and radiographs were reviewed. I Have discussed with Mr. Tawny Simms CNP about this patient in detail. The above assessment and plan has been reviewed. Please see my modifications mentioned below. My modifications:  She is on 1LPM via nasal cannula. Improving shortness of breath. Home O2 eval done today:    A home oxygen evaluation has been completed. [x]? Patient is an inpatient. It is expected that the patient will be discharged within the next 48 hours. Qualified provider to write order for home prescription if patient qualifies. Social service/care managers will arrange for home oxygen. If patient is active, arrange for Home Medical supplier to assess for Oxygen Conserving Device per pulse oximetry. []? Patient is an outpatient. Results will be faxed to the ordering provider. Qualified provider to write order for home prescription if patient qualifies and arranges for home oxygen. Patient was placed on room air for 5 minutes.  SpO2 was 88 % on room air at rest. Patients SpO2 was below 89% and qualified for home oxygen. Oxygen was applied at 1 lpm via nasal cannula to maintain a SpO2 between 90-92% while at rest. Actual SpO2 was 91 %. Patient can ambulate for exercise flow rate. Patients was ambulated, SpO2 was 92% on 3 lpm to maintain SpO2 between 90-92% while exercising. Note: For any SpO2 at 74% see policy and procedure for possible qualifications. Pt needs 1LPM of home O2 at rest and 3LPM with exercise. Follow up as above.       Chrissie Varghese MD 7/16/2020 5:02 PM

## 2020-07-16 NOTE — PLAN OF CARE
Continue breathing treatments to help improve lung sounds.   Problem: Impaired respiratory status  Goal: Clear lung sounds  Description: Clear lung sounds  7/15/2020 2100 by Elizabeth Coleman RCP  Outcome: Ongoing

## 2020-07-16 NOTE — PROGRESS NOTES
A home oxygen evaluation has been completed. [x]Patient is an inpatient. It is expected that the patient will be discharged within the next 48 hours. Qualified provider to write order for home prescription if patient qualifies. Social service/care managers will arrange for home oxygen. If patient is active, arrange for Home Medical supplier to assess for Oxygen Conserving Device per pulse oximetry. []Patient is an outpatient. Results will be faxed to the ordering provider. Qualified provider to write order for home prescription if patient qualifies and arranges for home oxygen. Patient was placed on room air for 5 minutes. SpO2 was 88 % on room air at rest. Patients SpO2 was below 89% and qualified for home oxygen. Oxygen was applied at 1 lpm via nasal cannula to maintain a SpO2 between 90-92% while at rest. Actual SpO2 was 91 %. Patient can ambulate for exercise flow rate. Patients was ambulated, SpO2 was 92% on 3 lpm to maintain SpO2 between 90-92% while exercising. Note: For any SpO2 at 93% see policy and procedure for possible qualifications.

## 2020-07-16 NOTE — PLAN OF CARE
Problem: Impaired respiratory status  Goal: Clear lung sounds  Continue therapy to improve lung sounds.

## 2020-07-16 NOTE — CARE COORDINATION
7/16/20, 10:06 AM EDT    DISCHARGE ON [facility] day: 2  Location: [facility]22/022 Reason for admit: COPD exacerbation ([facility] [J44.1]  COPD (chronic obstructive pulmonary disease) ([facility] [J44.9]   Procedure: No  Treatment Plan of Care: Continues on Zithromax and Rocephin. Mucinex. Duoneb. Prednisone. O2 continues at 1L/NC. Sat 95%. I & E wheezes. PT/OT. Barriers to Discharge: No  PCP: AUSTIN Torres CNP  Readmission Risk Score: 14%  Patient Goals/Plan/Treatment Preferences: Plans home with [facility] and will need home O2 eval.     7/16/20, 10:21 AM EDT    Patient goals/plan/ treatment preferences discussed by  and . Patient goals/plan/ treatment preferences reviewed with patient/ family. Patient/ family verbalize understanding of discharge plan and are in agreement with goal/plan/treatment preferences. Understanding was demonstrated using the teach back method. AVS provided by RN at time of discharge, which includes all necessary medical information pertaining to the patients current course of illness, treatment, post-discharge goals of care, and treatment preferences. IMM Letter  IMM Letter given to Patient/Family/Significant other/Guardian/POA/by[de-identified] Felicia YAP Case Manager. IMM Letter date given[de-identified] 07/15/20  IMM Letter time given[de-identified] 9666       Potential discharge home today. New HH per [facility]. Nebulizer and O2. Pt prefers [facility] since [facility] does not deal with oxygen. She also has been ordered a nebulizer. Danish Sis in DME notified of this. [address] Updated Danish Sis in DME that pt now has discharge order in place and is ready for oxygen and nebulizer to be delivered. Oxygen tank delivered to pt room. Nebulizer will be delivered to pt home along with O2 concentrator.

## 2020-07-16 NOTE — PROGRESS NOTES
[address] [address]  INPATIENT PHYSICAL THERAPY  DAILY NOTE  [facility] 8B - 8B-22/022-A    Time In:   Time Out: 115  Timed Code Treatment Minutes: 23 Minutes  Minutes: 23          Date: 2020  Patient Name: Mariana Viera,  Gender:  female        MRN: [id number]  : 1945  (67 y.o.)     Referring Practitioner: Dr. Oralia Ravi  Diagnosis: COPD exacerbation  Additional Pertinent Hx: admit with above diagnosis     Prior Level of Function:  Lives With: Son(handicap)  Type of Home: House  Home Layout: Multi-level, Able to Live on Main level with bedroom/bathroom  Home Access: Ramped entrance  Home Equipment: (no AD used PTA)   Bathroom Shower/Tub: Walk-in shower  Bathroom Toilet: Handicap height  Bathroom Accessibility: Accessible    ADL Assistance: Independent  Homemaking Assistance: Independent  Ambulation Assistance: Independent  Transfer Assistance: Independent  Additional Comments: Pt stating she was indep at home completing all homemaking tasks. Pt stating her son is indep as well    Restrictions/Precautions:  Restrictions/Precautions: Fall Risk, General Precautions  Position Activity Restriction  Other position/activity restrictions: O2    SUBJECTIVE: RN approved session, reports pt just got done walking in halls with RT. Pt sitting EOB, requesting to use the bathroom, on 1L O2 at rest and 3L with amb. Agreeable to session, ending in [facility] chair with all needs met.     PAIN: 0/10: denies, some SOB with bathroom activities    OBJECTIVE:  Bed Mobility:  Not Tested    Transfers:  Sit to Stand: Supervision  Stand to Sit:Supervision    Ambulation:  Stand By Assistance, cues for O2 line management  Distance: 10ft, 15ft  Surface: Level Tile  Device:No Device  Gait Deviations:  Slow Sena, Decreased Step Length Bilaterally, Decreased Arm Swing, Decreased Trunk Rotation and Decreased Gait Speed    Balance:  Dynamic Standing Balance: Stand By Assistance    Exercise:  Patient was guided in 1 set(s) 15 reps of exercise to both lower extremities. Ankle pumps, Glut sets, Quad sets, Seated marches, Seated heel/toe raises and Long arc quads. Exercises were completed for increased independence with functional mobility. Functional Outcome Measures: Completed  AM-PAC Inpatient Mobility without Stair Climbing Raw Score : 19  AM-PAC Inpatient without Stair Climbing T-Scale Score : 56.04    ASSESSMENT:  Assessment: Patient progressing toward established goals. Activity Tolerance:  Patient tolerance of  treatment: good. Pt tolerated session fairly well, somewhat anxious about going home and being alone at night. Pt steady with amb and tfers however easily fatigued and SOB. Pt would benefit from cont skilled PT services to improve functional mobility and safety. Equipment Recommendations: Other: cont to assess pt needs  Discharge Recommendations:  Continue to assess pending progress, Patient would benefit from continued therapy after discharge, 24 hour supervision or assist, Home with Home health PT    Plan: Times per week: 5X GM  Times per day: Daily  Specific instructions for Next Treatment: therex and mobility    Patient Education  Patient Education: Plan of Care, Home Exercise Program, Gait, Verbal Exercise Instruction, Home Safety Education    Goals:  Patient goals : return home, breath better  Short term goals  Time Frame for Short term goals: by discharge  Short term goal 1: bed mobility with MOD I to get in/out of bed  Short term goal 2: transfer with MOD I to get in/out of chairs  Short term goal 3: amb >75'x1 without AD and MOD I to walk safely in home  Short term goal 4: negotiate 1 step without HR and MOD I to enter home safely  Long term goals  Time Frame for Long term goals : no LTGs set secondary to short ELOS    Following session, patient left in safe position with all fall risk precautions in place.

## 2020-07-17 ENCOUNTER — CARE COORDINATION (OUTPATIENT)
Dept: CASE MANAGEMENT | Age: 75
End: 2020-07-17

## 2020-07-17 LAB
LEGIONELLA PNEUMOPHILIA AG, URINE: NEGATIVE
STREP PNEUMO AG, UR: NEGATIVE

## 2020-07-17 NOTE — DISCHARGE SUMMARY
Dr. Lacey Herb Discharge Summary  7/17/2020  10:04 AM    Patient:  Mirlande Ricardo  YOB: 1945    MRN: [id number]   Acct: [de-identified]   [id number]   Primary Care Physician: AUSTIN Lux CNP    Admit date:  7/12/2020    Discharge date:  7/17/2020    Discharge Diagnoses:    Patient Active Problem List   Diagnosis    Nephrotic syndrome    HTN (hypertension)    Lower extremity edema    Membranous glomerulonephritis    Proteinuria    Anemia    Edema of both legs    Dyslipidemia    Hyponatremia    [facility] exacerbation [facility]    Nicotine abuse    COPD (chronic obstructive pulmonary disease) ([facility]    Abnormal CT of the chest    Pneumonia of left upper lobe due to infectious organism [facility])       Discharge Medications:       Chelle Julien, Neftali Light Medication Instructions EDB:[id number]    Printed on:07/17/20 1004   Medication Information                      ADVAIR DISKUS 250-50 MCG/DOSE AEPB  Inhale 1 puff into the lungs 2 times daily             albuterol (PROVENTIL) (2.5 MG/3ML) 0.083% nebulizer solution  Take 3 mLs by nebulization every 6 hours as needed for Wheezing or Shortness of Breath             albuterol sulfate HFA (PROAIR HFA) 108 (90 Base) MCG/ACT inhaler  Inhale 2 puffs into the lungs every 6 hours as needed for Wheezing or Shortness of Breath             amLODIPine (NORVASC) 5 MG tablet  Take 5 mg by mouth daily              azithromycin (ZITHROMAX) 500 MG tablet  Take 1 tablet by mouth daily for 1 day Take at 8 PM 7/16/2020             cefdinir (OMNICEF) 300 MG capsule  Take 1 capsule by mouth every 12 hours for 10 doses             citalopram (CELEXA) 20 MG tablet  Take 20 mg by mouth daily              famotidine (PEPCID) 40 MG tablet  Take 40 mg by mouth daily              folic acid (FOLVITE) 1 MG tablet  Take 1 mg by mouth daily.              guaiFENesin (MUCINEX) 600 MG extended release tablet  Take 1 tablet by mouth 2 times daily             LORazepam (ATIVAN) 2 MG tablet  Take 2 mg by mouth every 6 hours as needed for Anxiety. losartan (COZAAR) 100 MG tablet  Take 1 tablet by mouth daily             metoprolol (LOPRESSOR) 25 MG tablet  Take 25 mg by mouth 2 times daily              nicotine (NICODERM CQ) 14 MG/24HR  Place 1 patch onto the skin every 24 hours             omeprazole (PRILOSEC) 20 MG delayed release capsule  Take 20 mg by mouth Daily              predniSONE (DELTASONE) 10 MG tablet  Prednisone 10mg PO. To take 4tab for 2days, then 3tab for 3days, then 2tab for 3days, [id number] for 3days .              Umeclidinium Bromide (INCRUSE ELLIPTA) 62.5 MCG/INH AEPB  Inhale 1 puff into the lungs daily               Scheduled Meds: Scheduled Meds:  Continuous Infusions:  PRN Meds:.  Continuous Infusions:  No intake or output data in the 24 hours ending 07/17/20 1004    Diet:  No diet orders on file    Activity:  Up ad wesley (Patient can move independently)    Follow-up:   with Raylene Cheadle , APRN - CNP,       Consultants:      Procedures:      Diagnostic Test:    Objective:  Lab Results   Component Value Date    WBC 12.5 07/13/2020    RBC 3.62 07/13/2020    HGB 12.1 07/13/2020    HCT 37.2 07/13/2020    .8 07/13/2020    MCH 33.4 07/13/2020    MCHC 32.5 07/13/2020    RDW 13.5 05/15/2013     07/13/2020    MPV 8.8 07/13/2020     Lab Results   Component Value Date     07/12/2020    K 4.2 07/12/2020    CL 98 07/12/2020    CO2 23 07/12/2020    BUN 22 07/12/2020    CREATININE 0.7 07/12/2020    GLUCOSE 123 07/12/2020    GLUCOSE 93 05/18/2012    CALCIUM 8.6 07/12/2020     Lab Results   Component Value Date    CALCIUM 8.6 07/12/2020     No results found for: IONCA  Lab Results   Component Value Date    MG 2.3 11/21/2016     No results found for: PHOS  No results found for: BNP  Lab Results   Component Value Date    ALKPHOS 82 07/12/2020    ALT 26 07/12/2020    AST 35 07/12/2020    PROT 6.7 07/12/2020    BILITOT 0.2 07/12/2020 BILIDIR <0.2 07/12/2020    LABALBU 4.1 07/12/2020     No results found for: LACTA  No results found for: AMYLASE  No results found for: LIPASE  Lab Results   Component Value Date    CHOL 199 08/09/2016    TRIG 63 08/09/2016     08/09/2016    LDLCALC 83 08/09/2016     No results for input(s): POCGLU in the last 72 hours. No results for input(s): CKTOTAL, CKMB, TROPONINI in the last 72 hours. No results found for: LABA1C  Lab Results   Component Value Date    INR 0.89 07/12/2020     No results found for: PHART, PO2ART, RWM6EWG, QBN0STH, BEART      Hospital Course: , see dictation      Vitals: BP (!) 144/78   Pulse 83   Temp 98.8 °F (37.1 °C) (Oral)   Resp 16   Ht 5' 5\" (1.651 m)   Wt 146 lb 8 oz (66.5 kg)   SpO2 95%   BMI 24.38 kg/m²   Physical Exam:  General appearance - alert, well appearing, and in no distress  Eyes - pupils equal and reactive, extraocular eye movements intact  Neck - supple, no significant adenopathy  Chest - clear to auscultation, no wheezes, rales or rhonchi, symmetric air entry  Heart - normal rate, regular rhythm, normal S1, S2, no murmurs, rubs, clicks or gallops  Abdomen - soft, nontender, nondistended, no masses or organomegaly pos bs:   Neurological - alert, oriented, normal speech, no focal findings or movement disorder noted  Extremities - peripheral pulses normal, no pedal edema,       Disposition: home    Condition: Stable        Jonathan Brandon MD     Copy: Primary Care Physician: AUSTIN Lux - [facility]  Internal Medicine  Over 30 mins spent on this discharge.

## 2020-07-17 NOTE — CARE COORDINATION
First attempt to reach pt for COVID 19 initial risk call. COVID test NEG 7/12/20    Pt stated she is busy, getting hair done, wiill call CTN back later. Called [facility] to notify of discharge, staff reported the pt declined [facility] services.

## 2020-07-18 ENCOUNTER — CARE COORDINATION (OUTPATIENT)
Dept: CASE MANAGEMENT | Age: 75
End: 2020-07-18

## 2020-07-18 LAB
GRAM STAIN RESULT: NORMAL
RESPIRATORY CULTURE: NORMAL

## 2020-07-18 NOTE — CARE COORDINATION
Patient contacted regarding Elyssa Zapien. Discussed COVID-19 related testing which was available at this time. Test results were negative on 20. Patient informed of results, if available? Yes    Care Transition Nurse/ Ambulatory Care Manager contacted the patient by telephone to perform post discharge assessment. Verified name and  with patient as identifiers. Provided introduction to self, and explanation of the CTN/ACM role, and reason for call due to risk factors for infection and/or exposure to COVID-19. Symptoms reviewed with patient who verbalized the following symptoms: cough, shortness of breath, no new symptoms and no worsening symptoms. Pt stated she is wearing the O2 @ 1 L/M with rest & 3 L/M with exertion, is to wear 3-4 hr/day. Pt stated the O2 helps her SOB      Due to no new or worsening symptoms encounter was not routed to provider for escalation. Discussed follow-up appointments. If no appointment was previously scheduled, appointment scheduling offered: Yes  [facility] follow up appointment(s):   Future Appointments   Date Time Provider Janice Cruz   2020 10:20 AM Amos Vital MD GLA KIDNEY [facility] [place]   10/9/2020  1:40 PM STR CT IMAGING RM1  OP [facility] STRZ OUT EXP STR Radiolog   10/9/2020  2:00 PM STR PULMONARY FUNCTION ROOM 1 STRZ PFT [facility] [place] [facility] [place]   10/15/2020 11:00 AM AUSTIN Bartlett - CNP Pulm Med [facility] [place]     Non[facility] follow up appointment(s): Anastasia Lacey 20     Advance Care Planning:   Does patient have an Advance Directive:  decision maker updated. Patient has following risk factors of: COPD and HTN. CTN/ACM reviewed discharge instructions, medical action plan and red flags such as increased shortness of breath, increasing fever and signs of decompensation with patient who verbalized understanding. Discussed exposure protocols and quarantine with CDC Guidelines What to do if you are sick with coronavirus disease 2019.  Patient was given an opportunity

## 2020-07-24 ENCOUNTER — CARE COORDINATION (OUTPATIENT)
Dept: CASE MANAGEMENT | Age: 75
End: 2020-07-24

## 2020-07-24 NOTE — CARE COORDINATION
You Patient resolved from the Care Transitions episode on 7/1720  Discussed COVID-19 related testing which was available at this time. Test results were negative. Patient informed of results, if available? Yes    Patient/family has been provided the following resources and education related to COVID-19:                         Signs, symptoms and red flags related to COVID-19            [facility] exposure and quarantine guidelines            [facility] exposure contact - [phone]            Contact for their local Department of Health                 Patient currently reports that the following symptoms have improved:  fever, fatigue, pain or aching joints, cough, shortness of breath, confusion or unusual change in mental status, chills or shaking, sweating, fast heart rate, fast breathing, dizziness/lightheadedness, less urine output, cold, clammy, and pale skin, low body temperature and no new/worsening symptoms     No further outreach scheduled with this CTN/ACM. Episode of Care resolved. Patient has this CTN/ACM contact information if future needs arise. Patient states she is doing well. Denies any issues or concerns. Will f/u with PCP if any issues.

## 2020-08-28 DIAGNOSIS — J44.9 CHRONIC OBSTRUCTIVE PULMONARY DISEASE, UNSPECIFIED COPD TYPE (HCC): ICD-10-CM

## 2020-08-28 RX ORDER — ALBUTEROL SULFATE 2.5 MG/3ML
2.5 SOLUTION RESPIRATORY (INHALATION) EVERY 6 HOURS PRN
Qty: 120 VIAL | Refills: 3 | OUTPATIENT
Start: 2020-08-28 | End: 2021-08-28

## 2020-08-28 RX ORDER — ALBUTEROL SULFATE 90 UG/1
2 AEROSOL, METERED RESPIRATORY (INHALATION) EVERY 6 HOURS PRN
Qty: 1 INHALER | Refills: 3 | OUTPATIENT
Start: 2020-08-28 | End: 2021-08-28

## 2020-09-08 ENCOUNTER — HOSPITAL ENCOUNTER (OUTPATIENT)
Age: 75
Discharge: HOME | End: 2020-09-08
Payer: MEDICARE

## 2020-09-08 DIAGNOSIS — R80.1 PERSISTENT PROTEINURIA: ICD-10-CM

## 2020-09-08 DIAGNOSIS — I10 ESSENTIAL HYPERTENSION: ICD-10-CM

## 2020-09-08 LAB
ANION GAP SERPL CALCULATED.3IONS-SCNC: 10 MEQ/L (ref 8–16)
BUN BLDV-MCNC: 40 MG/DL (ref 7–22)
CALCIUM SERPL-MCNC: 9 MG/DL (ref 8.5–10.5)
CHLORIDE BLD-SCNC: 102 MEQ/L (ref 98–111)
CO2: 26 MEQ/L (ref 23–33)
CREAT SERPL-MCNC: 0.7 MG/DL (ref 0.4–1.2)
CREATININE, URINE: 67.3 MG/DL
GFR SERPL CREATININE-BSD FRML MDRD: 81 ML/MIN/1.73M2
GLUCOSE BLD-MCNC: 87 MG/DL (ref 70–108)
MICROALBUMIN UR-MCNC: 1.54 MG/DL
MICROALBUMIN/CREAT UR-RTO: 23 MG/G (ref 0–30)
POTASSIUM SERPL-SCNC: 4.7 MEQ/L (ref 3.5–5.2)
SODIUM BLD-SCNC: 138 MEQ/L (ref 135–145)

## 2020-09-08 PROCEDURE — 36415 COLL VENOUS BLD VENIPUNCTURE: CPT

## 2020-09-08 PROCEDURE — 80048 BASIC METABOLIC PNL TOTAL CA: CPT

## 2020-09-08 PROCEDURE — 82043 UR ALBUMIN QUANTITATIVE: CPT

## 2020-10-09 ENCOUNTER — HOSPITAL ENCOUNTER (OUTPATIENT)
Age: 75
Discharge: HOME | End: 2020-10-09
Payer: MEDICARE

## 2020-10-09 ENCOUNTER — HOSPITAL ENCOUNTER (OUTPATIENT)
Dept: CT IMAGING | Age: 75
Discharge: HOME | End: 2020-10-09
Payer: MEDICARE

## 2020-10-09 PROCEDURE — U0003 INFECTIOUS AGENT DETECTION BY NUCLEIC ACID (DNA OR RNA); SEVERE ACUTE RESPIRATORY SYNDROME CORONAVIRUS 2 (SARS-COV-2) (CORONAVIRUS DISEASE [COVID-19]), AMPLIFIED PROBE TECHNIQUE, MAKING USE OF HIGH THROUGHPUT TECHNOLOGIES AS DESCRIBED BY CMS-2020-01-R: HCPCS

## 2020-10-09 PROCEDURE — 71250 CT THORAX DX C-: CPT

## 2020-10-10 LAB — SARS-COV-2: NOT DETECTED

## 2020-10-15 ENCOUNTER — HOSPITAL ENCOUNTER (OUTPATIENT)
Dept: PULMONOLOGY | Age: 75
Discharge: HOME | End: 2020-10-15
Payer: MEDICARE

## 2020-10-15 ENCOUNTER — HOSPITAL ENCOUNTER (OUTPATIENT)
Dept: PULMONOLOGY | Age: 75
End: 2020-10-15
Payer: MEDICARE

## 2020-10-15 PROCEDURE — 94060 EVALUATION OF WHEEZING: CPT

## 2020-10-15 PROCEDURE — 94729 DIFFUSING CAPACITY: CPT

## 2020-10-15 PROCEDURE — 94726 PLETHYSMOGRAPHY LUNG VOLUMES: CPT

## 2020-10-19 RX ORDER — ALBUTEROL SULFATE 90 UG/1
2 AEROSOL, METERED RESPIRATORY (INHALATION) EVERY 6 HOURS PRN
Qty: 1 INHALER | Refills: 3 | Status: SHIPPED | OUTPATIENT
Start: 2020-10-19 | End: 2020-10-23 | Stop reason: SDUPTHER

## 2020-10-19 RX ORDER — ALBUTEROL SULFATE 90 UG/1
2 AEROSOL, METERED RESPIRATORY (INHALATION) EVERY 6 HOURS PRN
Qty: 1 INHALER | Refills: 3 | Status: CANCELLED | OUTPATIENT
Start: 2020-10-19 | End: 2021-10-19

## 2020-10-19 NOTE — TELEPHONE ENCOUNTER
Patient is requesting to have this script sent to [facility] pharmacy instead of the [facility] pharmacy.     Michela Bernal called requesting a refill on the following medications:  Requested Prescriptions     Pending Prescriptions Disp Refills    albuterol sulfate HFA (PROAIR HFA) 108 (90 Base) MCG/ACT inhaler 1 Inhaler 3     Sig: Inhale 2 puffs into the lungs every 6 hours as needed for Wheezing or Shortness of Breath     Pharmacy verified:  .[email or web address]  [facility] pharmacy in [place]    Date of last visit: pt was seen in the hospital  Date of next visit (if applicable): 72/12/7805    [phone]

## 2020-10-23 ENCOUNTER — OFFICE VISIT (OUTPATIENT)
Dept: PULMONOLOGY | Age: 75
End: 2020-10-23
Payer: MEDICARE

## 2020-10-23 VITALS
OXYGEN SATURATION: 95 % | BODY MASS INDEX: 25.06 KG/M2 | HEIGHT: 65 IN | TEMPERATURE: 97.7 F | WEIGHT: 150.4 LBS | HEART RATE: 68 BPM | DIASTOLIC BLOOD PRESSURE: 76 MMHG | SYSTOLIC BLOOD PRESSURE: 138 MMHG

## 2020-10-23 PROCEDURE — 3023F SPIROM DOC REV: CPT | Performed by: NURSE PRACTITIONER

## 2020-10-23 PROCEDURE — G8484 FLU IMMUNIZE NO ADMIN: HCPCS | Performed by: NURSE PRACTITIONER

## 2020-10-23 PROCEDURE — 1090F PRES/ABSN URINE INCON ASSESS: CPT | Performed by: NURSE PRACTITIONER

## 2020-10-23 PROCEDURE — G8926 SPIRO NO PERF OR DOC: HCPCS | Performed by: NURSE PRACTITIONER

## 2020-10-23 PROCEDURE — 1123F ACP DISCUSS/DSCN MKR DOCD: CPT | Performed by: NURSE PRACTITIONER

## 2020-10-23 PROCEDURE — 4004F PT TOBACCO SCREEN RCVD TLK: CPT | Performed by: NURSE PRACTITIONER

## 2020-10-23 PROCEDURE — 99214 OFFICE O/P EST MOD 30 MIN: CPT | Performed by: NURSE PRACTITIONER

## 2020-10-23 PROCEDURE — G8417 CALC BMI ABV UP PARAM F/U: HCPCS | Performed by: NURSE PRACTITIONER

## 2020-10-23 PROCEDURE — 94618 PULMONARY STRESS TESTING: CPT | Performed by: NURSE PRACTITIONER

## 2020-10-23 PROCEDURE — G8427 DOCREV CUR MEDS BY ELIG CLIN: HCPCS | Performed by: NURSE PRACTITIONER

## 2020-10-23 PROCEDURE — G8400 PT W/DXA NO RESULTS DOC: HCPCS | Performed by: NURSE PRACTITIONER

## 2020-10-23 PROCEDURE — 4040F PNEUMOC VAC/ADMIN/RCVD: CPT | Performed by: NURSE PRACTITIONER

## 2020-10-23 RX ORDER — ALBUTEROL SULFATE 90 UG/1
2 AEROSOL, METERED RESPIRATORY (INHALATION) EVERY 6 HOURS PRN
Qty: 1 INHALER | Refills: 11 | Status: SHIPPED | OUTPATIENT
Start: 2020-10-23 | End: 2020-10-29 | Stop reason: SDUPTHER

## 2020-10-23 RX ORDER — UMECLIDINIUM 62.5 UG/1
1 AEROSOL, POWDER ORAL DAILY
Qty: 1 EACH | Refills: 3 | Status: SHIPPED | OUTPATIENT
Start: 2020-10-23 | End: 2021-03-05 | Stop reason: SDUPTHER

## 2020-10-23 RX ORDER — LORATADINE 10 MG/1
10 TABLET ORAL DAILY
Qty: 30 TABLET | Refills: 3 | Status: SHIPPED | OUTPATIENT
Start: 2020-10-23 | End: 2021-03-05

## 2020-10-23 ASSESSMENT — ENCOUNTER SYMPTOMS
VOMITING: 0
NAUSEA: 0
STRIDOR: 0
DIARRHEA: 0
CHEST TIGHTNESS: 0
WHEEZING: 0
COUGH: 1
SPUTUM PRODUCTION: 1
HEMOPTYSIS: 0
SHORTNESS OF BREATH: 1

## 2020-10-23 ASSESSMENT — COPD QUESTIONNAIRES: COPD: 1

## 2020-10-23 NOTE — PROGRESS NOTES
[facility]    Patient: Avinash Bella, 67 y.o.   : 1945  10/23/2020    Pt of Dr. Brit Dean   Patient presents with   [address] from Hospital     3 month hospital follow up. PFT 10/15/20 CT Chest 10/9/20         COPD   She complains of cough, shortness of breath and sputum production. There is no hemoptysis or wheezing. This is a chronic problem. The current episode started more than 1 year ago. The problem occurs daily. The problem has been waxing and waning. The cough is productive of sputum (clear and frothy). Associated symptoms include dyspnea on exertion, nasal congestion and postnasal drip. Pertinent negatives include no chest pain or fever. Her symptoms are aggravated by change in weather and strenuous activity. Her symptoms are alleviated by beta-agonist and rest. She reports significant improvement on treatment. Risk factors for lung disease include smoking/tobacco exposure. Her past medical history is significant for COPD and emphysema. Tim Mason is here for follow up from most recent hospitalization treated for AECOPD. PMH significant for anemia, HTN, BLE edema, membranous glomerulonephritis, nephrotic syndrome. She had a Hx of fall on her right side of chest Dec 24 2007. She subsequently developed fracture of right side ribs leading to ? Hemothorax. [facility] . She was [facility] to [facility]. She underwent chest tube placement with subsequent removal. ? Fibrothorax noted residual scarring at bases of lungs on radiograph. Patient reports today overall breathing has been waxing and waning since discharge. Patient reports good compliance with inhaled therapy feels like she is unable to fully take doses some days. Patient using albuterol 1-2 times per day since discharge. Reports intermittent compliance with supplemental O2.  Uses mostly at night    Former smoker 55 years Quit since most recent hospitalization  On 1 LPM at rest and 3 LPM with exertion at discharge    Progress History:   Since last visit any new medical issues? No  New ER or hospital visits? No  Any new or changes in medicines? No  Using inhalers? Yes, advair and incruse, with albuterol  Are they helpful? Yes  Flu vaccine? Planning to get  Pneumonia vaccine? Unsure will follow-up with PCP  Past Medical hx   PMH:  Past Medical History:   Diagnosis Date    Anemia     Dyslipidemia     HLD (hyperlipidemia)     HTN (hypertension)     Lower extremity edema     Membranous glomerulonephritis     Nephrotic syndrome     Proteinuria      SURGICAL HISTORY:No past surgical history on file. SOCIAL HISTORY:  Social History     Tobacco Use    Smoking status: Former Smoker     Packs/day: 1.50     Years: 55.00     Pack years: 82.50     Types: Cigarettes     Start date:      Last attempt to quit: 2020     Years since quittin.2    Smokeless tobacco: Never Used    Tobacco comment: 1.5 for 30   Substance Use Topics    Alcohol use: Yes     Alcohol/week: 0.0 standard drinks     Comment: 2 beers/day    Drug use: Never     ALLERGIES:No Known Allergies  FAMILY HISTORY:No family history on file.   CURRENT MEDICATIONS:  Current Outpatient Medications   Medication Sig Dispense Refill    fluticasone-salmeterol (ADVAIR HFA) 230-21 MCG/ACT inhaler Inhale 2 puffs into the lungs 2 times daily 1 Inhaler 3    Umeclidinium Bromide (INCRUSE ELLIPTA) 62.5 MCG/INH AEPB Inhale 1 puff into the lungs daily 1 each 3    loratadine (CLARITIN) 10 MG tablet Take 1 tablet by mouth daily 30 tablet 3    albuterol sulfate HFA (PROAIR HFA) 108 (90 Base) MCG/ACT inhaler Inhale 2 puffs into the lungs every 6 hours as needed for Wheezing or Shortness of Breath 1 Inhaler 11    albuterol (PROVENTIL) (2.5 MG/3ML) 0.083% nebulizer solution Take 3 mLs by nebulization every 6 hours as needed for Wheezing or Shortness of Breath 120 vial 3    amLODIPine (NORVASC) 5 MG tablet Take 5 mg by mouth daily       citalopram (CELEXA) 20 MG tablet Take 20 mg by mouth daily       losartan (COZAAR) 100 MG tablet Take 1 tablet by mouth daily 30 tablet 3    metoprolol (LOPRESSOR) 25 MG tablet Take 25 mg by mouth 2 times daily       LORazepam (ATIVAN) 2 MG tablet Take 2 mg by mouth every 6 hours as needed for Anxiety.  famotidine (PEPCID) 40 MG tablet Take 40 mg by mouth daily       omeprazole (PRILOSEC) 20 MG delayed release capsule Take 20 mg by mouth Daily       folic acid (FOLVITE) 1 MG tablet Take 1 mg by mouth daily. No current facility-administered medications for this visit. Luis Angel OLVERA   Review of Systems   Constitutional: Negative for chills, fever and unexpected weight change. HENT: Positive for postnasal drip. Respiratory: Positive for cough, sputum production and shortness of breath. Negative for hemoptysis, chest tightness, wheezing and stridor. Cardiovascular: Positive for dyspnea on exertion. Negative for chest pain and leg swelling. Gastrointestinal: Negative for diarrhea, nausea and vomiting. Genitourinary: Negative for dysuria. Physical exam   /76 (Site: Left Upper Arm, Position: Sitting, Cuff Size: Medium Adult)   Pulse 68   Temp 97.7 °F (36.5 °C)   Ht 5' 5\" (1.651 m)   Wt 150 lb 6.4 oz (68.2 kg)   SpO2 95% Comment: on RA  BMI 25.03 kg/m²    Wt Readings from Last 3 Encounters:   10/23/20 150 lb 6.4 oz (68.2 kg)   07/15/20 146 lb 8 oz (66.5 kg)   09/18/19 143 lb (64.9 kg)       Physical Exam  Vitals signs and nursing note reviewed. Constitutional:       General: She is not in acute distress. Appearance: She is well-developed. HENT:      Head: Normocephalic and atraumatic. Neck:      Musculoskeletal: Neck supple. Trachea: No tracheal deviation. Cardiovascular:      Rate and Rhythm: Normal rate and regular rhythm. Heart sounds: Normal heart sounds. No murmur. Pulmonary:      Effort: Pulmonary effort is normal. No respiratory distress. Breath sounds: Normal breath sounds. No stridor. No wheezing or rales. Chest:      Chest wall: No tenderness. Abdominal:      General: Bowel sounds are normal. There is no distension. Palpations: Abdomen is soft. Skin:     General: Skin is warm and dry. Capillary Refill: Capillary refill takes less than 2 seconds. Neurological:      Mental Status: She is alert and oriented to person, place, and time. Psychiatric:         Behavior: Behavior normal.         Thought Content: Thought content normal.          Results   Lung Nodule Screening     [x] Qualifies    [] Does not qualify   [] Declined    [] Completed  Recent CT chest   The USPSTF recommends annual screening for lung cancer with low-dose computed tomography (LDCT) in adults aged 54 to [de-identified] years who have a 30 pack-year smoking history and currently smoke or have quit within the past 15 years. Screening should be discontinued once a person has not smoked for 15 years or develops a health problem that substantially limits life expectancy or the ability or willingness to have curative lung surgery. Date performed: 7/14/2020  FEV1: Measurement: 0.59L, 28 % Predicted. FEV1/FVC ratio : 52  FEF 25-75%:Measurement: 0.23L, 13 % Predicted. CXR  July 13, 2020  PROCEDURE: XR CHEST (2 VW)   No acute cardiopulmonary disease. Stable appearance of the chest compared to the prior study as detailed above.        CT CHEST W CONTRAST    7/14/2020   Impression:  1. Mild focal area of groundglass opacity along the medial left upper lobe on axial image 25 is noted. This may represent mild focal scarring versus mild inflammatory or infectious pneumonitis. Recommend 3 month follow-up CT imaging to document stability  or resolution.    2. Mild parenchymal opacity is demonstrated at the right lung base abutting the pleura with mild nodular thickening on axial image 56.  This may represent focal scarring. However, this can be evaluated on subsequent follow-up imaging to document stability.    3. Nonspecific mild perinephric stranding is seen. However no hydronephrosis is seen associated with this finding.        CT CHEST WO CONTRAST   10/9/2020   FINDINGS:  Upper abdomen: Mild perinephric stranding around the left kidney, unchanged from prior study, likely related to prior episodes of obstruction or infection. Mediastinum and geoffrey: Unremarkable. No hilar or mediastinal adenopathy. Bones: There are a few old healed rib fractures right side with mild deformity of the right hemithorax. Moderately increased thoracic kyphosis. Moderate vertebral body spondylosis scattered in the thoracic spine. Old mild compression fracture T12. Lungs: Lungs are fibroemphysematous in appearance with mild fibrotic stranding along both hemidiaphragms, unchanged from prior study. There is also mild atelectatic stranding in the lingula. The previously noted groundglass infiltrate along the medial aspect of the left upper lobe has totally resolved. Impression:  1. Mild subsegmental atelectasis in the lingula. Mild fibrotic stranding along both hemidiaphragms. Previous described focus of scarring in the right posterior costophrenic sulcus unchanged. 2. Previously noted groundglass infiltrate medial aspect left upper lobe has totally resolved. Home O2 eval by Alonso Awan SARAH  7/16/2020     A home oxygen evaluation has been completed.      [x]? ?Patient is an inpatient. It is expected that the patient will be discharged within the next 48 hours. Qualified provider to write order for home prescription if patient qualifies. Social service/care managers will arrange for home oxygen.  If patient is active, arrange for Home Medical supplier to assess for Oxygen Conserving Device per pulse oximetry.  []??Patient is an outpatient. Results will be faxed to the ordering provider.  Qualified provider to write order for home prescription if patient qualifies and arranges for home oxygen.        Patient was placed on room air for 5 minutes. SpO2 was 88 % on room air at rest. Patients SpO2 was below 89% and qualified for home oxygen. Oxygen was applied at 1 lpm via nasal cannula to maintain a SpO2 between 90-92% while at rest. Actual SpO2 was 91 %. Patient can ambulate for exercise flow rate. Patients was ambulated, SpO2 was 92% on 3 lpm to maintain SpO2 between 90-92% while exercising.        Note: For any SpO2 at 81% see policy and procedure for possible qualifications. Six Minute Walk Test  Chris Roper 1945    Six minute walk test done in my office today by my medical assistant. Caitlyn's oxygen saturation at rest on room air was 95%. Her oxygen saturation dropped to 87% on room air with exertion. The six minute walk test was repeated with oxygen supplementation. Oxygen supplimentation was started with 1 LPM via nasal cannula and titrated to 1 LPM via nasal cannula. At the end of the test Katerine Fernandez's oxygen saturation remained at 96% on 1 LPM with exertion. She is mobile in the home and requires oxygen as outlined above. Patient ambulated a total of 216 feet and tolerated the walk but stopped short due to fatigue admits she does not walk much at home. Resting heart rate was 78 and 83 upon completion of the walk. Nasal Oxygen order:  1 lpm to be used with:  Walking Yes Sleep No Continuous No    DME Medical Necessity Documentation    Crystal Harding was seen in the office on 10/23/2020 for the diagnosis COPD. I am prescribing oxygen because the diagnosis and testing requires the patient to have oxygen in the home.  her condition will improve or be benefited by oxygen use. The patient is able to perform good mobility in a home setting and therefore does require the use of a portable oxygen system. Assessment      Diagnosis Orders   1.  Stage 3 severe COPD by GOLD classification ([facility])  6 Minute Walk Test    fluticasone-salmeterol (ADVAIR HFA) 230-21 MCG/ACT inhaler    DME Order for Home Oxygen as OP   2. Chronic respiratory failure with hypoxia ([address]  DME Order for Home Oxygen as OP   3. Non-seasonal allergic rhinitis, unspecified trigger  loratadine (CLARITIN) 10 MG tablet         Plan   -Change advair to [facility] for lower NIF requirement and 230 dose 2 puff BID advised to rinse and spit   -Continue Incruse 1 puff Daily  -Discussed albuterol inhaler and nebulizer use. Reviewed signs and symptoms indicating need for use including Shortness of breath and wheezing. Discussed with patient the importance of using inhaler or nebulizer within the prescribed time frames. Patient verbalized understanding to use one or the other not both within prescribed time frame.  Patient also verbalized understanding that if they experience no relief after using albuterol and resting for 15 minutes they need to go to nearest ER or call 911.  -Loratadine 10 mg PO Daily for allergy symptoms  -6 MWT today-1 LPM with exertion only  -Advised to maintain pneumonia vaccine with PCP and to take flu vaccine this coming season.  -Advised patient to call office with any changes, questions, or concerns regarding respiratory status    Will see Tiffany Lopez back in: 3 months med check     Conception Antes CNP  10/23/2020

## 2020-10-28 ENCOUNTER — OFFICE VISIT (OUTPATIENT)
Dept: NEPHROLOGY | Age: 75
End: 2020-10-28
Payer: MEDICARE

## 2020-10-28 VITALS
OXYGEN SATURATION: 96 % | BODY MASS INDEX: 24.86 KG/M2 | WEIGHT: 149.4 LBS | SYSTOLIC BLOOD PRESSURE: 118 MMHG | DIASTOLIC BLOOD PRESSURE: 62 MMHG | HEART RATE: 82 BPM | TEMPERATURE: 97.3 F

## 2020-10-28 PROCEDURE — G8484 FLU IMMUNIZE NO ADMIN: HCPCS | Performed by: INTERNAL MEDICINE

## 2020-10-28 PROCEDURE — G8420 CALC BMI NORM PARAMETERS: HCPCS | Performed by: INTERNAL MEDICINE

## 2020-10-28 PROCEDURE — 1090F PRES/ABSN URINE INCON ASSESS: CPT | Performed by: INTERNAL MEDICINE

## 2020-10-28 PROCEDURE — 4040F PNEUMOC VAC/ADMIN/RCVD: CPT | Performed by: INTERNAL MEDICINE

## 2020-10-28 PROCEDURE — G8400 PT W/DXA NO RESULTS DOC: HCPCS | Performed by: INTERNAL MEDICINE

## 2020-10-28 PROCEDURE — 99213 OFFICE O/P EST LOW 20 MIN: CPT | Performed by: INTERNAL MEDICINE

## 2020-10-28 PROCEDURE — G8427 DOCREV CUR MEDS BY ELIG CLIN: HCPCS | Performed by: INTERNAL MEDICINE

## 2020-10-28 PROCEDURE — 1036F TOBACCO NON-USER: CPT | Performed by: INTERNAL MEDICINE

## 2020-10-28 PROCEDURE — 1123F ACP DISCUSS/DSCN MKR DOCD: CPT | Performed by: INTERNAL MEDICINE

## 2020-10-28 RX ORDER — METHYLPREDNISOLONE 4 MG/1
4 TABLET ORAL
COMMUNITY
End: 2021-10-27

## 2020-10-28 NOTE — PROGRESS NOTES
Renal Progress Note    Assessment and Plan:      Diagnosis Orders   1. Isolated proteinuria with minor glomerular abnormality     2. Membranous glomerulonephritis     3. Essential hypertension     4. CKD (chronic kidney disease), stage II       PLAN:  I discussed my thoughts with the patient. She understood. Serum creatinine is good at 0.7 g/dL with GFR of 81 mL/min. Urine microalbumin creatinine ratio was done by the lab instead of  urine protein creatinine ratio. Lab slip given to her to have  urine protein creatinine ratio done hopefully today. Medications reviewed. No changes. We will call her with the report of urine protein creatinine ratio  today when available. Return within 12 months otherwise with labs. Patient Active Problem List   Diagnosis    Nephrotic syndrome    HTN (hypertension)    Lower extremity edema    Membranous glomerulonephritis    Proteinuria    Anemia    Edema of both legs    Dyslipidemia    Hyponatremia    COPD exacerbation (HCC)    Nicotine abuse    COPD (chronic obstructive pulmonary disease) (HCC)    Abnormal CT of the chest    Pneumonia of left upper lobe due to infectious organism       Subjective:   Chief complaint:  Chief Complaint   Patient presents with    Proteinuria      HPI:This is a follow up visit for Mrs. Sade Cox who is here today for return appointment. .  I see her for chronic kidney disease and proteinuria. [place] She was last seen about 12 months ago. Urine protein was 0.33 g. Apparently the lab did urine microalbumin creatinine ratio instead of urine protein creatinine ratio. The urine microalbumin creatinine ratio was 23. Doing well with no complaint. No chest pain or shortness of breath. No fever or chills. No nausea vomiting. No lower extremity edema. No abdominal pain. No headaches. Unfortunately, her  passed away in February 2020 since last time I saw her. She is managing.   I gave her my Date     09/08/2020     (L) 07/12/2020     09/09/2019    K 4.7 09/08/2020    K 4.2 07/12/2020    K 4.1 09/09/2019     09/08/2020    CL 98 07/12/2020     09/09/2019    CO2 26 09/08/2020    CO2 23 07/12/2020    CO2 24 09/09/2019    BUN 40 (H) 09/08/2020    BUN 22 07/12/2020    BUN 22 09/09/2019    CREATININE 0.7 09/08/2020    CREATININE 0.7 07/12/2020    CREATININE 0.7 09/09/2019    GLUCOSE 87 09/08/2020    GLUCOSE 123 (H) 07/12/2020    GLUCOSE 112 (H) 09/09/2019      Hepatic:   Lab Results   Component Value Date    AST 35 07/12/2020    ALT 26 07/12/2020    BILITOT 0.2 (L) 07/12/2020    ALKPHOS 82 07/12/2020     BNP: No results found for: BNP  Lipids:   Lab Results   Component Value Date    CHOL 199 08/09/2016     08/09/2016     INR:   Lab Results   Component Value Date    INR 0.89 07/12/2020     URINE:   Lab Results   Component Value Date    PROTUR 30.8 09/09/2019     No results found for: NITRU, COLORU, PHUR, LABCAST, WBCUA, RBCUA, MUCUS, TRICHOMONAS, YEAST, BACTERIA, CLARITYU, SPECGRAV, LEUKOCYTESUR, UROBILINOGEN, BILIRUBINUR, BLOODU, GLUCOSEU, KETUA, AMORPHOUS   Microalbumen/Creatinine ratio:  No components found for: RUCREAT    Objective:   Vitals: /62 (Site: Left Upper Arm, Position: Sitting, Cuff Size: Medium Adult)   Pulse 82   Temp 97.3 °F (36.3 °C)   Wt 149 lb 6.4 oz (67.8 kg)   SpO2 96%   BMI 24.86 kg/m²      Constitutional:  Alert, awake, no apparent distress  Skin:normal with no rash or any lesions  HEENT:Pupils are reactive . Throat is clear. Oral mucosa is moist.  Neck:supple with no thyromegaly or bruit   Cardiovascular:  S1, S2 without murmur   Respiratory:  Clear to auscultation with no wheezes or rales  Abdomen: +bowel sound, soft, non tender and no bruit  Ext: No LE edema and left   Musculoskeletal:Intact  Neuro:Alert, awake and oriented with no obvious focal deficit.   Speech is normal.    Electronically signed by Neyda Quezada MD on 10/28/2020 at 12:40 PM

## 2020-10-29 ENCOUNTER — HOSPITAL ENCOUNTER (OUTPATIENT)
Age: 75
Setting detail: SPECIMEN
Discharge: HOME | End: 2020-10-29
Payer: MEDICARE

## 2020-10-29 DIAGNOSIS — N06.0 ISOLATED PROTEINURIA WITH MINOR GLOMERULAR ABNORMALITY: ICD-10-CM

## 2020-10-29 LAB
CREATININE URINE: 139.5 MG/DL
PROT/CREAT RATIO, UR: 0.15
PROTEIN, URINE: 20.8 MG/DL

## 2020-10-29 PROCEDURE — 84156 ASSAY OF PROTEIN URINE: CPT

## 2020-10-29 PROCEDURE — 82570 ASSAY OF URINE CREATININE: CPT

## 2020-10-30 ENCOUNTER — TELEPHONE (OUTPATIENT)
Dept: NEPHROLOGY | Age: 75
End: 2020-10-30

## 2020-10-30 NOTE — TELEPHONE ENCOUNTER
----- Message from Caden Gamboa MD sent at 10/30/2020  5:38 AM EDT -----  Urine protein is good and better than last year

## 2020-11-05 ENCOUNTER — TELEPHONE (OUTPATIENT)
Dept: NEPHROLOGY | Age: 75
End: 2020-11-05

## 2020-11-05 NOTE — TELEPHONE ENCOUNTER
Luz Marina Gaona called from Dr. Randy Porras office and they have patient taking bumex 1 mg on her list.      I didn't see it on her med list  Is she still currently supposed to take it? She told them she has been taking up until 2 weeks ago?

## 2021-02-22 DIAGNOSIS — J44.9 STAGE 3 SEVERE COPD BY GOLD CLASSIFICATION (HCC): ICD-10-CM

## 2021-03-05 ENCOUNTER — OFFICE VISIT (OUTPATIENT)
Dept: PULMONOLOGY | Age: 76
End: 2021-03-05
Payer: MEDICARE

## 2021-03-05 VITALS
SYSTOLIC BLOOD PRESSURE: 134 MMHG | TEMPERATURE: 98.1 F | DIASTOLIC BLOOD PRESSURE: 86 MMHG | HEART RATE: 84 BPM | HEIGHT: 65 IN | OXYGEN SATURATION: 97 % | WEIGHT: 155 LBS | BODY MASS INDEX: 25.83 KG/M2

## 2021-03-05 DIAGNOSIS — J30.89 NON-SEASONAL ALLERGIC RHINITIS, UNSPECIFIED TRIGGER: ICD-10-CM

## 2021-03-05 DIAGNOSIS — J44.9 STAGE 3 SEVERE COPD BY GOLD CLASSIFICATION (HCC): Primary | ICD-10-CM

## 2021-03-05 DIAGNOSIS — J96.11 CHRONIC RESPIRATORY FAILURE WITH HYPOXIA (HCC): ICD-10-CM

## 2021-03-05 DIAGNOSIS — Z87.891 PERSONAL HISTORY OF TOBACCO USE: ICD-10-CM

## 2021-03-05 PROCEDURE — 3023F SPIROM DOC REV: CPT | Performed by: NURSE PRACTITIONER

## 2021-03-05 PROCEDURE — 99214 OFFICE O/P EST MOD 30 MIN: CPT | Performed by: NURSE PRACTITIONER

## 2021-03-05 PROCEDURE — G8400 PT W/DXA NO RESULTS DOC: HCPCS | Performed by: NURSE PRACTITIONER

## 2021-03-05 PROCEDURE — G8417 CALC BMI ABV UP PARAM F/U: HCPCS | Performed by: NURSE PRACTITIONER

## 2021-03-05 PROCEDURE — G8427 DOCREV CUR MEDS BY ELIG CLIN: HCPCS | Performed by: NURSE PRACTITIONER

## 2021-03-05 PROCEDURE — G8926 SPIRO NO PERF OR DOC: HCPCS | Performed by: NURSE PRACTITIONER

## 2021-03-05 PROCEDURE — 1090F PRES/ABSN URINE INCON ASSESS: CPT | Performed by: NURSE PRACTITIONER

## 2021-03-05 PROCEDURE — 99406 BEHAV CHNG SMOKING 3-10 MIN: CPT | Performed by: NURSE PRACTITIONER

## 2021-03-05 PROCEDURE — G8482 FLU IMMUNIZE ORDER/ADMIN: HCPCS | Performed by: NURSE PRACTITIONER

## 2021-03-05 PROCEDURE — G0296 VISIT TO DETERM LDCT ELIG: HCPCS | Performed by: NURSE PRACTITIONER

## 2021-03-05 PROCEDURE — 1123F ACP DISCUSS/DSCN MKR DOCD: CPT | Performed by: NURSE PRACTITIONER

## 2021-03-05 PROCEDURE — 4004F PT TOBACCO SCREEN RCVD TLK: CPT | Performed by: NURSE PRACTITIONER

## 2021-03-05 PROCEDURE — 4040F PNEUMOC VAC/ADMIN/RCVD: CPT | Performed by: NURSE PRACTITIONER

## 2021-03-05 RX ORDER — LORATADINE 10 MG/1
10 TABLET ORAL DAILY
Qty: 30 TABLET | Refills: 11 | Status: SHIPPED | OUTPATIENT
Start: 2021-03-05

## 2021-03-05 RX ORDER — UMECLIDINIUM 62.5 UG/1
1 AEROSOL, POWDER ORAL DAILY
Qty: 1 EACH | Refills: 11 | Status: SHIPPED | OUTPATIENT
Start: 2021-03-05 | End: 2022-01-04 | Stop reason: SDUPTHER

## 2021-03-05 ASSESSMENT — ENCOUNTER SYMPTOMS
CHEST TIGHTNESS: 0
VOMITING: 0
HEMOPTYSIS: 0
SHORTNESS OF BREATH: 1
WHEEZING: 1
NAUSEA: 0
COUGH: 1
SPUTUM PRODUCTION: 1
DIARRHEA: 0
STRIDOR: 0

## 2021-03-05 ASSESSMENT — COPD QUESTIONNAIRES: COPD: 1

## 2021-03-05 NOTE — PROGRESS NOTES
[facility]    Patient: Josefina Schilling, 67 y.o.   : 1945  3/5/2021    Pt of Dr. Rosendo Barrios   Patient presents with    COPD     3 mo check        COPD  She complains of cough, shortness of breath, sputum production and wheezing. There is no hemoptysis. This is a chronic problem. The current episode started more than 1 year ago. The problem occurs daily. Progression since onset: improved since changing advair  The cough is productive of sputum. Associated symptoms include dyspnea on exertion and nasal congestion. Pertinent negatives include no chest pain or fever. Her symptoms are aggravated by change in weather and strenuous activity (Cold, ). Her symptoms are alleviated by beta-agonist and rest. She reports significant improvement on treatment. Risk factors for lung disease include smoking/tobacco exposure. Her past medical history is significant for COPD and emphysema. Ritesh Escobar is here for follow up for COPD. PMH significant for anemia, HTN, BLE edema, membranous glomerulonephritis, nephrotic syndrome. She had a Hx of fall on her right side of chest Dec 24 2007. She subsequently developed fracture of right side ribs leading to ? Hemothorax. She signed her self out from [facility] in . She was subsequently admitted to [facility]. She underwent chest tube placement with subsequent removal. ? Fibrothorax noted residual scarring at bases of lungs on radiograph. Overall patient reports respiratory symptoms have been slightly improved since last appointment with change from advair [facility] to [facility]. Patient reports good compliance with inhaled medications (incruse, advair 230). Patient using albuterol 10-12 times per day on average. Patient reports minimal physical limitation due to respiratory symptoms. Patient uses loratadine for non-seasonal allergies with good benefit. Former smoker 55 years 1.5 PPD smoking 3 cig per day  Last appointment 6 MWT 1 LPM with exertion had to stop early due to pain. Progress History:   Since last visit any new medical issues? No  New ER or hospital visits? No  Any new or changes in medicines? No  Using inhalers? Yes advair HFA  Are they helpful? Yes   Flu vaccine? UTD  Pneumonia vaccine? Last dose   Past Medical hx   PMH:  Past Medical History:   Diagnosis Date    Anemia     Dyslipidemia     HLD (hyperlipidemia)     HTN (hypertension)     Lower extremity edema     Membranous glomerulonephritis     Nephrotic syndrome     Proteinuria      SURGICAL HISTORY:No past surgical history on file. SOCIAL HISTORY:  Social History     Tobacco Use    Smoking status: Current Every Day Smoker     Packs/day: 1.50     Years: 55.00     Pack years: 82.50     Types: Cigarettes     Start date:      Last attempt to quit: 2020     Years since quittin.6    Smokeless tobacco: Never Used    Tobacco comment: 3 cigarettes a day   Substance Use Topics    Alcohol use: Yes     Alcohol/week: 0.0 standard drinks     Comment: 2 beers/day    Drug use: Never     ALLERGIES:No Known Allergies  FAMILY HISTORY:No family history on file.   CURRENT MEDICATIONS:  Current Outpatient Medications   Medication Sig Dispense Refill    Umeclidinium Bromide (INCRUSE ELLIPTA) 62.5 MCG/INH AEPB Inhale 1 puff into the lungs daily 1 each 11    loratadine (CLARITIN) 10 MG tablet Take 1 tablet by mouth daily 30 tablet 11    fluticasone-salmeterol (ADVAIR HFA) 230-21 MCG/ACT inhaler Inhale 2 puffs into the lungs 2 times daily 1 Inhaler 11    albuterol sulfate HFA (PROAIR HFA) 108 (90 Base) MCG/ACT inhaler Inhale 2 puffs into the lungs every 6 hours as needed for Wheezing or Shortness of Breath 1 Inhaler 11    methylPREDNISolone (MEDROL) 4 MG tablet Take 4 mg by mouth three times a week -Discussed albuterol inhaler and nebulizer use. Reviewed signs and symptoms indicating need for use including Shortness of breath and wheezing. Discussed with patient the importance of using inhaler or nebulizer within the prescribed time frames. Patient verbalized understanding to use one or the other not both within prescribed time frame. Patient also verbalized understanding that if they experience no relief after using albuterol and resting for 15 minutes they need to go to nearest ER or call 911.  -Keep using O2 1 LPM with exertion, encouraged compliance came to appt with no O2  -Discussed LDCT last CT in Oct. patient agreeable will send for LDCT and follow up in Oct 1 yr after previous CT   -Discussed with patient smoking cessation techniques including patches and gum patient reports has used in the past and did not help, reinforced to patient the importance of quitting smoking to prevent further damage to lung function patient verbalized understanding. (Approximately 8 mins)-Prefers will power  -Advised to maintain pneumonia vaccine with PCP and to take flu vaccine this coming season.  -Advised patient to call office with any changes, questions, or concerns regarding respiratory status    Will see Pete Mercado back in: 7 months with LDCT  Belinda Welch CNP  3/5/2021       Low Dose CT (LDCT) Lung Screening criteria met   Age 55-77   Pack year smoking >30   Still smoking or less than 15 year since quit   No sign or symptoms of lung cancer   > 11 months since last LDCT     Risks and benefits of lung cancer screening with LDCT scans discussed:    Significance of positive screen - False-positive LDCT results often occur. 95% of all positive results do not lead to a diagnosis of cancer. Usually further imaging can resolve most false-positive results; however, some patients may require invasive procedures. Over diagnosis risk - 10% to 12% of screen-detected lung cancer cases are over diagnosedthat is, the cancer would not have been detected in the patient's lifetime without the screening. Need for follow up screens annually to continue lung cancer screening effectiveness     Risks associated with radiation from annual LDCT- Radiation exposure is about the same as for a mammogram, which is about 1/3 of the annual background radiation exposure from everyday life. Starting screening at age 54 is not likely to increase cancer risk from radiation exposure. Patients with comorbidities resulting in life expectancy of < 10 years, or that would preclude treatment of an abnormality identified on CT, should not be screened due to lack of benefit.     To obtain maximal benefit from this screening, smoking cessation and long-term abstinence from smoking is critical

## 2021-07-22 DIAGNOSIS — J44.9 STAGE 3 SEVERE COPD BY GOLD CLASSIFICATION (HCC): ICD-10-CM

## 2021-07-22 RX ORDER — ALBUTEROL SULFATE 90 UG/1
2 AEROSOL, METERED RESPIRATORY (INHALATION) EVERY 6 HOURS PRN
Qty: 1 INHALER | Refills: 11 | Status: SHIPPED | OUTPATIENT
Start: 2021-07-22 | End: 2022-03-02

## 2021-07-22 NOTE — TELEPHONE ENCOUNTER
Ruth Mouton called requesting a refill on the following medications:  Requested Prescriptions     Pending Prescriptions Disp Refills    albuterol sulfate HFA (PROAIR HFA) 108 (90 Base) MCG/ACT inhaler 1 Inhaler 11     Sig: Inhale 2 puffs into the lungs every 6 hours as needed for Wheezing or Shortness of Breath     Pharmacy verified:  .[facility]ied[facility]    Date of last visit: 03/05/21  Date of next visit (if applicable): 53/85/0849

## 2021-10-12 ENCOUNTER — HOSPITAL ENCOUNTER (OUTPATIENT)
Age: 76
Discharge: HOME | End: 2021-10-12
Payer: MEDICARE

## 2021-10-12 ENCOUNTER — HOSPITAL ENCOUNTER (OUTPATIENT)
Dept: CT IMAGING | Age: 76
Discharge: HOME | End: 2021-10-12
Payer: MEDICARE

## 2021-10-12 DIAGNOSIS — N18.2 CKD (CHRONIC KIDNEY DISEASE), STAGE II: ICD-10-CM

## 2021-10-12 DIAGNOSIS — Z87.891 PERSONAL HISTORY OF TOBACCO USE: ICD-10-CM

## 2021-10-12 LAB
ANION GAP SERPL CALCULATED.3IONS-SCNC: 14 MEQ/L (ref 8–16)
BUN BLDV-MCNC: 17 MG/DL (ref 7–22)
CALCIUM SERPL-MCNC: 9.5 MG/DL (ref 8.5–10.5)
CHLORIDE BLD-SCNC: 99 MEQ/L (ref 98–111)
CO2: 26 MEQ/L (ref 23–33)
CREAT SERPL-MCNC: 0.6 MG/DL (ref 0.4–1.2)
CREATININE URINE: 146.7 MG/DL
GFR SERPL CREATININE-BSD FRML MDRD: > 90 ML/MIN/1.73M2
GLUCOSE BLD-MCNC: 114 MG/DL (ref 70–108)
POTASSIUM SERPL-SCNC: 4.6 MEQ/L (ref 3.5–5.2)
PROT/CREAT RATIO, UR: 0.23
PROTEIN, URINE: 34.3 MG/DL
SODIUM BLD-SCNC: 139 MEQ/L (ref 135–145)

## 2021-10-12 PROCEDURE — 71271 CT THORAX LUNG CANCER SCR C-: CPT

## 2021-10-12 PROCEDURE — 84156 ASSAY OF PROTEIN URINE: CPT

## 2021-10-12 PROCEDURE — 36415 COLL VENOUS BLD VENIPUNCTURE: CPT

## 2021-10-12 PROCEDURE — 82570 ASSAY OF URINE CREATININE: CPT

## 2021-10-12 PROCEDURE — 80048 BASIC METABOLIC PNL TOTAL CA: CPT

## 2021-10-27 ENCOUNTER — OFFICE VISIT (OUTPATIENT)
Dept: NEPHROLOGY | Age: 76
End: 2021-10-27
Payer: MEDICARE

## 2021-10-27 VITALS
SYSTOLIC BLOOD PRESSURE: 134 MMHG | WEIGHT: 145.6 LBS | DIASTOLIC BLOOD PRESSURE: 78 MMHG | BODY MASS INDEX: 24.23 KG/M2 | HEART RATE: 86 BPM | OXYGEN SATURATION: 94 %

## 2021-10-27 DIAGNOSIS — N06.0 ISOLATED PROTEINURIA WITH MINOR GLOMERULAR ABNORMALITY: Primary | ICD-10-CM

## 2021-10-27 DIAGNOSIS — I10 ESSENTIAL HYPERTENSION: ICD-10-CM

## 2021-10-27 DIAGNOSIS — N05.2 MEMBRANOUS GLOMERULONEPHRITIS: ICD-10-CM

## 2021-10-27 PROCEDURE — 1090F PRES/ABSN URINE INCON ASSESS: CPT | Performed by: INTERNAL MEDICINE

## 2021-10-27 PROCEDURE — G8484 FLU IMMUNIZE NO ADMIN: HCPCS | Performed by: INTERNAL MEDICINE

## 2021-10-27 PROCEDURE — 4004F PT TOBACCO SCREEN RCVD TLK: CPT | Performed by: INTERNAL MEDICINE

## 2021-10-27 PROCEDURE — 1123F ACP DISCUSS/DSCN MKR DOCD: CPT | Performed by: INTERNAL MEDICINE

## 2021-10-27 PROCEDURE — 99213 OFFICE O/P EST LOW 20 MIN: CPT | Performed by: INTERNAL MEDICINE

## 2021-10-27 PROCEDURE — G8420 CALC BMI NORM PARAMETERS: HCPCS | Performed by: INTERNAL MEDICINE

## 2021-10-27 PROCEDURE — G8427 DOCREV CUR MEDS BY ELIG CLIN: HCPCS | Performed by: INTERNAL MEDICINE

## 2021-10-27 PROCEDURE — 4040F PNEUMOC VAC/ADMIN/RCVD: CPT | Performed by: INTERNAL MEDICINE

## 2021-10-27 PROCEDURE — G8400 PT W/DXA NO RESULTS DOC: HCPCS | Performed by: INTERNAL MEDICINE

## 2021-10-27 NOTE — PROGRESS NOTES
Renal Progress Note    Assessment and Plan:      Diagnosis Orders   1. Isolated proteinuria with minor glomerular abnormality     2. Membranous glomerulonephritis     3. Essential hypertension       PLAN:  I discussed my thoughts with the patient. She understood. Lab results reviewed with her in epic  Serum creatinine is very good at 0.6 mg/dL. Random urine protein creatinine ratio is increased to 230 mg from 150 mg 12 months ago. Medications reviewed  No changes  Return visit in 12 months with labs. I gave her the option of  staying with her primary care provider and see me as needed but she declined that option. Patient Active Problem List   Diagnosis    Nephrotic syndrome    HTN (hypertension)    Lower extremity edema    Membranous glomerulonephritis    Proteinuria    Anemia    Edema of both legs    Dyslipidemia    Hyponatremia    COPD exacerbation (HCC)    Nicotine abuse    COPD (chronic obstructive pulmonary disease) (HCC)    Abnormal CT of the chest    Pneumonia of left upper lobe due to infectious organism           Subjective:   Chief complaint:No chief complaint on file. HPI:This is a follow up visit for Ms Truman Vargas here today for an appointment. . Sees her for proteinuria and Membraonous GN. Was last seen about 12 months ago. Doing well since then. No complaint. Denies any chest pain or shortness of breath. No nausea vomiting. No fever or chills. .  Denies any bubbles in the urine. Number worsening with mobilization  . ROS:  Pertinent positives stated above in HPI. All other systems were reviewed and were negative.   Medications:     Current Outpatient Medications   Medication Sig Dispense Refill    albuterol sulfate HFA (PROAIR HFA) 108 (90 Base) MCG/ACT inhaler Inhale 2 puffs into the lungs every 6 hours as needed for Wheezing or Shortness of Breath 1 Inhaler 11    albuterol (PROVENTIL) (2.5 MG/3ML) 0.083% nebulizer solution Take 3 mLs by nebulization every 6 hours as needed for Wheezing or Shortness of Breath 120 mL 11    Umeclidinium Bromide (INCRUSE ELLIPTA) 62.5 MCG/INH AEPB Inhale 1 puff into the lungs daily 1 each 11    loratadine (CLARITIN) 10 MG tablet Take 1 tablet by mouth daily 30 tablet 11    fluticasone-salmeterol (ADVAIR HFA) 230-21 MCG/ACT inhaler Inhale 2 puffs into the lungs 2 times daily 1 Inhaler 11    LORazepam (ATIVAN) 2 MG tablet Take 2 mg by mouth every 6 hours as needed for Anxiety.  amLODIPine (NORVASC) 5 MG tablet Take 5 mg by mouth daily       citalopram (CELEXA) 20 MG tablet Take 20 mg by mouth daily       losartan (COZAAR) 100 MG tablet Take 1 tablet by mouth daily 30 tablet 3    metoprolol (LOPRESSOR) 25 MG tablet Take 50 mg by mouth 2 times daily       folic acid (FOLVITE) 1 MG tablet Take 1 mg by mouth daily. No current facility-administered medications for this visit.        Lab Results:    CBC:   Lab Results   Component Value Date    WBC 12.5 (H) 07/13/2020    HGB 12.1 07/13/2020    HCT 37.2 07/13/2020    .8 (H) 07/13/2020     07/13/2020     BMP:    Lab Results   Component Value Date     10/12/2021     09/08/2020     (L) 07/12/2020    K 4.6 10/12/2021    K 4.7 09/08/2020    K 4.2 07/12/2020    CL 99 10/12/2021     09/08/2020    CL 98 07/12/2020    CO2 26 10/12/2021    CO2 26 09/08/2020    CO2 23 07/12/2020    BUN 17 10/12/2021    BUN 40 (H) 09/08/2020    BUN 22 07/12/2020    CREATININE 0.6 10/12/2021    CREATININE 0.7 09/08/2020    CREATININE 0.7 07/12/2020    GLUCOSE 114 (H) 10/12/2021    GLUCOSE 87 09/08/2020    GLUCOSE 123 (H) 07/12/2020      Hepatic:   Lab Results   Component Value Date    AST 35 07/12/2020    ALT 26 07/12/2020    BILITOT 0.2 (L) 07/12/2020    ALKPHOS 82 07/12/2020     BNP: No results found for: BNP  Lipids:   Lab Results   Component Value Date    CHOL 199 08/09/2016     08/09/2016     INR:   Lab Results   Component Value Date    INR 0.89 07/12/2020 URINE:   Lab Results   Component Value Date    PROTUR 34.3 10/12/2021     No results found for: NITRU, COLORU, PHUR, LABCAST, WBCUA, RBCUA, MUCUS, TRICHOMONAS, YEAST, BACTERIA, CLARITYU, SPECGRAV, LEUKOCYTESUR, UROBILINOGEN, BILIRUBINUR, BLOODU, GLUCOSEU, KETUA, AMORPHOUS   Microalbumen/Creatinine ratio:  No components found for: RUCREAT    Objective:   Vitals: /78 (Site: Left Upper Arm, Position: Sitting, Cuff Size: Large Adult)   Pulse 86   Wt 145 lb 9.6 oz (66 kg)   SpO2 94%   BMI 24.23 kg/m²      Constitutional:  Alert, awake, no apparent distress  Skin:normal with no rash or any lesions  HEENT:Pupils are reactive . Throat is clear. Oral mucosa is moist.  Neck:supple with no thyromegaly or bruit   Cardiovascular:  S1, S2 without murmur   Respiratory:  Clear to auscultation with no wheezes or rales  Abdomen: +bowel sound, soft, non tender and no bruit  Ext:No LE edema  Musculoskeletal:Intact  Neuro:Alert, awake and oriented with no obvious focal deficit. Speech is normal.    Electronically signed by Melissa Cordova MD on 10/27/2021 at 11:15 AM   **This report has been created using voice recognition software. It maycontain minor  errors which are inherent in voice recognition technology. **

## 2022-01-04 ENCOUNTER — OFFICE VISIT (OUTPATIENT)
Dept: PULMONOLOGY | Age: 77
End: 2022-01-04
Payer: MEDICARE

## 2022-01-04 VITALS
SYSTOLIC BLOOD PRESSURE: 132 MMHG | DIASTOLIC BLOOD PRESSURE: 84 MMHG | BODY MASS INDEX: 24.16 KG/M2 | HEIGHT: 65 IN | TEMPERATURE: 98.8 F | OXYGEN SATURATION: 97 % | WEIGHT: 145 LBS | HEART RATE: 110 BPM

## 2022-01-04 DIAGNOSIS — J44.9 STAGE 3 SEVERE COPD BY GOLD CLASSIFICATION (HCC): Primary | ICD-10-CM

## 2022-01-04 DIAGNOSIS — J96.11 CHRONIC RESPIRATORY FAILURE WITH HYPOXIA (HCC): ICD-10-CM

## 2022-01-04 DIAGNOSIS — Z87.891 PERSONAL HISTORY OF TOBACCO USE: ICD-10-CM

## 2022-01-04 PROCEDURE — 1090F PRES/ABSN URINE INCON ASSESS: CPT | Performed by: NURSE PRACTITIONER

## 2022-01-04 PROCEDURE — 1123F ACP DISCUSS/DSCN MKR DOCD: CPT | Performed by: NURSE PRACTITIONER

## 2022-01-04 PROCEDURE — 99406 BEHAV CHNG SMOKING 3-10 MIN: CPT | Performed by: NURSE PRACTITIONER

## 2022-01-04 PROCEDURE — G8484 FLU IMMUNIZE NO ADMIN: HCPCS | Performed by: NURSE PRACTITIONER

## 2022-01-04 PROCEDURE — G0296 VISIT TO DETERM LDCT ELIG: HCPCS | Performed by: NURSE PRACTITIONER

## 2022-01-04 PROCEDURE — 3023F SPIROM DOC REV: CPT | Performed by: NURSE PRACTITIONER

## 2022-01-04 PROCEDURE — 4040F PNEUMOC VAC/ADMIN/RCVD: CPT | Performed by: NURSE PRACTITIONER

## 2022-01-04 PROCEDURE — 4004F PT TOBACCO SCREEN RCVD TLK: CPT | Performed by: NURSE PRACTITIONER

## 2022-01-04 PROCEDURE — G8420 CALC BMI NORM PARAMETERS: HCPCS | Performed by: NURSE PRACTITIONER

## 2022-01-04 PROCEDURE — 99214 OFFICE O/P EST MOD 30 MIN: CPT | Performed by: NURSE PRACTITIONER

## 2022-01-04 PROCEDURE — G8427 DOCREV CUR MEDS BY ELIG CLIN: HCPCS | Performed by: NURSE PRACTITIONER

## 2022-01-04 PROCEDURE — G8400 PT W/DXA NO RESULTS DOC: HCPCS | Performed by: NURSE PRACTITIONER

## 2022-01-04 RX ORDER — UMECLIDINIUM 62.5 UG/1
1 AEROSOL, POWDER ORAL DAILY
Qty: 1 EACH | Refills: 11 | Status: SHIPPED | OUTPATIENT
Start: 2022-01-04

## 2022-01-04 ASSESSMENT — ENCOUNTER SYMPTOMS
WHEEZING: 0
CHEST TIGHTNESS: 0
VOMITING: 0
SHORTNESS OF BREATH: 1
DIARRHEA: 0
NAUSEA: 0
STRIDOR: 0
RHINORRHEA: 1
COUGH: 1

## 2022-01-04 ASSESSMENT — COPD QUESTIONNAIRES: COPD: 1

## 2022-01-04 NOTE — PROGRESS NOTES
[facility]    Patient: Grey Mirza, 67 y.o.   : 1945  2022    Pt of Dr. Gabrielle Costello   Patient presents with    Follow-up     7 month pulmonary follow up        COPD  She complains of cough and shortness of breath. There is no wheezing. This is a chronic problem. The current episode started more than 1 year ago. The problem occurs daily. The problem has been unchanged. The cough is productive of sputum. Associated symptoms include chest pain, nasal congestion, rhinorrhea and sneezing. Pertinent negatives include no fever. Her symptoms are aggravated by change in weather and strenuous activity. Her symptoms are alleviated by beta-agonist and rest. She reports significant improvement on treatment. Risk factors for lung disease include smoking/tobacco exposure. Her past medical history is significant for COPD. Chuy Shikha is here for follow up for COPD with LDCT. PMH significant for anemia, HTN, BLE edema, membranous glomerulonephritis, nephrotic syndrome. She had a Hx of fall on her right side of chest Dec 24 2007. She subsequently developed fracture of right side ribs leading to ? Hemothorax. She signed her self out from [facility] in . She was subsequently admitted to [facility]. She underwent chest tube placement with subsequent removal. ? Fibrothorax noted residual scarring at bases of lungs on radiograph. Overall patient reports respiratory symptoms have been stable since last appointment. Patient reports good compliance with inhaled medications (incruse and advair /21). Patient using albuterol 1-2 times per day on average. Patient reports some physical limitation due to respiratory symptoms. Presents today without supplemental O2. Former smoker 57 years 1.5 PPD smoking 4-5 cig per day  Last appointment 6 MWT 1 LPM with exertion had to stop early due to pain.     No pets at home    Progress History:   Since last visit any new medical issues? No  New ER or hospital visits? No  Any new or changes in medicines? No  Using inhalers? Yes incruse and advair  Are they helpful? Yes   Flu vaccine? Reports up to date  Pneumonia vaccine? Last dose 2014  Past Medical hx   PMH:  Past Medical History:   Diagnosis Date    Anemia     Dyslipidemia     HLD (hyperlipidemia)     HTN (hypertension)     Lower extremity edema     Membranous glomerulonephritis     Nephrotic syndrome     Proteinuria      SURGICAL HISTORY:No past surgical history on file. SOCIAL HISTORY:  Social History     Tobacco Use    Smoking status: Current Every Day Smoker     Packs/day: 1.50     Years: 57.00     Pack years: 85.50     Types: Cigarettes     Start date: 1965    Smokeless tobacco: Never Used    Tobacco comment: 3 cigarettes a day   Vaping Use    Vaping Use: Never used   Substance Use Topics    Alcohol use: Yes     Alcohol/week: 0.0 standard drinks     Comment: 2 beers/day    Drug use: Never     ALLERGIES:No Known Allergies  FAMILY HISTORY:No family history on file. CURRENT MEDICATIONS:  Current Outpatient Medications   Medication Sig Dispense Refill    Umeclidinium Bromide (INCRUSE ELLIPTA) 62.5 MCG/INH AEPB Inhale 1 puff into the lungs daily 1 each 11    fluticasone-salmeterol (ADVAIR HFA) 230-21 MCG/ACT inhaler Inhale 2 puffs into the lungs 2 times daily Rinse and spit after doses 1 each 11    albuterol sulfate HFA (PROAIR HFA) 108 (90 Base) MCG/ACT inhaler Inhale 2 puffs into the lungs every 6 hours as needed for Wheezing or Shortness of Breath 1 Inhaler 11    albuterol (PROVENTIL) (2.5 MG/3ML) 0.083% nebulizer solution Take 3 mLs by nebulization every 6 hours as needed for Wheezing or Shortness of Breath 120 mL 11    loratadine (CLARITIN) 10 MG tablet Take 1 tablet by mouth daily 30 tablet 11    LORazepam (ATIVAN) 2 MG tablet Take 2 mg by mouth every 6 hours as needed for Anxiety.       amLODIPine (NORVASC) 5 MG tablet Take 5 mg by mouth daily       citalopram (CELEXA) 20 MG tablet Take 20 mg by mouth daily       losartan (COZAAR) 100 MG tablet Take 1 tablet by mouth daily 30 tablet 3    metoprolol (LOPRESSOR) 25 MG tablet Take 50 mg by mouth 2 times daily       folic acid (FOLVITE) 1 MG tablet Take 1 mg by mouth daily. No current facility-administered medications for this visit. Rico OLVERA   Review of Systems   Constitutional: Negative for chills, fever and unexpected weight change. HENT: Positive for rhinorrhea and sneezing. Respiratory: Positive for cough and shortness of breath. Negative for chest tightness, wheezing and stridor. Cardiovascular: Positive for chest pain. Negative for leg swelling. Gastrointestinal: Negative for diarrhea, nausea and vomiting. Genitourinary: Negative for dysuria. Physical exam   /84 (Site: Left Lower Arm, Position: Sitting, Cuff Size: Medium Adult)   Pulse 110   Temp 98.8 °F (37.1 °C) (Oral)   Ht 5' 5\" (1.651 m)   Wt 145 lb (65.8 kg)   SpO2 97%   BMI 24.13 kg/m²    Wt Readings from Last 3 Encounters:   01/04/22 145 lb (65.8 kg)   10/27/21 145 lb 9.6 oz (66 kg)   03/05/21 155 lb (70.3 kg)       Physical Exam  Vitals and nursing note reviewed. Constitutional:       General: She is not in acute distress. Appearance: She is well-developed. HENT:      Head: Normocephalic and atraumatic. Neck:      Trachea: No tracheal deviation. Comments: Mild kyphosis  Cardiovascular:      Rate and Rhythm: Normal rate and regular rhythm. Heart sounds: Normal heart sounds. No murmur heard. Pulmonary:      Effort: Pulmonary effort is normal. No respiratory distress. Breath sounds: Normal breath sounds. No stridor. No wheezing or rales. Comments: Increased A/P diameter  Chest:      Chest wall: No tenderness. Abdominal:      General: Bowel sounds are normal. There is no distension. Palpations: Abdomen is soft.    Musculoskeletal: Cervical back: Neck supple. Skin:     General: Skin is warm and dry. Capillary Refill: Capillary refill takes less than 2 seconds. Neurological:      Mental Status: She is alert and oriented to person, place, and time. Psychiatric:         Behavior: Behavior normal.         Thought Content: Thought content normal.          Results   Lung Nodule Screening     [x] Qualifies    [] Does not qualify   [] Declined    [] Completed  Current smoker 4-5 cig per day   The USPSTF recommends annual screening for lung cancer with low-dose computed tomography (LDCT) in adults aged 48 to 80 years who have a 20 pack-year smoking history and currently smoke or have quit within the past 15 years. Screening should be discontinued once a person has not smoked for 15 years or develops a health problem that substantially limits life expectancy or the ability or willingness to have curative lung surgery. NONCONTRAST SCREENING CT CHEST   10/12/2021   FINDINGS: Heart/mediastinum: The heart size is normal. Coronary artery calcifications are observed. No pericardial effusion is identified. The aorta is not dilated. No mediastinal, hilar, or axillary lymphadenopathy is observed. Lungs: Mild scarring at the lung apices is unchanged. No focal consolidation, pleural effusion, or pneumothorax is identified. Bilateral lower lobe atelectasis is stable. Scarring in the lingula is unchanged. No pulmonary mass or nodules are identified. Layering secretions are noted in the dependent portion of the trachea. The central airways are patent. Upper abdomen: No acute findings are noted in the limited images of the upper abdomen accounting for low dose CT technique. Musculoskeletal : Chronic consecutive right-sided rib fractures with chronic right-sided chest wall deformity is stable. Multilevel degenerative disc disease in the thoracic spine is unchanged. Exaggeration of the normal thoracic kyphosis is stable.  The visualized skeletal structures appear intact. Diffuse osteopenia is observed. Impression:  1. No acute intrathoracic process. No new pulmonary mass or nodules. 2. Stable chronic findings are discussed. 3. LUNGRADS ASSESSMENT VALUE: 1,    Lung RADS: Category 1. RECOMMENDATION: Annual low-dose noncontrast CT thorax for lung cancer screening. The LUNG RADS RECOMMENDATIONS for monitoring lung nodules listed below (ACR- Lung-RADS Version 1.0 Assessment Categories Release date\" April 28, 2014)  LUNG RADS RECOMMENDATIONS;   1.  Normal, continue annual screening   2. Benign appearance or behavior, continue annual screening   3.  6 month CT recommended   4A.  3 month CT recommended; may consider PET/CT   4B. Additional diagnostics and/or tissue sampling recommended   4X. Additional diagnostics and/or tissue sampling recommended        Assessment      Diagnosis Orders   1. Stage 3 severe COPD by GOLD classification (HCC)  Umeclidinium Bromide (INCRUSE ELLIPTA) 62.5 MCG/INH AEPB   2. Personal history of tobacco use  OH VISIT TO DISCUSS LUNG CA SCREEN W LDCT    CT Lung Screen (Annual)   3. Chronic respiratory failure with hypoxia (HCC)           Plan   -Continue on Incruse   -continue on Advair 230-21 mcg per act advised to rinse and spit after doses  -LDCT reviewed will repeat in Oct 2022  -Discussed with patient smoking cessation techniques including patches and gum patient reports has used in the past and did not help, reinforced to patient the importance of quitting smoking to prevent further damage to lung function patient verbalized understanding.  (Approximately 7mins)-prefers will power  -Continue on 1 LPM with exertion 6 MWT next appointment  -Advised to maintain pneumonia vaccine with PCP and to take flu vaccine this coming season.  -Advised patient to call office with any changes, questions, or concerns regarding respiratory status    Will see Elisabeth Gardner in: 9 months with LDCT    Gumaro Laird CNP  1/6/2022 Low Dose CT (LDCT) Lung Screening criteria met:     Age 55-77(Medicare) or 50-80 ([facility])   Pack year smoking >30 (Medicare) or >20 ([facility])   Still smoking or less than 15 year since quit   No sign or symptoms of lung cancer   > 11 months since last LDCT     Risks and benefits of lung cancer screening with LDCT scans discussed:    Significance of positive screen - False-positive LDCT results often occur. 95% of all positive results do not lead to a diagnosis of cancer. Usually further imaging can resolve most false-positive results; however, some patients may require invasive procedures. Over diagnosis risk - 10% to 12% of screen-detected lung cancer cases are over diagnosed--that is, the cancer would not have been detected in the patient's lifetime without the screening. Need for follow up screens annually to continue lung cancer screening effectiveness     Risks associated with radiation from annual LDCT- Radiation exposure is about the same as for a mammogram, which is about 1/3 of the annual background radiation exposure from everyday life. Starting screening at age 54 is not likely to increase cancer risk from radiation exposure. Patients with comorbidities resulting in life expectancy of < 10 years, or that would preclude treatment of an abnormality identified on CT, should not be screened due to lack of benefit.     To obtain maximal benefit from this screening, smoking cessation and long-term abstinence from smoking is critical

## 2022-02-02 DIAGNOSIS — J44.9 CHRONIC OBSTRUCTIVE PULMONARY DISEASE, UNSPECIFIED COPD TYPE (HCC): ICD-10-CM

## 2022-02-02 DIAGNOSIS — J44.9 STAGE 3 SEVERE COPD BY GOLD CLASSIFICATION (HCC): ICD-10-CM

## 2022-02-02 NOTE — TELEPHONE ENCOUNTER
[facility] the [facility]  Requested Prescriptions     Pending Prescriptions Disp Refills    albuterol sulfate HFA (PROAIR HFA) 108 (90 Base) MCG/ACT inhaler       Sig: Inhale 2 puffs into the lungs every 6 hours as needed for Wheezing or Shortness of Breath    albuterol (PROVENTIL) (2.5 MG/3ML) 0.083% nebulizer solution 120 mL 11     Sig: Take 3 mLs by nebulization every 6 hours as needed for Wheezing or Shortness of Breath     Pharmacy verified:Maci Welsh, [facility]  . pv      Date of last visit: 1/4/2022  Date of next visit (if applicable): 95/4/3576

## 2022-02-03 RX ORDER — ALBUTEROL SULFATE 2.5 MG/3ML
2.5 SOLUTION RESPIRATORY (INHALATION) EVERY 6 HOURS PRN
Qty: 360 ML | Refills: 11 | Status: SHIPPED | OUTPATIENT
Start: 2022-02-03

## 2022-02-03 RX ORDER — ALBUTEROL SULFATE 90 UG/1
2 AEROSOL, METERED RESPIRATORY (INHALATION) EVERY 6 HOURS PRN
OUTPATIENT
Start: 2022-02-03 | End: 2023-02-03

## 2022-02-25 DIAGNOSIS — J44.9 STAGE 3 SEVERE COPD BY GOLD CLASSIFICATION (HCC): ICD-10-CM

## 2022-03-02 RX ORDER — ALBUTEROL SULFATE 90 UG/1
2 AEROSOL, METERED RESPIRATORY (INHALATION) EVERY 6 HOURS PRN
Qty: 1 EACH | Refills: 11 | Status: SHIPPED | OUTPATIENT
Start: 2022-03-02

## 2022-10-18 ENCOUNTER — HOSPITAL ENCOUNTER (OUTPATIENT)
Dept: CT IMAGING | Age: 77
Discharge: HOME | End: 2022-10-18
Payer: MEDICARE

## 2022-10-18 DIAGNOSIS — Z87.891 PERSONAL HISTORY OF TOBACCO USE: ICD-10-CM

## 2022-10-18 PROCEDURE — 71271 CT THORAX LUNG CANCER SCR C-: CPT

## 2022-10-20 ENCOUNTER — HOSPITAL ENCOUNTER (OUTPATIENT)
Age: 77
Discharge: HOME | End: 2022-10-20
Payer: MEDICARE

## 2022-10-20 DIAGNOSIS — I10 ESSENTIAL HYPERTENSION: ICD-10-CM

## 2022-10-20 DIAGNOSIS — N06.0 ISOLATED PROTEINURIA WITH MINOR GLOMERULAR ABNORMALITY: ICD-10-CM

## 2022-10-20 LAB
CREATININE URINE: 80.1 MG/DL
PROT/CREAT RATIO, UR: 0.19
PROTEIN, URINE: 15.3 MG/DL

## 2022-10-20 PROCEDURE — 82570 ASSAY OF URINE CREATININE: CPT

## 2022-10-20 PROCEDURE — 84156 ASSAY OF PROTEIN URINE: CPT

## 2022-10-20 PROCEDURE — 36415 COLL VENOUS BLD VENIPUNCTURE: CPT

## 2022-10-20 PROCEDURE — 80048 BASIC METABOLIC PNL TOTAL CA: CPT

## 2022-10-21 LAB
ANION GAP SERPL CALCULATED.3IONS-SCNC: 10 MEQ/L (ref 8–16)
BUN BLDV-MCNC: 24 MG/DL (ref 7–22)
CALCIUM SERPL-MCNC: 8.9 MG/DL (ref 8.5–10.5)
CHLORIDE BLD-SCNC: 101 MEQ/L (ref 98–111)
CO2: 29 MEQ/L (ref 23–33)
CREAT SERPL-MCNC: 0.8 MG/DL (ref 0.4–1.2)
GFR SERPL CREATININE-BSD FRML MDRD: > 60 ML/MIN/1.73M2
GLUCOSE BLD-MCNC: 88 MG/DL (ref 70–108)
POTASSIUM SERPL-SCNC: 4.3 MEQ/L (ref 3.5–5.2)
SODIUM BLD-SCNC: 140 MEQ/L (ref 135–145)

## 2022-10-26 ENCOUNTER — OFFICE VISIT (OUTPATIENT)
Dept: NEPHROLOGY | Age: 77
End: 2022-10-26
Payer: MEDICARE

## 2022-10-26 VITALS
WEIGHT: 144 LBS | HEART RATE: 90 BPM | BODY MASS INDEX: 23.96 KG/M2 | OXYGEN SATURATION: 100 % | DIASTOLIC BLOOD PRESSURE: 64 MMHG | SYSTOLIC BLOOD PRESSURE: 128 MMHG

## 2022-10-26 DIAGNOSIS — N18.2 CKD (CHRONIC KIDNEY DISEASE), STAGE II: Primary | ICD-10-CM

## 2022-10-26 DIAGNOSIS — R80.9 PROTEINURIA, UNSPECIFIED TYPE: ICD-10-CM

## 2022-10-26 PROCEDURE — G8427 DOCREV CUR MEDS BY ELIG CLIN: HCPCS | Performed by: INTERNAL MEDICINE

## 2022-10-26 PROCEDURE — 99213 OFFICE O/P EST LOW 20 MIN: CPT | Performed by: INTERNAL MEDICINE

## 2022-10-26 PROCEDURE — 1123F ACP DISCUSS/DSCN MKR DOCD: CPT | Performed by: INTERNAL MEDICINE

## 2022-10-26 PROCEDURE — G8484 FLU IMMUNIZE NO ADMIN: HCPCS | Performed by: INTERNAL MEDICINE

## 2022-10-26 PROCEDURE — 3078F DIAST BP <80 MM HG: CPT | Performed by: INTERNAL MEDICINE

## 2022-10-26 PROCEDURE — 1090F PRES/ABSN URINE INCON ASSESS: CPT | Performed by: INTERNAL MEDICINE

## 2022-10-26 PROCEDURE — 3074F SYST BP LT 130 MM HG: CPT | Performed by: INTERNAL MEDICINE

## 2022-10-26 PROCEDURE — G8400 PT W/DXA NO RESULTS DOC: HCPCS | Performed by: INTERNAL MEDICINE

## 2022-10-26 PROCEDURE — 4004F PT TOBACCO SCREEN RCVD TLK: CPT | Performed by: INTERNAL MEDICINE

## 2022-10-26 PROCEDURE — G8420 CALC BMI NORM PARAMETERS: HCPCS | Performed by: INTERNAL MEDICINE

## 2022-10-26 RX ORDER — SERTRALINE HYDROCHLORIDE 100 MG/1
100 TABLET, FILM COATED ORAL DAILY
COMMUNITY
Start: 2022-10-21

## 2022-10-26 RX ORDER — LOSARTAN POTASSIUM 25 MG/1
25 TABLET ORAL DAILY
COMMUNITY
Start: 2022-10-02

## 2022-10-26 NOTE — PROGRESS NOTES
Renal Progress Note    Assessment and Plan:      Diagnosis Orders   1. CKD (chronic kidney disease), stage II        2. Proteinuria, unspecified type                  PLAN:  Lab results reviewed with the patient. We went through the report together in epic. She understood  She had no questions  Urine protein is 190 mg  Serum creatinine is  0.8 mg/dL  Medications reviewed  No changes  I want to see her on as needed basis since t she is doing very well with no proteinuria and normal renal function. She however declined and wants to continue to come once a year. Patient Active Problem List   Diagnosis    Nephrotic syndrome    HTN (hypertension)    Lower extremity edema    Membranous glomerulonephritis    Proteinuria    Anemia    Edema of both legs    Dyslipidemia    Hyponatremia    COPD exacerbation (HCC)    Nicotine abuse    COPD (chronic obstructive pulmonary disease) (HCC)    Abnormal CT of the chest    Pneumonia of left upper lobe due to infectious organism           Subjective:   Chief complaint:  Chief Complaint   Patient presents with    Proteinuria      HPI:This is a follow up visit for Ms. Hannon Route is here today for return appointment. I see her for proteinuria and membranous ocular nephritis biopsy-proven. She was last seen about 12 months ago. Doing well with no complaint. No levels in the urine. No leg swellings. .  No chest pain. No shortness of breath. ROS:  Pertinent positives stated above in HPI. All other systems were reviewed and were negative.   Medications:     Current Outpatient Medications   Medication Sig Dispense Refill    sertraline (ZOLOFT) 100 MG tablet Take 100 mg by mouth daily      losartan (COZAAR) 25 MG tablet Take 25 mg by mouth daily      albuterol sulfate HFA (PROAIR HFA) 108 (90 Base) MCG/ACT inhaler Inhale 2 puffs into the lungs every 6 hours as needed for Wheezing or Shortness of Breath 1 each 11    albuterol (PROVENTIL) (2.5 MG/3ML) 0.083% nebulizer solution Take 3 mLs by nebulization every 6 hours as needed for Wheezing or Shortness of Breath 360 mL 11    Umeclidinium Bromide (INCRUSE ELLIPTA) 62.5 MCG/INH AEPB Inhale 1 puff into the lungs daily 1 each 11    fluticasone-salmeterol (ADVAIR HFA) 230-21 MCG/ACT inhaler Inhale 2 puffs into the lungs 2 times daily Rinse and spit after doses 1 each 11    loratadine (CLARITIN) 10 MG tablet Take 1 tablet by mouth daily 30 tablet 11    LORazepam (ATIVAN) 2 MG tablet Take 2 mg by mouth every 6 hours as needed for Anxiety. amLODIPine (NORVASC) 5 MG tablet Take 5 mg by mouth daily       citalopram (CELEXA) 20 MG tablet Take 20 mg by mouth daily       metoprolol (LOPRESSOR) 25 MG tablet Take 50 mg by mouth 2 times daily       folic acid (FOLVITE) 1 MG tablet Take 1 mg by mouth daily. No current facility-administered medications for this visit.        Lab Results:    CBC:   Lab Results   Component Value Date    WBC 12.5 (H) 07/13/2020    HGB 12.1 07/13/2020    HCT 37.2 07/13/2020    .8 (H) 07/13/2020     07/13/2020     BMP:    Lab Results   Component Value Date     10/20/2022     10/12/2021     09/08/2020    K 4.3 10/20/2022    K 4.6 10/12/2021    K 4.7 09/08/2020     10/20/2022    CL 99 10/12/2021     09/08/2020    CO2 29 10/20/2022    CO2 26 10/12/2021    CO2 26 09/08/2020    BUN 24 (H) 10/20/2022    BUN 17 10/12/2021    BUN 40 (H) 09/08/2020    CREATININE 0.8 10/20/2022    CREATININE 0.6 10/12/2021    CREATININE 0.7 09/08/2020    GLUCOSE 88 10/20/2022    GLUCOSE 114 (H) 10/12/2021    GLUCOSE 87 09/08/2020      Hepatic:   Lab Results   Component Value Date    AST 35 07/12/2020    ALT 26 07/12/2020    BILITOT 0.2 (L) 07/12/2020    ALKPHOS 82 07/12/2020     BNP: No results found for: BNP  Lipids:   Lab Results   Component Value Date    CHOL 199 08/09/2016     08/09/2016     INR:   Lab Results   Component Value Date    INR 0.89 07/12/2020     URINE:   Lab Results Component Value Date/Time    PROTUR 15.3 10/20/2022 02:43 PM     No results found for: NITRU, COLORU, PHUR, LABCAST, WBCUA, RBCUA, MUCUS, TRICHOMONAS, YEAST, BACTERIA, CLARITYU, SPECGRAV, LEUKOCYTESUR, UROBILINOGEN, BILIRUBINUR, BLOODU, GLUCOSEU, KETUA, AMORPHOUS   Microalbumen/Creatinine ratio:  No components found for: RUCREAT    Objective:   Vitals: /64 (Site: Left Upper Arm, Position: Sitting, Cuff Size: Medium Adult)   Pulse 90   Wt 144 lb (65.3 kg)   SpO2 100%   BMI 23.96 kg/m²      Constitutional:  Alert, awake, no apparent distress  Skin:normal with no rash or any significant lesions  HEENT:Pupils are reactive . Throat is clear. Oral mucosa is moist.  Neck:supple with no thyromegaly, JVD, lymphadenopathy or bruit   Cardiovascular: Regular sinus rhythm without murmur, rubs or gallops   Respiratory:  Clear to auscultation with no wheezes or rales  Abdomen: Good bowel sound, soft, non tender and no bruit  Ext: No LE edema  Musculoskeletal:Intact  Neuro:Alert, awake and oriented with no obvious focal deficit. Speech is normal.    Electronically signed by Raúl Llamas MD on 10/26/2022 at 11:24 AM   **This report has been created using voice recognition software. It maycontain minor  errors which are inherent in voice recognition technology. **    .   4. ]  Membranes doing ago was

## 2022-11-15 ENCOUNTER — OFFICE VISIT (OUTPATIENT)
Dept: PULMONOLOGY | Age: 77
End: 2022-11-15
Payer: MEDICARE

## 2022-11-15 VITALS
DIASTOLIC BLOOD PRESSURE: 60 MMHG | WEIGHT: 147.6 LBS | HEIGHT: 65 IN | SYSTOLIC BLOOD PRESSURE: 138 MMHG | OXYGEN SATURATION: 99 % | HEART RATE: 88 BPM | TEMPERATURE: 98.1 F | BODY MASS INDEX: 24.59 KG/M2

## 2022-11-15 DIAGNOSIS — J44.9 STAGE 3 SEVERE COPD BY GOLD CLASSIFICATION (HCC): Primary | ICD-10-CM

## 2022-11-15 DIAGNOSIS — Z87.891 PERSONAL HISTORY OF TOBACCO USE: ICD-10-CM

## 2022-11-15 PROCEDURE — G8484 FLU IMMUNIZE NO ADMIN: HCPCS | Performed by: NURSE PRACTITIONER

## 2022-11-15 PROCEDURE — 4004F PT TOBACCO SCREEN RCVD TLK: CPT | Performed by: NURSE PRACTITIONER

## 2022-11-15 PROCEDURE — G8400 PT W/DXA NO RESULTS DOC: HCPCS | Performed by: NURSE PRACTITIONER

## 2022-11-15 PROCEDURE — 1123F ACP DISCUSS/DSCN MKR DOCD: CPT | Performed by: NURSE PRACTITIONER

## 2022-11-15 PROCEDURE — G8427 DOCREV CUR MEDS BY ELIG CLIN: HCPCS | Performed by: NURSE PRACTITIONER

## 2022-11-15 PROCEDURE — 3023F SPIROM DOC REV: CPT | Performed by: NURSE PRACTITIONER

## 2022-11-15 PROCEDURE — 3074F SYST BP LT 130 MM HG: CPT | Performed by: NURSE PRACTITIONER

## 2022-11-15 PROCEDURE — G8420 CALC BMI NORM PARAMETERS: HCPCS | Performed by: NURSE PRACTITIONER

## 2022-11-15 PROCEDURE — 3078F DIAST BP <80 MM HG: CPT | Performed by: NURSE PRACTITIONER

## 2022-11-15 PROCEDURE — 1090F PRES/ABSN URINE INCON ASSESS: CPT | Performed by: NURSE PRACTITIONER

## 2022-11-15 PROCEDURE — 99213 OFFICE O/P EST LOW 20 MIN: CPT | Performed by: NURSE PRACTITIONER

## 2022-11-15 ASSESSMENT — ENCOUNTER SYMPTOMS
EYES NEGATIVE: 1
SHORTNESS OF BREATH: 1
ALLERGIC/IMMUNOLOGIC NEGATIVE: 1
GASTROINTESTINAL NEGATIVE: 1
COUGH: 0
WHEEZING: 0

## 2022-11-15 NOTE — PROGRESS NOTES
[facility]    Patient: Christiane Guadalupe, 65 y.o.   : 1945  11/15/2022    Pt of Dr. Melissa Sanderson   Patient presents with    Follow-up     COPD 9 month f/u with CT 10/18/22        HPI  Gavino Morin is here for follow up for her Severe COPD. SOB mainly with exertion and with change of weather  LDCT done recently no worrisome findings for malignancy   Home oxygen use 3LPM at night on 1LPM with activity     Patient is experiencing SOB: Yes not very often. Patient is experiencing wheezing: Yes a little bit. Patient states they have had a Weak, productive = 2 cough. Phlegm is occasional, color:  clear/cream white     Patient is coughing up blood: no     Patient has been experiencing chest pains: non-existent     Patient is currently taking the following inhaler(s): Incruse, Advair, Albuterol. Patient is currently taking the following nebulizer treatment(s): Albuterol     Patient is using their rescue inhaler 2-4 times per day. Patient is currently using 3 liters of oxygen during sleep. Progress History:   Since last visit any new medical issues? No  New ER or hospital visits? No  Any new or changes in medicines? No  Using inhalers? Yes   Are they helpful? Yes   Flu vaccine UTD  Pneumonia vaccine UTD  Covid vaccine- done x 2     Past Medical hx   PMH:  Past Medical History:   Diagnosis Date    Anemia     Dyslipidemia     HLD (hyperlipidemia)     HTN (hypertension)     Lower extremity edema     Membranous glomerulonephritis     Nephrotic syndrome     Proteinuria      SURGICAL HISTORY:History reviewed. No pertinent surgical history.   SOCIAL HISTORY:  Social History     Tobacco Use    Smoking status: Every Day     Packs/day: 1.50     Years: 57.00     Pack years: 85.50     Types: Cigarettes     Start date:     Smokeless tobacco: Never    Tobacco comments:     3 cigarettes a day   Vaping Use    Vaping Use: Never used Substance Use Topics    Alcohol use: Yes     Alcohol/week: 0.0 standard drinks     Comment: 2 beers/day    Drug use: Never     ALLERGIES:  Allergies   Allergen Reactions    Bee Venom Hives     FAMILY HISTORY:History reviewed. No pertinent family history. CURRENT MEDICATIONS:  Current Outpatient Medications   Medication Sig Dispense Refill    fluticasone-salmeterol (ADVAIR HFA) 230-21 MCG/ACT inhaler Inhale 2 puffs into the lungs 2 times daily Rinse and spit after doses 1 each 11    Umeclidinium Bromide (INCRUSE ELLIPTA) 62.5 MCG/INH AEPB Inhale 1 puff into the lungs daily 1 each 11    sertraline (ZOLOFT) 100 MG tablet Take 100 mg by mouth daily      losartan (COZAAR) 25 MG tablet Take 25 mg by mouth daily      albuterol sulfate HFA (PROAIR HFA) 108 (90 Base) MCG/ACT inhaler Inhale 2 puffs into the lungs every 6 hours as needed for Wheezing or Shortness of Breath 1 each 11    albuterol (PROVENTIL) (2.5 MG/3ML) 0.083% nebulizer solution Take 3 mLs by nebulization every 6 hours as needed for Wheezing or Shortness of Breath 360 mL 11    loratadine (CLARITIN) 10 MG tablet Take 1 tablet by mouth daily 30 tablet 11    LORazepam (ATIVAN) 2 MG tablet Take 2 mg by mouth every 6 hours as needed for Anxiety. amLODIPine (NORVASC) 5 MG tablet Take 5 mg by mouth daily       citalopram (CELEXA) 20 MG tablet Take 20 mg by mouth daily       metoprolol (LOPRESSOR) 25 MG tablet Take 50 mg by mouth 2 times daily       folic acid (FOLVITE) 1 MG tablet Take 1 mg by mouth daily. No current facility-administered medications for this visit. ROS   Review of Systems   Constitutional: Negative. Negative for chills and fever. HENT: Negative. Negative for congestion. Eyes: Negative. Respiratory:  Positive for shortness of breath. Negative for cough and wheezing. Cardiovascular: Negative. Negative for chest pain and leg swelling. Gastrointestinal: Negative. Endocrine: Negative. Genitourinary: Negative. Musculoskeletal: Negative. Allergic/Immunologic: Negative. Neurological: Negative. Hematological: Negative. Psychiatric/Behavioral: Negative. Negative for sleep disturbance. Physical exam   /60   Pulse 88   Temp 98.1 °F (36.7 °C)   Ht 5' 5\" (1.651 m)   Wt 147 lb 9.6 oz (67 kg)   SpO2 99%   BMI 24.56 kg/m²    Wt Readings from Last 3 Encounters:   11/15/22 147 lb 9.6 oz (67 kg)   10/26/22 144 lb (65.3 kg)   01/04/22 145 lb (65.8 kg)       Physical Exam  Vitals and nursing note reviewed. Constitutional:       Appearance: She is well-developed. HENT:      Head: Normocephalic and atraumatic. Eyes:      Conjunctiva/sclera: Conjunctivae normal.      Pupils: Pupils are equal, round, and reactive to light. Neck:      Vascular: No JVD. Cardiovascular:      Rate and Rhythm: Normal rate and regular rhythm. Heart sounds: Normal heart sounds. No murmur heard. No friction rub. No gallop. Pulmonary:      Effort: Pulmonary effort is normal. No respiratory distress. Breath sounds: Normal breath sounds. No wheezing or rales. Abdominal:      General: Bowel sounds are normal.      Palpations: Abdomen is soft. Musculoskeletal:         General: Normal range of motion. Cervical back: Normal range of motion and neck supple. Skin:     General: Skin is warm and dry. Capillary Refill: Capillary refill takes less than 2 seconds. Neurological:      Mental Status: She is alert and oriented to person, place, and time. Psychiatric:         Behavior: Behavior normal.         Thought Content:  Thought content normal.         Judgment: Judgment normal.        Results   Lung Nodule Screening     [] Qualifies    [] Does not qualify   [] Declined    [x] Completed    The USPSTF recommends annual screening for lung cancer with low-dose computed tomography (LDCT) in adults aged 48 to 80 years who have a 30 pack-year smoking history and currently smoke or have quit within the past 20 years. Screening should be discontinued once a person has not smoked for 20 years or develops a health problem that substantially limits life expectancy or the ability or willingness to have curative lung surgery. 10/18/2022     NONCONTRAST SCREENING CT CHEST:     FINDINGS: LUNGS NODULES: 1. There are no suspicious masses or nodules. 2. There is a 4 mm subsolid nodule in the left upper lobe seen on image 39, stable compared to prior exam.  LYMPHADENOPATHY: 1. There are no pathologically enlarged lymph nodes. OTHER (LUNGS/MEDIASTINUM/MUSCULOSKELETAL/ABDOMEN): 1. There is aneurysmal dilatation of the ascending aorta measuring 4 cm in greatest diameter at the level of the main pulmonary artery, stable compared to prior exam. 2. Redemonstration of chronic rib fractures on the right with mild adjacent scar. 1. Stable benign-appearing nodule in the left upper lobe. 2. There are no pathologically enlarged lymph nodes. 3. Ascending aortic aneurysm measuring 4 cm in diameter. 4. LUNGRADS ASSESSMENT VALUE: 2, LUNGRADS S MODIFIER        Assessment      Diagnosis Orders   1.  Stage 3 severe COPD by GOLD classification ([facility] [address]  fluticasone-salmeterol (ADVAIR HFA) 230-21 MCG/ACT inhaler    Umeclidinium Bromide (INCRUSE ELLIPTA) 62.5 MCG/INH AEPB      2. Personal history of tobacco use              Plan   -LDCT reviewed no acute findings seen - will not repeat next year   -Continue Advair as prescribed- refilled today   -Continue Incruse as prescribed- refilled today   -Continue Albuterol Prn for SOB/wheezing   -Advised to maintain pneumonia vaccine with PCP and to take flu vaccine this coming season.  -Advised patient to call office with any changes, questions, or concerns regarding respiratory status    Will see Mitul Mcnulty back in: 1 year    Ramos Verdin, [address] [facility]  11/15/2022

## 2022-11-15 NOTE — PROGRESS NOTES
Patient is experiencing SOB: Yes not very often. Patient is experiencing wheezing: Yes a little bit. Patient states they have had a Weak, productive = 2 cough. Phlegm is occasional, color:  clear/cream white    Patient is coughing up blood: no    Patient has been experiencing chest pains: non-existent    Patient is currently taking the following inhaler(s): Incruse, Advair, Albuterol. Patient is currently taking the following nebulizer treatment(s): Albuterol    Patient is using their rescue inhaler 2-4 times per day. Patient is currently using 3 liters of oxygen during sleep.

## 2023-03-15 DIAGNOSIS — J44.9 STAGE 3 SEVERE COPD BY GOLD CLASSIFICATION (HCC): ICD-10-CM

## 2023-03-15 RX ORDER — ALBUTEROL SULFATE 90 UG/1
AEROSOL, METERED RESPIRATORY (INHALATION)
Qty: 1 EACH | Refills: 3 | Status: SHIPPED | OUTPATIENT
Start: 2023-03-15

## 2023-07-19 ENCOUNTER — TELEPHONE (OUTPATIENT)
Dept: PULMONOLOGY | Age: 78
End: 2023-07-19

## 2023-07-19 DIAGNOSIS — Z87.891 PERSONAL HISTORY OF TOBACCO USE: ICD-10-CM

## 2023-07-19 DIAGNOSIS — J44.9 STAGE 3 SEVERE COPD BY GOLD CLASSIFICATION (HCC): Primary | ICD-10-CM

## 2023-07-19 NOTE — TELEPHONE ENCOUNTER
Pt called in and stated that she is trying to quit smoking and has been doing very good up until the last couple of days and she has started smoking again. She stated that she started smoking again bc it she felt sick when she wasn't smoking, she is asking for advice on what options she has that could help her quit smoking. She also stated that she has been using her Albuterol Sulfate more often than what was instructed and she is wondering if her dosage should be increased or if her meds needs changed? She is wondering if you would like her to come in before her f/u 11/14?  Please advise, thank you

## 2023-07-20 NOTE — TELEPHONE ENCOUNTER
Called pt back to inform her about Referral to smoking cessation, she refused and said that she does not want to make the trips to the hospital. Pt said she is \"very congested and has been using vaseline and saline to clear her nose and its helping some, but she still  feels very plugged up\". She is also confused about her medications and doesn't know if she is using the recommended dosages or not. Spoke with Sanjay Bryant and she advised to bring her in for an appt. Tried to call pt to schedule appt and had to LVM.

## 2023-07-21 ENCOUNTER — OFFICE VISIT (OUTPATIENT)
Dept: PULMONOLOGY | Age: 78
End: 2023-07-21

## 2023-07-21 VITALS
OXYGEN SATURATION: 100 % | SYSTOLIC BLOOD PRESSURE: 148 MMHG | HEIGHT: 65 IN | BODY MASS INDEX: 23.39 KG/M2 | TEMPERATURE: 98 F | HEART RATE: 78 BPM | DIASTOLIC BLOOD PRESSURE: 60 MMHG | WEIGHT: 140.4 LBS

## 2023-07-21 DIAGNOSIS — J44.9 STAGE 3 SEVERE COPD BY GOLD CLASSIFICATION (HCC): Primary | ICD-10-CM

## 2023-07-21 DIAGNOSIS — Z72.0 CURRENTLY ATTEMPTING TO QUIT SMOKING: ICD-10-CM

## 2023-07-21 DIAGNOSIS — Z87.891 PERSONAL HISTORY OF TOBACCO USE: ICD-10-CM

## 2023-07-21 DIAGNOSIS — J96.11 CHRONIC RESPIRATORY FAILURE WITH HYPOXIA (HCC): ICD-10-CM

## 2023-07-21 RX ORDER — TRAMADOL HYDROCHLORIDE 50 MG/1
TABLET ORAL
COMMUNITY
Start: 2023-06-29

## 2023-07-21 RX ORDER — VARENICLINE TARTRATE 0.5 MG/1
TABLET, FILM COATED ORAL
Qty: 319 TABLET | Refills: 0 | Status: CANCELLED | OUTPATIENT
Start: 2023-07-21

## 2023-07-21 RX ORDER — VARENICLINE TARTRATE 0.5 MG/1
.5-1 TABLET, FILM COATED ORAL SEE ADMIN INSTRUCTIONS
Qty: 57 TABLET | Refills: 1 | Status: SHIPPED | OUTPATIENT
Start: 2023-07-21

## 2023-07-21 RX ORDER — METHYLPREDNISOLONE 4 MG/1
TABLET ORAL
COMMUNITY
Start: 2023-07-07

## 2023-07-21 ASSESSMENT — ENCOUNTER SYMPTOMS
EYES NEGATIVE: 1
COUGH: 0
WHEEZING: 0
GASTROINTESTINAL NEGATIVE: 1
SHORTNESS OF BREATH: 1
ALLERGIC/IMMUNOLOGIC NEGATIVE: 1

## 2023-07-26 ENCOUNTER — TELEPHONE (OUTPATIENT)
Dept: PHARMACY | Age: 78
End: 2023-07-26

## 2023-07-26 NOTE — TELEPHONE ENCOUNTER
Referral to [facility] [place] received from Henry Medrano CNP for Smoking Cessation Patient Education/Counseling. Called patient, she declined enrollment at this time. She said \"I just don't believe it it\". Tried to redirect patient using the 5 A's, patient continues to voice that she is not interested in any additional assistance with smoking cessation.    Will close referral.

## 2023-08-07 ENCOUNTER — APPOINTMENT (OUTPATIENT)
Dept: CT IMAGING | Age: 78
End: 2023-08-07
Payer: MEDICARE

## 2023-08-07 ENCOUNTER — APPOINTMENT (OUTPATIENT)
Dept: GENERAL RADIOLOGY | Age: 78
End: 2023-08-07
Payer: MEDICARE

## 2023-08-07 ENCOUNTER — HOSPITAL ENCOUNTER (EMERGENCY)
Age: 78
Discharge: HOME | End: 2023-08-07
Attending: FAMILY MEDICINE
Payer: MEDICARE

## 2023-08-07 VITALS
WEIGHT: 140 LBS | BODY MASS INDEX: 23.9 KG/M2 | RESPIRATION RATE: 26 BRPM | OXYGEN SATURATION: 94 % | TEMPERATURE: 98.8 F | HEART RATE: 90 BPM | SYSTOLIC BLOOD PRESSURE: 165 MMHG | HEIGHT: 64 IN | DIASTOLIC BLOOD PRESSURE: 69 MMHG

## 2023-08-07 DIAGNOSIS — S81.011A KNEE LACERATION, RIGHT, INITIAL ENCOUNTER: ICD-10-CM

## 2023-08-07 DIAGNOSIS — S80.01XA CONTUSION OF RIGHT KNEE, INITIAL ENCOUNTER: ICD-10-CM
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
## 2023-08-07 DIAGNOSIS — I49.8 BIGEMINY: ICD-10-CM

## 2023-08-07 LAB
ALBUMIN SERPL BCP-MCNC: 3.8 GM/DL (ref 3.4–5)
ALP SERPL-CCNC: 61 U/L (ref 46–116)
ALT SERPL W P-5'-P-CCNC: 13 U/L (ref 14–63)
ANION GAP SERPL CALC-SCNC: 8 MEQ/L (ref 8–16)
AST SERPL W P-5'-P-CCNC: 15 U/L (ref 15–37)
BASOPHILS # BLD: 0.4 % (ref 0–3)
BASOPHILS ABSOLUTE: 0 THOU/MM3 (ref 0–0.1)
BILIRUB DIRECT SERPL-MCNC: 0.1 MG/DL (ref 0–0.2)
BILIRUB SERPL-MCNC: 0.4 MG/DL (ref 0.2–1)
BUN SERPL-MCNC: 20 MG/DL (ref 7–18)
CALCIUM SERPL-MCNC: 9.2 MG/DL (ref 8.5–10.1)
CHLORIDE SERPL-SCNC: 100 MEQ/L (ref 98–107)
CO2 SERPL-SCNC: 30 MEQ/L (ref 21–32)
CREAT SERPL-MCNC: 0.9 MG/DL (ref 0.6–1.3)
EOSINOPHILS ABSOLUTE: 0.2 THOU/MM3 (ref 0–0.5)
EOSINOPHILS RELATIVE PERCENT: 1.7 % (ref 0–4)
GFR SERPL CREATININE-BSD FRML MDRD: > 60 ML/MIN/1.73M2
GLUCOSE SERPL-MCNC: 114 MG/DL (ref 74–106)
HCT VFR BLD CALC: 32.7 % (ref 37–47)
HEMOGLOBIN: 10.9 GM/DL (ref 12–16)
IMMATURE GRANS (ABS): 0.02 THOU/MM3 (ref 0–0.07)
IMMATURE GRANULOCYTES: 0 %
LYMPHOCYTES # BLD AUTO: 19.6 % (ref 15–47)
LYMPHOCYTES ABSOLUTE: 1.8 THOU/MM3 (ref 1–4.8)
MCH RBC QN AUTO: 33.4 PG (ref 26–32)
MCHC RBC AUTO-ENTMCNC: 33.3 GM/DL (ref 31–35)
MCV RBC AUTO: 100.3 FL (ref 81–99)
MONOCYTES: 0.9 THOU/MM3 (ref 0.3–1.3)
MONOCYTES: 10.3 % (ref 0–12)
PDW BLD-RTO: 12.4 % (ref 11.5–14.9)
PLATELET # BLD AUTO: 276 THOU/MM3 (ref 130–400)
PMV BLD AUTO: 8.6 FL (ref 9.4–12.4)
POTASSIUM SERPL-SCNC: 3.9 MEQ/L (ref 3.5–5.1)
PROT SERPL-MCNC: 7.2 GM/DL (ref 6.4–8.2)
RBC # BLD: 3.26 MILL/MM3 (ref 4.1–5.3)
SEG NEUTROPHILS: 67.8 % (ref 43–75)
SEGMENTED NEUTROPHILS ABSOLUTE COUNT: 6.1 THOU/MM3 (ref 1.8–7.7)
SODIUM SERPL-SCNC: 138 MEQ/L (ref 136–145)
TROPONIN, HIGH SENSITIVITY: 8.4 PG/ML (ref 0–51.3)
WBC # BLD: 8.9 THOU/MM3 (ref 4.8–10.8)

## 2023-08-07 PROCEDURE — 80048 BASIC METABOLIC PNL TOTAL CA: CPT

## 2023-08-07 PROCEDURE — 6360000002 HC RX W HCPCS: Performed by: STUDENT IN AN ORGANIZED HEALTH CARE EDUCATION/TRAINING PROGRAM

## 2023-08-07 PROCEDURE — 70486 CT MAXILLOFACIAL W/O DYE: CPT

## 2023-08-07 PROCEDURE — 85025 COMPLETE CBC W/AUTO DIFF WBC: CPT

## 2023-08-07 PROCEDURE — 90471 IMMUNIZATION ADMIN: CPT | Performed by: STUDENT IN AN ORGANIZED HEALTH CARE EDUCATION/TRAINING PROGRAM

## 2023-08-07 PROCEDURE — 72125 CT NECK SPINE W/O DYE: CPT

## 2023-08-07 PROCEDURE — 80076 HEPATIC FUNCTION PANEL: CPT

## 2023-08-07 PROCEDURE — 90715 TDAP VACCINE 7 YRS/> IM: CPT | Performed by: STUDENT IN AN ORGANIZED HEALTH CARE EDUCATION/TRAINING PROGRAM

## 2023-08-07 PROCEDURE — 6370000000 HC RX 637 (ALT 250 FOR IP): Performed by: STUDENT IN AN ORGANIZED HEALTH CARE EDUCATION/TRAINING PROGRAM

## 2023-08-07 PROCEDURE — 72170 X-RAY EXAM OF PELVIS: CPT

## 2023-08-07 PROCEDURE — 84484 ASSAY OF TROPONIN QUANT: CPT

## 2023-08-07 PROCEDURE — 99285 EMERGENCY DEPT VISIT HI MDM: CPT

## 2023-08-07 PROCEDURE — 71046 X-RAY EXAM CHEST 2 VIEWS: CPT

## 2023-08-07 PROCEDURE — 93005 ELECTROCARDIOGRAM TRACING: CPT | Performed by: STUDENT IN AN ORGANIZED HEALTH CARE EDUCATION/TRAINING PROGRAM

## 2023-08-07 PROCEDURE — 12002 RPR S/N/AX/GEN/TRNK2.6-7.5CM: CPT

## 2023-08-07 PROCEDURE — 73564 X-RAY EXAM KNEE 4 OR MORE: CPT

## 2023-08-07 PROCEDURE — 70450 CT HEAD/BRAIN W/O DYE: CPT

## 2023-08-07 RX ORDER — ACETAMINOPHEN 325 MG/1
650 TABLET ORAL ONCE
Status: COMPLETED | OUTPATIENT
Start: 2023-08-07 | End: 2023-08-07

## 2023-08-07 RX ADMIN — TETANUS TOXOID, REDUCED DIPHTHERIA TOXOID AND ACELLULAR PERTUSSIS VACCINE, ADSORBED 0.5 ML: 5; 2.5; 8; 8; 2.5 SUSPENSION INTRAMUSCULAR at 14:04

## 2023-08-07 RX ADMIN — ACETAMINOPHEN 650 MG: 325 TABLET ORAL at 14:04

## 2023-08-07 ASSESSMENT — PAIN DESCRIPTION - DESCRIPTORS: DESCRIPTORS: ACHING

## 2023-08-07 ASSESSMENT — PAIN DESCRIPTION - LOCATION
LOCATION: KNEE
LOCATION: KNEE

## 2023-08-07 ASSESSMENT — PAIN SCALES - GENERAL
PAINLEVEL_OUTOF10: 4
PAINLEVEL_OUTOF10: 4

## 2023-08-07 ASSESSMENT — PAIN DESCRIPTION - ORIENTATION
ORIENTATION: RIGHT
ORIENTATION: RIGHT

## 2023-08-07 NOTE — ED PROVIDER NOTES
[address] [facility]      EMERGENCY MEDICINE     Pt Name: Danial Hernandez  MRN: [id number]  [address] [facility] 1945  Date of evaluation: 8/7/2023  Provider: Gonzalo Arana MD    CHIEF COMPLAINT       Chief Complaint   Patient presents with    Fall     Lac to right knee 4.5cm     HISTORY OF PRESENT ILLNESS   Danial Hernandez is a pleasant 77 y.o. female who presents to the emergency department from  street , by private vehicle for evaluation of fall. Patient presents to the emergency department after having a fall, mechanical, patient states s she fell after slipping on a step, patient states falling backwards however presents excoriations in face and knee, currently she is complaining of right knee pain, hands pain, no other symptoms, no headache, no chest pain, no neck pain, no abdominal pain. Patient denies dizziness chest pain or shortness of breath before falling, no lightheadedness before falling    PASTMEDICAL HISTORY     Past Medical History:   Diagnosis Date    Anemia     Dyslipidemia     HLD (hyperlipidemia)     HTN (hypertension)     Lower extremity edema     Membranous glomerulonephritis     Nephrotic syndrome     Proteinuria        Patient Active Problem List   Diagnosis Code    Nephrotic syndrome N04.9    HTN (hypertension) I10    Lower extremity edema R60.0    Membranous glomerulonephritis N05.2    Proteinuria R80.9    Anemia D64.9    Edema of both legs R60.0    Dyslipidemia E78.5    Hyponatremia E87.1    COPD exacerbation ([facility]) J44.1    Nicotine abuse Z72.0    COPD (chronic obstructive pulmonary disease) ([facility]) J44.9    Abnormal CT of the chest R93.89    Pneumonia of left upper lobe due to infectious organism J18.9     SURGICAL HISTORY     History reviewed. No pertinent surgical history. CURRENT MEDICATIONS       Previous Medications    ALBUTEROL (PROVENTIL) (2.5 MG/3ML) 0.083% NEBULIZER SOLUTION    USE 1 VIAL IN NEBULIZER EVERY 6 HOURS AS NEEDED FOR WHEEZING.     ALBUTEROL Temp:    SpO2: 94%     Physical Exam  Constitutional:       General: She is not in acute distress. Appearance: She is not ill-appearing, toxic-appearing or diaphoretic. HENT:      Head: Normocephalic and atraumatic. Right Ear: External ear normal.      Left Ear: External ear normal.      Nose: No congestion. Mouth/Throat:      Mouth: Mucous membranes are moist.      Pharynx: No oropharyngeal exudate or posterior oropharyngeal erythema. Eyes:      Extraocular Movements: Extraocular movements intact. Pupils: Pupils are equal, round, and reactive to light. Cardiovascular:      Rate and Rhythm: Normal rate and regular rhythm. Pulses: Normal pulses. Heart sounds: No murmur heard. No friction rub. No gallop. Pulmonary:      Effort: Pulmonary effort is normal. No respiratory distress. Breath sounds: Normal breath sounds. No stridor. No wheezing, rhonchi or rales. Chest:      Chest wall: No tenderness. Abdominal:      General: Abdomen is flat. There is no distension. Palpations: Abdomen is soft. Tenderness: There is no abdominal tenderness. There is no guarding or rebound. Musculoskeletal:         General: No swelling, deformity or signs of injury. Normal range of motion. Cervical back: Tenderness (Right paravertebral lower spine trigger point) present. No rigidity. Legs:    Skin:     General: Skin is warm. Capillary Refill: Capillary refill takes less than 2 seconds. Findings: No lesion or rash. Neurological:      General: No focal deficit present. Mental Status: She is alert and oriented to person, place, and time. Sensory: No sensory deficit. Motor: No weakness.       Coordination: Coordination normal.   Psychiatric:         Mood and Affect: Mood normal.         Behavior: Behavior normal.       FORMAL DIAGNOSTIC RESULTS     RADIOLOGY: Interpretation per the Radiologist below, if available at the time of this note (none

## 2023-08-07 NOTE — ED NOTES
Patient presents today complaining of a fall after slipping on a step in the rain. She fell on both her knees, the right knee has a 4.5 cm laceration to patellar area. Bleeding is controlled. Patient denies taking any anticoagulants. She has bruising and swelling noted to left knee. She bit her lip during fall and superficial laceration noted to right lower lip. Denies any loc or head injury.      Roger Handler, RN  08/07/23 [id number]

## 2023-08-08 PROCEDURE — 93010 ELECTROCARDIOGRAM REPORT: CPT | Performed by: INTERNAL MEDICINE

## 2023-08-11 LAB
EKG ATRIAL RATE: 88 BPM
EKG P AXIS: 39 DEGREES
EKG P-R INTERVAL: 134 MS
EKG Q-T INTERVAL: 372 MS
EKG QRS DURATION: 80 MS
EKG QTC CALCULATION (BAZETT): 450 MS
EKG R AXIS: 23 DEGREES
EKG T AXIS: 38 DEGREES
EKG VENTRICULAR RATE: 88 BPM

## 2023-08-22 DIAGNOSIS — J44.9 STAGE 3 SEVERE COPD BY GOLD CLASSIFICATION (HCC): ICD-10-CM

## 2023-08-22 NOTE — TELEPHONE ENCOUNTER
Christin Uribe called requesting a refill on the following medications:  Requested Prescriptions     Pending Prescriptions Disp Refills    albuterol sulfate HFA (PROVENTIL;VENTOLIN;PROAIR) 108 (90 Base) MCG/ACT inhaler 1 each 3     Sig: INHALE 2 PUFFS EVERY 6 HOURS AS NEEDED FOR WHEEZING. [facility]      Date of last visit: 7/21/2023  Date of next visit (if applicable): 86/02/2722

## 2023-08-23 RX ORDER — ALBUTEROL SULFATE 90 UG/1
AEROSOL, METERED RESPIRATORY (INHALATION)
Qty: 1 EACH | Refills: 3 | Status: SHIPPED | OUTPATIENT
Start: 2023-08-23

## 2023-09-19 ENCOUNTER — OFFICE VISIT (OUTPATIENT)
Dept: PULMONOLOGY | Age: 78
End: 2023-09-19
Payer: MEDICARE

## 2023-09-19 VITALS
HEIGHT: 64 IN | OXYGEN SATURATION: 97 % | HEART RATE: 78 BPM | DIASTOLIC BLOOD PRESSURE: 65 MMHG | TEMPERATURE: 97.9 F | WEIGHT: 138.4 LBS | SYSTOLIC BLOOD PRESSURE: 140 MMHG | BODY MASS INDEX: 23.63 KG/M2

## 2023-09-19 DIAGNOSIS — Z87.891 PERSONAL HISTORY OF TOBACCO USE: ICD-10-CM

## 2023-09-19 DIAGNOSIS — J44.9 STAGE 3 SEVERE COPD BY GOLD CLASSIFICATION (HCC): Primary | ICD-10-CM

## 2023-09-19 DIAGNOSIS — Z72.0 NICOTINE ABUSE: ICD-10-CM

## 2023-09-19 DIAGNOSIS — R06.02 INCREASING SHORTNESS OF BREATH: ICD-10-CM

## 2023-09-19 PROCEDURE — 1123F ACP DISCUSS/DSCN MKR DOCD: CPT | Performed by: NURSE PRACTITIONER

## 2023-09-19 PROCEDURE — G8420 CALC BMI NORM PARAMETERS: HCPCS | Performed by: NURSE PRACTITIONER

## 2023-09-19 PROCEDURE — 3078F DIAST BP <80 MM HG: CPT | Performed by: NURSE PRACTITIONER

## 2023-09-19 PROCEDURE — 3023F SPIROM DOC REV: CPT | Performed by: NURSE PRACTITIONER

## 2023-09-19 PROCEDURE — 99406 BEHAV CHNG SMOKING 3-10 MIN: CPT | Performed by: NURSE PRACTITIONER

## 2023-09-19 PROCEDURE — G8400 PT W/DXA NO RESULTS DOC: HCPCS | Performed by: NURSE PRACTITIONER

## 2023-09-19 PROCEDURE — 1090F PRES/ABSN URINE INCON ASSESS: CPT | Performed by: NURSE PRACTITIONER

## 2023-09-19 PROCEDURE — 94664 DEMO&/EVAL PT USE INHALER: CPT | Performed by: NURSE PRACTITIONER

## 2023-09-19 PROCEDURE — 4004F PT TOBACCO SCREEN RCVD TLK: CPT | Performed by: NURSE PRACTITIONER

## 2023-09-19 PROCEDURE — G8427 DOCREV CUR MEDS BY ELIG CLIN: HCPCS | Performed by: NURSE PRACTITIONER

## 2023-09-19 PROCEDURE — 99213 OFFICE O/P EST LOW 20 MIN: CPT | Performed by: NURSE PRACTITIONER

## 2023-09-19 PROCEDURE — 3077F SYST BP >= 140 MM HG: CPT | Performed by: NURSE PRACTITIONER

## 2023-09-19 RX ORDER — ALBUTEROL SULFATE 2.5 MG/3ML
2.5 SOLUTION RESPIRATORY (INHALATION) EVERY 4 HOURS PRN
Qty: 225 ML | Refills: 3 | Status: SHIPPED | OUTPATIENT
Start: 2023-09-19

## 2023-09-19 ASSESSMENT — ENCOUNTER SYMPTOMS
WHEEZING: 1
ALLERGIC/IMMUNOLOGIC NEGATIVE: 1
EYES NEGATIVE: 1
SHORTNESS OF BREATH: 1
COUGH: 0
GASTROINTESTINAL NEGATIVE: 1

## 2023-09-19 NOTE — PROGRESS NOTES
Albuterol PRN for SOB/wheezing   -Chantix stopped due to S/E  -Advised to maintain pneumonia vaccine with PCP and to take flu vaccine this coming season.  -Advised patient to call office with any changes, questions, or concerns regarding respiratory status  I advised patient to quit, and offered support. Educational material distributed. Discussed current use pattern. , advised to quit, barriers to quit were reviewed and discussed,  smoking cessation discussed for minutes: 5-10 minutes     Will see Brianda Zavala back in: 1 month    Gisselle Zarate [facility]  9/19/2023

## 2023-09-29 ENCOUNTER — HOSPITAL ENCOUNTER (EMERGENCY)
Age: 78
Discharge: HOME | End: 2023-09-29
Attending: EMERGENCY MEDICINE
Payer: MEDICARE

## 2023-09-29 ENCOUNTER — APPOINTMENT (OUTPATIENT)
Dept: GENERAL RADIOLOGY | Age: 78
End: 2023-09-29
Payer: MEDICARE

## 2023-09-29 ENCOUNTER — APPOINTMENT (OUTPATIENT)
Dept: CT IMAGING | Age: 78
End: 2023-09-29
Payer: MEDICARE

## 2023-09-29 VITALS
HEIGHT: 65 IN | BODY MASS INDEX: 22.99 KG/M2 | TEMPERATURE: 99.1 F | HEART RATE: 89 BPM | OXYGEN SATURATION: 94 % | WEIGHT: 138 LBS | RESPIRATION RATE: 20 BRPM | SYSTOLIC BLOOD PRESSURE: 146 MMHG | DIASTOLIC BLOOD PRESSURE: 93 MMHG

## 2023-09-29 DIAGNOSIS — R53.83 OTHER FATIGUE: Primary | ICD-10-CM

## 2023-09-29 DIAGNOSIS — J44.9 CHRONIC OBSTRUCTIVE PULMONARY DISEASE, UNSPECIFIED COPD TYPE (HCC): ICD-10-CM

## 2023-09-29 LAB
ALBUMIN SERPL BCP-MCNC: 4 GM/DL (ref 3.4–5)
ALP SERPL-CCNC: 74 U/L (ref 46–116)
ALT SERPL W P-5'-P-CCNC: 20 U/L (ref 14–63)
AMORPH SED URNS QL MICRO: ABNORMAL
ANION GAP SERPL CALC-SCNC: 7 MEQ/L (ref 8–16)
AST SERPL W P-5'-P-CCNC: 16 U/L (ref 15–37)
BACTERIA URNS QL MICRO: ABNORMAL
BASOPHILS # BLD: 0.7 % (ref 0–3)
BASOPHILS ABSOLUTE: 0.1 THOU/MM3 (ref 0–0.1)
BILIRUB DIRECT SERPL-MCNC: 0.12 MG/DL (ref 0–0.2)
BILIRUB SERPL-MCNC: 0.5 MG/DL (ref 0.2–1)
BILIRUB UR QL STRIP.AUTO: NEGATIVE
BUN SERPL-MCNC: 16 MG/DL (ref 7–18)
CALCIUM SERPL-MCNC: 9.2 MG/DL (ref 8.5–10.1)
CASTS #/AREA URNS LPF: ABNORMAL /LPF
CHARACTER UR: ABNORMAL
CHLORIDE SERPL-SCNC: 101 MEQ/L (ref 98–107)
CO2 SERPL-SCNC: 30 MEQ/L (ref 21–32)
COLOR UR AUTO: YELLOW
CREAT SERPL-MCNC: 0.8 MG/DL (ref 0.6–1.3)
CRYSTALS VITF MICRO: ABNORMAL
EKG ATRIAL RATE: 115 BPM
EKG P AXIS: 57 DEGREES
EKG Q-T INTERVAL: 330 MS
EKG QRS DURATION: 78 MS
EKG QTC CALCULATION (BAZETT): 433 MS
EKG R AXIS: 32 DEGREES
EKG T AXIS: 53 DEGREES
EKG VENTRICULAR RATE: 104 BPM
EOSINOPHILS ABSOLUTE: 0.1 THOU/MM3 (ref 0–0.5)
EOSINOPHILS RELATIVE PERCENT: 0.9 % (ref 0–4)
EPI CELLS #/AREA URNS HPF: ABNORMAL /HPF
FLUAV AG SPEC QL: NEGATIVE
FLUBV AG SPEC QL: NEGATIVE
GFR SERPL CREATININE-BSD FRML MDRD: > 60 ML/MIN/1.73M2
GLUCOSE SERPL-MCNC: 106 MG/DL (ref 74–106)
GLUCOSE UR QL STRIP.AUTO: NEGATIVE MG/DL
HCT VFR BLD CALC: 36.3 % (ref 37–47)
HEMOGLOBIN: 12.3 GM/DL (ref 12–16)
HGB UR STRIP.AUTO-MCNC: NEGATIVE MG/L
IMMATURE GRANS (ABS): 0.02 THOU/MM3 (ref 0–0.07)
IMMATURE GRANULOCYTES: 0 %
KETONES UR QL STRIP.AUTO: NEGATIVE
LEUKOCYTE ESTERASE UR QL STRIP.AUTO: NEGATIVE
LIPASE SERPL-CCNC: 121 U/L (ref 73–393)
LYMPHOCYTES # BLD AUTO: 15.4 % (ref 15–47)
LYMPHOCYTES ABSOLUTE: 1.4 THOU/MM3 (ref 1–4.8)
MAGNESIUM SERPL-MCNC: 1.7 MG/DL (ref 1.8–2.4)
MCH RBC QN AUTO: 32.8 PG (ref 26–32)
MCHC RBC AUTO-ENTMCNC: 33.9 GM/DL (ref 31–35)
MCV RBC AUTO: 96.8 FL (ref 81–99)
MONOCYTES: 1 THOU/MM3 (ref 0.3–1.3)
MONOCYTES: 11.3 % (ref 0–12)
MUCOUS THREADS URNS QL MICRO: ABNORMAL
NITRITE UR QL STRIP.AUTO: NEGATIVE
NT PRO BNP: 1213 PG/ML (ref 0–1800)
PDW BLD-RTO: 11.3 % (ref 11.5–14.9)
PH UR STRIP.AUTO: 7.5 [PH] (ref 5–9)
PLATELET # BLD AUTO: 326 THOU/MM3 (ref 130–400)
PMV BLD AUTO: 8.5 FL (ref 9.4–12.4)
POTASSIUM SERPL-SCNC: 3.5 MEQ/L (ref 3.5–5.1)
PROT SERPL-MCNC: 7.5 GM/DL (ref 6.4–8.2)
PROT UR STRIP.AUTO-MCNC: 30 MG/DL
RBC # BLD: 3.75 MILL/MM3 (ref 4.1–5.3)
RBC #/AREA URNS HPF: ABNORMAL /HPF
REFLEX TO URINE C & S: ABNORMAL
SARS-COV-2 RDRP RESP QL NAA+PROBE: NOT  DETECTED
SEG NEUTROPHILS: 71.5 % (ref 43–75)
SEGMENTED NEUTROPHILS ABSOLUTE COUNT: 6.3 THOU/MM3 (ref 1.8–7.7)
SODIUM SERPL-SCNC: 138 MEQ/L (ref 136–145)
SP GR UR STRIP.AUTO: 1.01 (ref 1–1.03)
TROPONIN, HIGH SENSITIVITY: 12.6 PG/ML (ref 0–51.3)
UROBILINOGEN UR STRIP-ACNC: 0.2 EU/DL (ref 0–1)
WBC # BLD: 8.8 THOU/MM3 (ref 4.8–10.8)
WBC # UR STRIP.AUTO: ABNORMAL /HPF

## 2023-09-29 PROCEDURE — 71046 X-RAY EXAM CHEST 2 VIEWS: CPT

## 2023-09-29 PROCEDURE — 81001 URINALYSIS AUTO W/SCOPE: CPT

## 2023-09-29 PROCEDURE — 93010 ELECTROCARDIOGRAM REPORT: CPT | Performed by: INTERNAL MEDICINE

## 2023-09-29 PROCEDURE — 72040 X-RAY EXAM NECK SPINE 2-3 VW: CPT

## 2023-09-29 PROCEDURE — 6360000004 HC RX CONTRAST MEDICATION: Performed by: EMERGENCY MEDICINE

## 2023-09-29 PROCEDURE — 87635 SARS-COV-2 COVID-19 AMP PRB: CPT

## 2023-09-29 PROCEDURE — 80048 BASIC METABOLIC PNL TOTAL CA: CPT

## 2023-09-29 PROCEDURE — 71275 CT ANGIOGRAPHY CHEST: CPT

## 2023-09-29 PROCEDURE — 83735 ASSAY OF MAGNESIUM: CPT

## 2023-09-29 PROCEDURE — 85025 COMPLETE CBC W/AUTO DIFF WBC: CPT

## 2023-09-29 PROCEDURE — 87804 INFLUENZA ASSAY W/OPTIC: CPT

## 2023-09-29 PROCEDURE — 80076 HEPATIC FUNCTION PANEL: CPT

## 2023-09-29 PROCEDURE — 93005 ELECTROCARDIOGRAM TRACING: CPT | Performed by: EMERGENCY MEDICINE

## 2023-09-29 PROCEDURE — 84484 ASSAY OF TROPONIN QUANT: CPT

## 2023-09-29 PROCEDURE — 99285 EMERGENCY DEPT VISIT HI MDM: CPT

## 2023-09-29 PROCEDURE — 83880 ASSAY OF NATRIURETIC PEPTIDE: CPT

## 2023-09-29 PROCEDURE — 83690 ASSAY OF LIPASE: CPT

## 2023-09-29 RX ADMIN — IOPAMIDOL 100 ML: 755 INJECTION, SOLUTION INTRAVENOUS at 13:41

## 2023-09-29 ASSESSMENT — ENCOUNTER SYMPTOMS
DIARRHEA: 0
EYE PAIN: 0
ABDOMINAL DISTENTION: 0
CONSTIPATION: 0
TROUBLE SWALLOWING: 0
BLOOD IN STOOL: 0
VOMITING: 0
CHEST TIGHTNESS: 0
EYE REDNESS: 0
WHEEZING: 0
EYE ITCHING: 0
EYE DISCHARGE: 0
SINUS PRESSURE: 0
RHINORRHEA: 0
BACK PAIN: 0
SHORTNESS OF BREATH: 0
SORE THROAT: 0
NAUSEA: 0
CHOKING: 0
PHOTOPHOBIA: 0
ABDOMINAL PAIN: 0
VOICE CHANGE: 0
COUGH: 0

## 2023-09-29 ASSESSMENT — PAIN - FUNCTIONAL ASSESSMENT: PAIN_FUNCTIONAL_ASSESSMENT: NONE - DENIES PAIN

## 2023-09-29 NOTE — ED NOTES
Pt complains of feeling fatigued, nauseated, coughing and chest pain on and off for the last week. Pt also complains of chest pain radiating to r shoulder. Pt denies SOB or diaphoresis. Pt admits to a decreased appetite. Pt is on home O2 at 4lpm via nasal cannule at night. Pt alert, resp even and unlabored, skin pale, warm and dry, no pedal edema noted. Cardiac monitor applied.      Domingo Reyes RN  09/29/23 [id number]

## 2023-09-29 NOTE — DISCHARGE INSTRUCTIONS
Patient has what appears to be nonspecific fatigue. She also has what appears to be COPD. Patient is instructed to follow-up with a primary care physician and her pulmonologist and cardiology and call for appointments within the next 2 to 3 days. Patient is instructed to take all current medications as prescribed. Patient is instructed return to the nearest emergency room immediately for any new or worsening complaints.

## 2023-09-29 NOTE — ED NOTES
Pt returned from scanning, denies any complaints at this time. Pt to the restroom, clean catch urine sample obtained and given to lab.      Joanna Lee RN  09/29/23 [id number]

## 2023-09-29 NOTE — ED NOTES
Pt resting, resp even and unlabored, skin pink, warm and dry. Pt denies any complaints beside feeling tired. INT discontinued. Pt given discharge instructions and verbalizes understanding, pt released with daughter.      Nathan Gong RN  09/29/23 [id number]

## 2023-09-30 LAB
EKG ATRIAL RATE: 91 BPM
EKG P AXIS: 51 DEGREES
EKG P-R INTERVAL: 154 MS
EKG Q-T INTERVAL: 364 MS
EKG QRS DURATION: 82 MS
EKG QTC CALCULATION (BAZETT): 447 MS
EKG R AXIS: 33 DEGREES
EKG T AXIS: 47 DEGREES
EKG VENTRICULAR RATE: 91 BPM

## 2023-09-30 PROCEDURE — 93010 ELECTROCARDIOGRAM REPORT: CPT | Performed by: INTERNAL MEDICINE

## 2023-10-02 LAB
EKG ATRIAL RATE: 115 BPM
EKG ATRIAL RATE: 91 BPM
EKG P AXIS: 51 DEGREES
EKG P AXIS: 57 DEGREES
EKG P-R INTERVAL: 154 MS
EKG Q-T INTERVAL: 330 MS
EKG Q-T INTERVAL: 364 MS
EKG QRS DURATION: 78 MS
EKG QRS DURATION: 82 MS
EKG QTC CALCULATION (BAZETT): 433 MS
EKG QTC CALCULATION (BAZETT): 447 MS
EKG R AXIS: 32 DEGREES
EKG R AXIS: 33 DEGREES
EKG T AXIS: 47 DEGREES
EKG T AXIS: 53 DEGREES
EKG VENTRICULAR RATE: 104 BPM
EKG VENTRICULAR RATE: 91 BPM

## 2023-10-20 ENCOUNTER — HOSPITAL ENCOUNTER (OUTPATIENT)
Dept: PULMONOLOGY | Age: 78
Discharge: HOME | End: 2023-10-20
Payer: MEDICARE

## 2023-10-20 ENCOUNTER — HOSPITAL ENCOUNTER (OUTPATIENT)
Dept: CT IMAGING | Age: 78
Discharge: HOME | End: 2023-10-20
Payer: MEDICARE

## 2023-10-20 ENCOUNTER — HOSPITAL ENCOUNTER (OUTPATIENT)
Dept: NON INVASIVE DIAGNOSTICS | Age: 78
Discharge: HOME | End: 2023-10-20
Payer: MEDICARE

## 2023-10-20 DIAGNOSIS — Z87.891 PERSONAL HISTORY OF TOBACCO USE: ICD-10-CM

## 2023-10-20 DIAGNOSIS — J44.9 STAGE 3 SEVERE COPD BY GOLD CLASSIFICATION (HCC): ICD-10-CM

## 2023-10-20 DIAGNOSIS — R06.02 INCREASING SHORTNESS OF BREATH: ICD-10-CM

## 2023-10-20 DIAGNOSIS — J96.11 CHRONIC RESPIRATORY FAILURE WITH HYPOXIA (HCC): ICD-10-CM

## 2023-10-20 PROCEDURE — 93306 TTE W/DOPPLER COMPLETE: CPT

## 2023-10-20 PROCEDURE — 71271 CT THORAX LUNG CANCER SCR C-: CPT

## 2023-10-20 PROCEDURE — 94060 EVALUATION OF WHEEZING: CPT

## 2023-10-24 ENCOUNTER — OFFICE VISIT (OUTPATIENT)
Dept: PULMONOLOGY | Age: 78
End: 2023-10-24
Payer: MEDICARE

## 2023-10-24 VITALS
SYSTOLIC BLOOD PRESSURE: 130 MMHG | BODY MASS INDEX: 23.15 KG/M2 | HEIGHT: 64 IN | TEMPERATURE: 98.3 F | DIASTOLIC BLOOD PRESSURE: 78 MMHG | OXYGEN SATURATION: 95 % | HEART RATE: 84 BPM | WEIGHT: 135.6 LBS

## 2023-10-24 DIAGNOSIS — Z87.891 PERSONAL HISTORY OF TOBACCO USE: ICD-10-CM

## 2023-10-24 DIAGNOSIS — J44.9 STAGE 3 SEVERE COPD BY GOLD CLASSIFICATION (HCC): Primary | ICD-10-CM

## 2023-10-24 DIAGNOSIS — R06.02 INCREASING SHORTNESS OF BREATH: ICD-10-CM

## 2023-10-24 DIAGNOSIS — J96.11 CHRONIC RESPIRATORY FAILURE WITH HYPOXIA (HCC): ICD-10-CM

## 2023-10-24 DIAGNOSIS — Q24.9: ICD-10-CM

## 2023-10-24 PROCEDURE — 99214 OFFICE O/P EST MOD 30 MIN: CPT | Performed by: NURSE PRACTITIONER

## 2023-10-24 PROCEDURE — 3075F SYST BP GE 130 - 139MM HG: CPT | Performed by: NURSE PRACTITIONER

## 2023-10-24 PROCEDURE — G8420 CALC BMI NORM PARAMETERS: HCPCS | Performed by: NURSE PRACTITIONER

## 2023-10-24 PROCEDURE — 3023F SPIROM DOC REV: CPT | Performed by: NURSE PRACTITIONER

## 2023-10-24 PROCEDURE — G8484 FLU IMMUNIZE NO ADMIN: HCPCS | Performed by: NURSE PRACTITIONER

## 2023-10-24 PROCEDURE — 94618 PULMONARY STRESS TESTING: CPT | Performed by: NURSE PRACTITIONER

## 2023-10-24 PROCEDURE — 1123F ACP DISCUSS/DSCN MKR DOCD: CPT | Performed by: NURSE PRACTITIONER

## 2023-10-24 PROCEDURE — G8427 DOCREV CUR MEDS BY ELIG CLIN: HCPCS | Performed by: NURSE PRACTITIONER

## 2023-10-24 PROCEDURE — 3078F DIAST BP <80 MM HG: CPT | Performed by: NURSE PRACTITIONER

## 2023-10-24 PROCEDURE — 99406 BEHAV CHNG SMOKING 3-10 MIN: CPT | Performed by: NURSE PRACTITIONER

## 2023-10-24 PROCEDURE — G8400 PT W/DXA NO RESULTS DOC: HCPCS | Performed by: NURSE PRACTITIONER

## 2023-10-24 PROCEDURE — 1090F PRES/ABSN URINE INCON ASSESS: CPT | Performed by: NURSE PRACTITIONER

## 2023-10-24 PROCEDURE — 4004F PT TOBACCO SCREEN RCVD TLK: CPT | Performed by: NURSE PRACTITIONER

## 2023-10-24 ASSESSMENT — ENCOUNTER SYMPTOMS
EYES NEGATIVE: 1
COUGH: 0
GASTROINTESTINAL NEGATIVE: 1
SHORTNESS OF BREATH: 0
RESPIRATORY NEGATIVE: 1
WHEEZING: 0
ALLERGIC/IMMUNOLOGIC NEGATIVE: 1

## 2023-10-24 NOTE — PROGRESS NOTES
[facility]    Patient: Loan Mitchell, 77 y.o.   : 1945  10/24/2023      Subjective     Chief Complaint   Patient presents with    Follow-up     1 month copd after testing needs 6mwt on poc        [place] is here for follow up for her Severe COPD and requesting 6MWT as patient doesn't want to give up her POC. 6MWT done in July and patient did NOT require any home oxygen   A1AT MM- normal   Current every day smoker - no real desire to quit   Current inhaler use of Breztri BID- rinsing mouth after use. Using Albuterol PRN   SOB more with exertion   LDCT done with no acute findings. Patient no longer qualifies for screening CT due to turning 79 this May. ECHO done with possible mitral valve disorder- referred already to [facility] appt is in early November. Progress History:   Since last visit any new medical issues? No  New ER or hospital visits? Yes 23 ED visit for fatigue and 23 s/p fall   Any new or changes in medicines? No  Using inhalers? Yes   Are they helpful? Yes   Flu vaccine- not for this season yet   Pneumonia vaccine- UTD per patient   Covid vaccine- vaccine done x 2 and 1 booster     Past Medical hx   PMH:  Past Medical History:   Diagnosis Date    Anemia     Dyslipidemia     HLD (hyperlipidemia)     HTN (hypertension)     Lower extremity edema     Membranous glomerulonephritis     Nephrotic syndrome     Proteinuria      SURGICAL HISTORY:History reviewed. No pertinent surgical history. SOCIAL HISTORY:  Social History     Tobacco Use    Smoking status: Every Day     Packs/day: 1.50     Years: 57.00     Additional pack years: 0.00     Total pack years: 85.50     Types: Cigarettes     Start date:     Smokeless tobacco: Never    Tobacco comments:     4-5 cigarettes a day. 10/24/23   Vaping Use    Vaping Use: Never used   Substance Use Topics    Alcohol use:  Yes     Alcohol/week: 0.0 standard drinks of alcohol Inder is a 8-year-old male who was referred for a neuropsychological evaluation to provide diagnostic clarity as well as treatment recommendations. Inder carries diagnoses of Attention Deficit Hyperactivity Disorder, combined type and Dyslexia. A full report will follow as an abstract encounter.    Inder was accompanied to the visit by his father and stepmother. Several precautions were taken to minimize COVID-19 concerns during in-person testing. Inder and the examiners wore face masks and maintained at least 6 feet of distance. Inder confirmed he could adequately hear and see verbally and visually presented information, respectively. Testing was completed in one session. He was casually dressed and appeared his stated age. Inder  easily from his parents and rapport was quickly established. Inder made appropriate eye contact with the examiner and easily engaged in reciprocal conversation. He was also talkative and forthcoming about his emotions and behaviors. His speech was normal in terms of rate, volume, articulation, prosody, and rhythm. His overall mood and affect throughout testing were positive, but he became sad and frustrated at times. For example, Inder teared up when discussing how he has difficultly regulating his behaviors. He also expressed verbal frustration (e.g.,  I can t do this ) on tasks that were more difficult for him. He demonstrated mild inattention (e.g., looking around the room) and impulsivity (e.g., touching the examiner s materials). Inder was very cooperative throughout testing and appeared motivated. The testing results are likely an accurate estimate of his current neurocognitive functioning.    Due to concerns about Inder s reading ability and past diagnosis of Dyslexia, he completed several measures of academic achievement related to reading. Inder s performance was consistently average compared to his same age peers across these tasks, which included the pronunciation of unfamiliar  words, word identification, spelling, comprehension of written text, and quickly reading simple sentences. Inder also completed two additional tasks that assessed phonological processing skills (i.e., the awareness of the sound structure of language) and his performance was average. He also demonstrated an average ability to quickly name stimuli on a page, but he made more errors than his same age peers. Overall, Inder s reading skills were what would be expected both based on his general cognitive ability and compared to his same age peers.     To gain additional information regarding concerns about Inder s behavioral regulation, he completed tasks that assessed executive functioning (i.e., higher-order cognitive skills required to plan, organize, and achieve one s goals). Inder was administered a computerized measure of sustained visual attention to evaluate his ability to stay on task, respond consistently, and to manage his impulsivity. His performance was average on both measures of sustained attention and inhibition. Inder was also average on a measure of auditory attention, and his performance improved to high average when the demands of the task increased (e.g., having to both inhibit motor responses and switch between different responses). His receptive language skill and ability to follow multi-step instructions were average. However, Inder had greater difficulty on a task that specifically assessed impulse control; his ability to inhibit impulses was impaired but improved to low average when tasked to flexibility switch between alternating demands.     Inder also completed several measures that assessed both short and long-term memory. Inder s performance on a measure of visual memory was mildly below average both immediately and after a delay. His performance on a measure of verbal memory was average when tasked to immediately recall information but was mildly below average after a delay. On a task that assessed  the immediate recall of the details of a story, Inder s performance was high average. Together, these findings suggest that Inder has difficulty learning and remembering large quantities of unstructured visual or verbal information but appears to benefit when provided with additional structure.     Inder and his father completed questionnaires about his attentional, emotional, behavioral, and social functioning. His father reported clinically significant concerns about externalizing problems including aggression and conduct problems. He also reported mild concerns about hyperactivity, withdrawal, and depression in addition to mild concerns about leadership (e.g., difficulty making decisions) and activities of daily living (e.g., difficulty performing simple daily tasks in a safe and efficient manner). Inder reported mild concerns about internalizing problems, but he endorsed clinically significant concerns about anxiety (e.g., excessive worry, nervousness, tension). He reported mild concerns about attention problems and hyperactivity. Inder also reported mild concerns about his  locus of control.  Individuals with elevations on this scale may struggle with the belief that they do not have control over their circumstances. Inder endorsed mild concerns about social stress, which is common among those experiencing anxiety, and may reflect tense social interactions or poor social coping skills.    Diagnoses  F90.2 Attention Deficit Hyperactivity Disorder, combined type    Activity Date Minutes/Units   Diagnostic Interview [id number] 4/19/2021 1 unit         Review of previous records 4/19/2021 20 minutes   Case conceptualization/  test battery selection 6/1/2021 20 minutes   Integration,interpretation, treatment planning 6/1/2021 20 minutes   Feedback session TBD .. minutes   Report Writing 6/6/2021 120 minutes           Professional Time [id number] 6/4/2021 1 unit   Professional Time [id number] 6/4/2021 3 units         Testing and scoring  [id number] 6/4/2021 1 unit   Testing and scoring [id number] 6/4/2021 9 units   Neuropsych testing evaluation completed on 6/4/2021 by Kaycee Cummins MA, under my direct supervision. Our total time spent on evaluation = 4 hours. Neuropsych testing was administered and scored by Kaycee Cummins MA on 6/4/2021. Total time spent (including scoring) = 5 hours    I attest that I was present and supervised Ms. Cummins on this evaluation and I agree with the summary above.  Shola Hill, Ph.D., L.P.

## 2023-11-01 ENCOUNTER — TELEPHONE (OUTPATIENT)
Dept: PULMONOLOGY | Age: 78
End: 2023-11-01

## 2023-11-01 NOTE — TELEPHONE ENCOUNTER
Patient left a voicemail stating her new meds do not work at all. Patient is requesting a callback. I called patient back and she didn't answer. Left a v/m advising patient to call the office.

## 2023-11-03 NOTE — TELEPHONE ENCOUNTER
Called patient and relayed message from [facility] and she said she will call back for sooner if she ends up feeling like she needs to come in.

## 2023-11-21 ENCOUNTER — TELEPHONE (OUTPATIENT)
Dept: PULMONOLOGY | Age: 78
End: 2023-11-21

## 2023-11-21 NOTE — TELEPHONE ENCOUNTER
Received voicemail from Jerel Mosley at [facility] stating patient is there to pick something up but he is not sure what it is. Looks like patient was scheduled for over night pulse ox. Called and left message for her to call back and see if she ever showed up for overnight pulse ox. If not will need to reschedule.

## 2023-12-05 ENCOUNTER — HOSPITAL ENCOUNTER (OUTPATIENT)
Dept: RESPIRATORY THERAPY | Age: 78
Discharge: HOME | End: 2023-12-05
Payer: MEDICARE

## 2023-12-05 DIAGNOSIS — J44.9 STAGE 3 SEVERE COPD BY GOLD CLASSIFICATION (HCC): ICD-10-CM

## 2023-12-05 DIAGNOSIS — J96.11 CHRONIC RESPIRATORY FAILURE WITH HYPOXIA (HCC): ICD-10-CM

## 2023-12-05 PROCEDURE — 94762 N-INVAS EAR/PLS OXIMTRY CONT: CPT

## 2023-12-14 ENCOUNTER — TELEPHONE (OUTPATIENT)
Dept: PULMONOLOGY | Age: 78
End: 2023-12-14

## 2023-12-14 DIAGNOSIS — J44.9 STAGE 3 SEVERE COPD BY GOLD CLASSIFICATION (HCC): Primary | ICD-10-CM

## 2023-12-14 RX ORDER — FLUTICASONE PROPIONATE AND SALMETEROL XINAFOATE 230; 21 UG/1; UG/1
2 AEROSOL, METERED RESPIRATORY (INHALATION) 2 TIMES DAILY
Qty: 1 EACH | Refills: 11 | Status: SHIPPED | OUTPATIENT
Start: 2023-12-14

## 2023-12-14 NOTE — TELEPHONE ENCOUNTER
Patient called office this morning stating she does not think her Breztri inhaler is working for her. Patient stated she has given Breztri time to work and has even quit smoking on Thanksgiving of this year. Patient is requested a different inhaler to use in place of the Katelyn. She stated things worked better for her when she was using Advair and Incruse inhalers previously. Please advise, thank you!

## 2023-12-14 NOTE — TELEPHONE ENCOUNTER
Phoned patient to notify Zac Freedman stopped Jigar Singh and sent script for Advair and Incruse to [facility] in [facility]. Also informed patient that Sorin Chaney was unsure how much these inhalers would cost.  Informed patient if cost is too great to give office a call back. Patient expressed understanding and had no questions at this time.

## 2024-01-18 ENCOUNTER — HOSPITAL ENCOUNTER (EMERGENCY)
Age: 79
Discharge: HOME | End: 2024-01-18
Attending: EMERGENCY MEDICINE
Payer: MEDICARE

## 2024-01-18 ENCOUNTER — APPOINTMENT (OUTPATIENT)
Dept: CT IMAGING | Age: 79
End: 2024-01-18
Payer: MEDICARE

## 2024-01-18 VITALS
DIASTOLIC BLOOD PRESSURE: 78 MMHG | TEMPERATURE: 98.2 F | SYSTOLIC BLOOD PRESSURE: 146 MMHG | WEIGHT: 135 LBS | RESPIRATION RATE: 20 BRPM | HEART RATE: 96 BPM | HEIGHT: 64 IN | OXYGEN SATURATION: 94 % | BODY MASS INDEX: 23.05 KG/M2

## 2024-01-18 DIAGNOSIS — J18.9 PNEUMONIA OF RIGHT MIDDLE LOBE DUE TO INFECTIOUS ORGANISM: ICD-10-CM

## 2024-01-18 DIAGNOSIS — R10.10 PAIN OF UPPER ABDOMEN: Primary | ICD-10-CM

## 2024-01-18 LAB
ALBUMIN SERPL BCP-MCNC: 3.1 GM/DL (ref 3.4–5)
ALP SERPL-CCNC: 111 U/L (ref 46–116)
ALT SERPL W P-5'-P-CCNC: 18 U/L (ref 14–63)
AMORPH SED URNS QL MICRO: ABNORMAL
ANALYZED BY:: NORMAL
ANION GAP SERPL CALC-SCNC: 12 MEQ/L (ref 8–16)
AST SERPL W P-5'-P-CCNC: 16 U/L (ref 15–37)
BACTERIA URNS QL MICRO: ABNORMAL
BASOPHILS # BLD: 0.3 % (ref 0–3)
BASOPHILS ABSOLUTE: 0.1 THOU/MM3 (ref 0–0.1)
BILIRUB SERPL-MCNC: 0.5 MG/DL (ref 0.2–1)
BILIRUB UR QL STRIP.AUTO: ABNORMAL
BUN SERPL-MCNC: 16 MG/DL (ref 7–18)
CALCIUM SERPL-MCNC: 9.1 MG/DL (ref 8.5–10.1)
CASTS #/AREA URNS LPF: ABNORMAL /LPF
CHARACTER UR: ABNORMAL
CHLORIDE SERPL-SCNC: 95 MEQ/L (ref 98–107)
CO2 SERPL-SCNC: 26 MEQ/L (ref 21–32)
COLOR UR AUTO: YELLOW
CREAT SERPL-MCNC: 0.9 MG/DL (ref 0.6–1.3)
CRYSTALS VITF MICRO: ABNORMAL
DATE OF COLLECTION: NORMAL
DRAWN BY: NORMAL
EOSINOPHILS ABSOLUTE: 0 THOU/MM3 (ref 0–0.5)
EOSINOPHILS RELATIVE PERCENT: 0.2 % (ref 0–4)
EPI CELLS #/AREA URNS HPF: ABNORMAL /HPF
ETHANOL SERPL-MCNC: < 0.01 % (GM/DL)
FLUAV AG SPEC QL: NEGATIVE
FLUBV AG SPEC QL: NEGATIVE
GFR SERPL CREATININE-BSD FRML MDRD: > 60 ML/MIN/1.73M2
GLUCOSE SERPL-MCNC: 66 MG/DL (ref 74–106)
GLUCOSE UR QL STRIP.AUTO: NEGATIVE MG/DL
HCT VFR BLD CALC: 32.7 % (ref 37–47)
HEMOGLOBIN: 10.9 GM/DL (ref 12–16)
HGB UR STRIP.AUTO-MCNC: ABNORMAL MG/L
IMMATURE GRANS (ABS): 0.17 THOU/MM3 (ref 0–0.07)
IMMATURE GRANULOCYTES: 1 %
KETONES UR QL STRIP.AUTO: >= 80
LEUKOCYTE ESTERASE UR QL STRIP.AUTO: NEGATIVE
LIPASE SERPL-CCNC: 30 U/L (ref 16–77)
LYMPHOCYTES # BLD AUTO: 7.3 % (ref 15–47)
LYMPHOCYTES ABSOLUTE: 1.3 THOU/MM3 (ref 1–4.8)
Lab: 1354
Lab: NORMAL
MCH RBC QN AUTO: 31.5 PG (ref 26–32)
MCHC RBC AUTO-ENTMCNC: 33.3 GM/DL (ref 31–35)
MCV RBC AUTO: 94.5 FL (ref 81–99)
MONOCYTES: 1.3 THOU/MM3 (ref 0.3–1.3)
MONOCYTES: 7.3 % (ref 0–12)
MUCOUS THREADS URNS QL MICRO: ABNORMAL
NITRITE UR QL STRIP.AUTO: NEGATIVE
PDW BLD-RTO: 11.7 % (ref 11.5–14.9)
PH UR STRIP.AUTO: 6 [PH] (ref 5–9)
PLATELET # BLD AUTO: 379 THOU/MM3 (ref 130–400)
PMV BLD AUTO: 8.3 FL (ref 9.4–12.4)
POTASSIUM SERPL-SCNC: 4.3 MEQ/L (ref 3.5–5.1)
PROT SERPL-MCNC: 7.4 GM/DL (ref 6.4–8.2)
PROT UR STRIP.AUTO-MCNC: 30 MG/DL
RBC # BLD: 3.46 MILL/MM3 (ref 4.1–5.3)
RBC #/AREA URNS HPF: ABNORMAL /HPF
REFLEX TO URINE C & S: ABNORMAL
SARS-COV-2 RDRP RESP QL NAA+PROBE: NOT  DETECTED
SEG NEUTROPHILS: 83.7 % (ref 43–75)
SEGMENTED NEUTROPHILS ABSOLUTE COUNT: 14.4 THOU/MM3 (ref 1.8–7.7)
SODIUM SERPL-SCNC: 133 MEQ/L (ref 136–145)
SP GR UR STRIP.AUTO: 1.01 (ref 1–1.03)
UROBILINOGEN UR STRIP-ACNC: 0.2 EU/DL (ref 0–1)
WBC # BLD: 17.2 THOU/MM3 (ref 4.8–10.8)
WBC # UR STRIP.AUTO: ABNORMAL /HPF

## 2024-01-18 PROCEDURE — 6360000004 HC RX CONTRAST MEDICATION: Performed by: EMERGENCY MEDICINE

## 2024-01-18 PROCEDURE — 93005 ELECTROCARDIOGRAM TRACING: CPT | Performed by: EMERGENCY MEDICINE

## 2024-01-18 PROCEDURE — 87635 SARS-COV-2 COVID-19 AMP PRB: CPT

## 2024-01-18 PROCEDURE — 83690 ASSAY OF LIPASE: CPT

## 2024-01-18 PROCEDURE — 93010 ELECTROCARDIOGRAM REPORT: CPT | Performed by: NUCLEAR MEDICINE

## 2024-01-18 PROCEDURE — 81001 URINALYSIS AUTO W/SCOPE: CPT

## 2024-01-18 PROCEDURE — 74177 CT ABD & PELVIS W/CONTRAST: CPT

## 2024-01-18 PROCEDURE — 87804 INFLUENZA ASSAY W/OPTIC: CPT

## 2024-01-18 PROCEDURE — 82077 ASSAY SPEC XCP UR&BREATH IA: CPT

## 2024-01-18 PROCEDURE — 80053 COMPREHEN METABOLIC PANEL: CPT

## 2024-01-18 PROCEDURE — 99285 EMERGENCY DEPT VISIT HI MDM: CPT

## 2024-01-18 PROCEDURE — 85025 COMPLETE CBC W/AUTO DIFF WBC: CPT

## 2024-01-18 RX ORDER — ONDANSETRON 4 MG/1
4 TABLET, ORALLY DISINTEGRATING ORAL 3 TIMES DAILY PRN
Qty: 21 TABLET | Refills: 0 | Status: SHIPPED | OUTPATIENT
Start: 2024-01-18

## 2024-01-18 RX ORDER — CODEINE PHOSPHATE/GUAIFENESIN 10-100MG/5
5 LIQUID (ML) ORAL 3 TIMES DAILY PRN
Qty: 45 ML | Refills: 0 | Status: SHIPPED | OUTPATIENT
Start: 2024-01-18 | End: 2024-01-21

## 2024-01-18 RX ORDER — LEVOFLOXACIN 500 MG/1
500 TABLET, FILM COATED ORAL DAILY
Qty: 10 TABLET | Refills: 0 | Status: SHIPPED | OUTPATIENT
Start: 2024-01-18 | End: 2024-01-28

## 2024-01-18 RX ADMIN — IOPAMIDOL 100 ML: 755 INJECTION, SOLUTION INTRAVENOUS at 14:28

## 2024-01-18 ASSESSMENT — PAIN - FUNCTIONAL ASSESSMENT: PAIN_FUNCTIONAL_ASSESSMENT: NONE - DENIES PAIN

## 2024-01-18 NOTE — ED NOTES
Pt complains of abd pain, nausea, fatigue and shortness of breath at times. Pt denies fever, vomiting or diarrhea. Pt alert, resp even and unlabored, skin pale, warm and dry.

## 2024-01-18 NOTE — ED PROVIDER NOTES
[facility]  [address] [place] [id number]  Phone: [phone]  EMERGENCY DEPARTMENT ENCOUNTER      Pt Name: Caitlyn Fernandez  MRN: [id number]  Birthdate 1945  Date of evaluation: 1/18/2024  Provider: Raj Nguyen MD    CHIEF COMPLAINT       Chief Complaint   Patient presents with    Abdominal Pain         HISTORY OF PRESENT ILLNESS      Caitlyn Fernandez is a 78 y.o. female who presents to the emergency department with above-noted complaint.  Patient's been doing okay.  Over last 3 to 4 days has some upper abdominal pain discomfort decreased appetite.  No associate symptoms vomiting although just feels poorly.  Denies weakness syncope.        REVIEW OF SYSTEMS     Positive abdominal pain.  No high fever no cough or respiratory otherwise chronic COPD  Review of Systems  All systems negative except as marked.     PAST MEDICAL HISTORY     Past Medical History:   Diagnosis Date    Anemia     Dyslipidemia     HLD (hyperlipidemia)     HTN (hypertension)     Lower extremity edema     Membranous glomerulonephritis     Nephrotic syndrome     Proteinuria          SURGICAL HISTORY       History reviewed. No pertinent surgical history.      CURRENT MEDICATIONS       Previous Medications    ALBUTEROL (PROVENTIL) (2.5 MG/3ML) 0.083% NEBULIZER SOLUTION    Take 3 mLs by nebulization every 4 hours as needed for Wheezing or Shortness of Breath    ALBUTEROL SULFATE HFA (PROVENTIL;VENTOLIN;PROAIR) 108 (90 BASE) MCG/ACT INHALER    INHALE 2 PUFFS EVERY 6 HOURS AS NEEDED FOR WHEEZING.    AMLODIPINE (NORVASC) 5 MG TABLET    Take 1 tablet by mouth daily    CITALOPRAM (CELEXA) 20 MG TABLET    Take 1 tablet by mouth daily    FLUTICASONE-SALMETEROL (ADVAIR HFA) 230-21 MCG/ACT INHALER    Inhale 2 puffs into the lungs 2 times daily    FOLIC ACID (FOLVITE) 1 MG TABLET    Take 1 tablet by mouth daily    LORATADINE (CLARITIN) 10 MG TABLET    Take 1 tablet by mouth daily    LORAZEPAM (ATIVAN) 2 MG

## 2024-01-18 NOTE — DISCHARGE INSTRUCTIONS
[place] diet at home.  Monitor for shortness of breath high fever vomiting or other problems.  take Zofran for any nausea.  Take guaifenesin and codeine for cough.  Do not drive or operate machinery while taking codeine.  Codeine may help with your abdominal pain as well.  If you have continued pain decreased appetite or vomiting over the weekend please come back here or go to [facility] for further evaluation of your abdominal pain

## 2024-01-18 NOTE — ED NOTES
Pt resting, resp even and unlabored, skin pale, warm and dry. INT disocontinued and bandaid applied. Pt given discharge instructions and verbalizes understanding, pt released.

## 2024-01-18 NOTE — DISCHARGE INSTR - COC
Continuity of Care Form    Patient Name: Caitlyn Fernandez   :  1945  MRN:  [id number]    Admit date:  2024  Discharge date:  ***    Code Status Order: Prior   Advance Directives:     Admitting Physician:  No admitting provider for patient encounter.  PCP: Denise Liang, [place] - CNP    Discharging Nurse: ***  Discharging Hospital Unit/Room#: E1/E1  Discharging Unit Phone Number: ***    Emergency Contact:   Extended Emergency Contact Information  Primary Emergency Contact: Nicolasa Berry  Home Phone: [phone]  Mobile Phone: [phone]  Relation: Brother/Sister  Secondary Emergency Contact: olinda myers  Home Phone: [phone]  Relation: Brother/Sister  Preferred language: English   needed? No    Past Surgical History:  History reviewed. No pertinent surgical history.    Immunization History:   Immunization History   Administered Date(s) Administered    COVID-19, PFIZER PURPLE top, DILUTE for use, (age 12 y+), 30mcg/0.3mL 2021, 2021, 2021    Influenza, FLUARIX, FLULAVAL, FLUZONE (age 6 mo+) AND AFLURIA, (age 3 y+), PF, 0.5mL 2016, 10/16/2017, 10/08/2018, 10/22/2019, 2020    Pneumococcal, PCV-13, PREVNAR 13, (age 6w+), IM, 0.5mL 10/21/2014    TDaP, ADACEL (age 10y-64y), BOOSTRIX (age 10y+), IM, 0.5mL 2023    Zoster Recombinant (Shingrix) 2019, 10/23/2019, 2020       Active Problems:  Patient Active Problem List   Diagnosis Code    Nephrotic syndrome N04.9    HTN (hypertension) I10    Lower extremity edema R60.0    Membranous glomerulonephritis N05.2    Proteinuria R80.9    Anemia D64.9    Edema of both legs R60.0    Dyslipidemia E78.5    Hyponatremia E87.1    COPD exacerbation (HCC) J44.1    Nicotine abuse Z72.0    COPD (chronic obstructive pulmonary disease) (HCC) J44.9    Abnormal CT of the chest R93.89    Pneumonia of left upper lobe due to infectious organism J18.9       Isolation/Infection:   Isolation            No

## 2024-01-21 LAB
EKG ATRIAL RATE: 84 BPM
EKG P AXIS: 40 DEGREES
EKG P-R INTERVAL: 130 MS
EKG Q-T INTERVAL: 376 MS
EKG QRS DURATION: 84 MS
EKG QTC CALCULATION (BAZETT): 444 MS
EKG R AXIS: 35 DEGREES
EKG T AXIS: 47 DEGREES
EKG VENTRICULAR RATE: 84 BPM

## 2024-04-26 ENCOUNTER — OFFICE VISIT (OUTPATIENT)
Dept: PULMONOLOGY | Age: 79
End: 2024-04-26

## 2024-04-26 VITALS
DIASTOLIC BLOOD PRESSURE: 68 MMHG | OXYGEN SATURATION: 100 % | HEIGHT: 64 IN | BODY MASS INDEX: 21.37 KG/M2 | TEMPERATURE: 97.7 F | HEART RATE: 45 BPM | WEIGHT: 125.2 LBS | SYSTOLIC BLOOD PRESSURE: 118 MMHG

## 2024-04-26 DIAGNOSIS — Z72.0 NICOTINE ABUSE: ICD-10-CM

## 2024-04-26 DIAGNOSIS — J44.9 STAGE 3 SEVERE COPD BY GOLD CLASSIFICATION (HCC): Primary | ICD-10-CM

## 2024-04-26 DIAGNOSIS — Z87.891 PERSONAL HISTORY OF TOBACCO USE: ICD-10-CM

## 2024-04-26 RX ORDER — BUDESONIDE, GLYCOPYRROLATE, AND FORMOTEROL FUMARATE 160; 9; 4.8 UG/1; UG/1; UG/1
2 AEROSOL, METERED RESPIRATORY (INHALATION) 2 TIMES DAILY
Qty: 3 EACH | Refills: 3 | Status: SHIPPED | OUTPATIENT
Start: 2024-04-26

## 2024-04-26 RX ORDER — BUDESONIDE, GLYCOPYRROLATE, AND FORMOTEROL FUMARATE 160; 9; 4.8 UG/1; UG/1; UG/1
AEROSOL, METERED RESPIRATORY (INHALATION)
COMMUNITY
End: 2024-04-26 | Stop reason: SDUPTHER

## 2024-04-26 RX ORDER — HYDROXYZINE 50 MG/1
50 TABLET, FILM COATED ORAL 3 TIMES DAILY PRN
COMMUNITY

## 2024-04-26 RX ORDER — ALBUTEROL SULFATE 90 UG/1
2 AEROSOL, METERED RESPIRATORY (INHALATION) EVERY 4 HOURS PRN
Qty: 1 EACH | Refills: 3 | Status: SHIPPED | OUTPATIENT
Start: 2024-04-26

## 2024-04-26 ASSESSMENT — ENCOUNTER SYMPTOMS
WHEEZING: 1
SHORTNESS OF BREATH: 1
COUGH: 0
ALLERGIC/IMMUNOLOGIC NEGATIVE: 1
EYES NEGATIVE: 1
GASTROINTESTINAL NEGATIVE: 1

## 2024-04-26 NOTE — PROGRESS NOTES
motion.      Cervical back: Normal range of motion and neck supple.   Skin:     General: Skin is warm and dry.      Capillary Refill: Capillary refill takes less than 2 seconds.   Neurological:      Mental Status: She is alert and oriented to person, place, and time.   Psychiatric:         Behavior: Behavior normal.         Thought Content: Thought content normal.         Judgment: Judgment normal.          Results   Lung Nodule Screening     [] Qualifies    [x] Does not qualify   [] Declined    [x] Completed  - patient will turn 79 5/2024  The USPSTF recommends annual screening for lung cancer with low-dose computed tomography (LDCT) in adults aged 50 to 80 years who have a 30 pack-year smoking history and currently smoke or have quit within the past 20 years. Screening should be discontinued once a person has not smoked for 20 years or develops a health problem that substantially limits life expectancy or the ability or willingness to have curative lung surgery.     7/21/23  NONCONTRAST SCREENING CT CHEST:     HISTORY: History of smoking. 85.5 pack year history of smoking.     TECHNIQUE:   1 mm axial imaging of the chest and upper abdomen without IV contrast.      All CT scans at this facility use dose modulation, iterative reconstruction, and/or weight based dosing when appropriate to reduce the radiation dose to as low as reasonably achievable.     COMPARISON: Correlation with CTA 9/29/2023     FINDINGS:  LUNGS NODULES:  There are no suspicious masses or nodules.     LYMPHADENOPATHY:  There are no pathologically enlarged lymph nodes in the mediastinum, hilar or axillary regions.     OTHER (LUNGS/MEDIASTINUM/MUSCULOSKELETAL/ABDOMEN):  The heart is normal in size. There are coronary artery calcifications. There is atherosclerosis in the thoracic aorta without aneurysmal dilatation. There is no pleural or pericardial effusion. There are several old right-sided rib fractures resulting in   a right-sided chest wall

## 2024-04-30 ENCOUNTER — HOSPITAL ENCOUNTER (EMERGENCY)
Age: 79
Discharge: HOME | End: 2024-04-30
Attending: EMERGENCY MEDICINE
Payer: MEDICARE

## 2024-04-30 ENCOUNTER — APPOINTMENT (OUTPATIENT)
Dept: GENERAL RADIOLOGY | Age: 79
End: 2024-04-30
Payer: MEDICARE

## 2024-04-30 VITALS
BODY MASS INDEX: 21.17 KG/M2 | WEIGHT: 124 LBS | RESPIRATION RATE: 19 BRPM | TEMPERATURE: 98.6 F | SYSTOLIC BLOOD PRESSURE: 124 MMHG | DIASTOLIC BLOOD PRESSURE: 69 MMHG | HEIGHT: 64 IN | OXYGEN SATURATION: 94 % | HEART RATE: 81 BPM

## 2024-04-30 DIAGNOSIS — J44.1 COPD EXACERBATION (HCC): Primary | ICD-10-CM

## 2024-04-30 DIAGNOSIS — J18.9 PNEUMONIA OF BOTH LOWER LOBES DUE TO INFECTIOUS ORGANISM: ICD-10-CM

## 2024-04-30 LAB
ALBUMIN SERPL BCG-MCNC: 4.3 G/DL (ref 3.5–5.1)
ALP SERPL-CCNC: 102 U/L (ref 38–126)
ALT SERPL W/O P-5'-P-CCNC: 8 U/L (ref 11–66)
ANION GAP SERPL CALC-SCNC: 14 MEQ/L (ref 8–16)
AST SERPL-CCNC: 11 U/L (ref 5–40)
BASOPHILS ABSOLUTE: 0.1 THOU/MM3 (ref 0–0.1)
BASOPHILS NFR BLD AUTO: 0.5 %
BILIRUB CONJ SERPL-MCNC: < 0.2 MG/DL (ref 0–0.3)
BILIRUB SERPL-MCNC: 0.6 MG/DL (ref 0.3–1.2)
BUN SERPL-MCNC: 17 MG/DL (ref 7–22)
CALCIUM SERPL-MCNC: 9.5 MG/DL (ref 8.5–10.5)
CHLORIDE SERPL-SCNC: 98 MEQ/L (ref 98–111)
CO2 SERPL-SCNC: 22 MEQ/L (ref 23–33)
CREAT SERPL-MCNC: 0.8 MG/DL (ref 0.4–1.2)
DEPRECATED RDW RBC AUTO: 48.5 FL (ref 35–45)
EOSINOPHIL NFR BLD AUTO: 0.3 %
EOSINOPHILS ABSOLUTE: 0.1 THOU/MM3 (ref 0–0.4)
ERYTHROCYTE [DISTWIDTH] IN BLOOD BY AUTOMATED COUNT: 13.8 % (ref 11.5–14.5)
FLUAV RNA RESP QL NAA+PROBE: NOT DETECTED
FLUBV RNA RESP QL NAA+PROBE: NOT DETECTED
GFR SERPL CREATININE-BSD FRML MDRD: 74 ML/MIN/1.73M2
GLUCOSE SERPL-MCNC: 153 MG/DL (ref 70–108)
HCT VFR BLD AUTO: 34.9 % (ref 37–47)
HGB BLD-MCNC: 11.6 GM/DL (ref 12–16)
IMM GRANULOCYTES # BLD AUTO: 0.08 THOU/MM3 (ref 0–0.07)
IMM GRANULOCYTES NFR BLD AUTO: 0.5 %
LIPASE SERPL-CCNC: 23.8 U/L (ref 5.6–51.3)
LYMPHOCYTES ABSOLUTE: 1.4 THOU/MM3 (ref 1–4.8)
LYMPHOCYTES NFR BLD AUTO: 7.8 %
MAGNESIUM SERPL-MCNC: 1.9 MG/DL (ref 1.6–2.4)
MCH RBC QN AUTO: 31.9 PG (ref 26–33)
MCHC RBC AUTO-ENTMCNC: 33.2 GM/DL (ref 32.2–35.5)
MCV RBC AUTO: 95.9 FL (ref 81–99)
MONOCYTES ABSOLUTE: 1.7 THOU/MM3 (ref 0.4–1.3)
MONOCYTES NFR BLD AUTO: 9.8 %
NEUTROPHILS ABSOLUTE: 14.1 THOU/MM3 (ref 1.8–7.7)
NEUTROPHILS NFR BLD AUTO: 81.1 %
NRBC BLD AUTO-RTO: 0 /100 WBC
NT-PROBNP SERPL IA-MCNC: 1537 PG/ML (ref 0–449)
OSMOLALITY SERPL CALC.SUM OF ELEC: 272.8 MOSMOL/KG (ref 275–300)
PLATELET # BLD AUTO: 283 THOU/MM3 (ref 130–400)
PMV BLD AUTO: 8.9 FL (ref 9.4–12.4)
POTASSIUM SERPL-SCNC: 4 MEQ/L (ref 3.5–5.2)
PROT SERPL-MCNC: 7.4 G/DL (ref 6.1–8)
RBC # BLD AUTO: 3.64 MILL/MM3 (ref 4.2–5.4)
SARS-COV-2 RNA RESP QL NAA+PROBE: NOT DETECTED
SODIUM SERPL-SCNC: 134 MEQ/L (ref 135–145)
TROPONIN, HIGH SENSITIVITY: 28 NG/L (ref 0–12)
TROPONIN, HIGH SENSITIVITY: 32 NG/L (ref 0–12)
WBC # BLD AUTO: 17.4 THOU/MM3 (ref 4.8–10.8)

## 2024-04-30 PROCEDURE — 85025 COMPLETE CBC W/AUTO DIFF WBC: CPT

## 2024-04-30 PROCEDURE — 93005 ELECTROCARDIOGRAM TRACING: CPT | Performed by: EMERGENCY MEDICINE

## 2024-04-30 PROCEDURE — 6370000000 HC RX 637 (ALT 250 FOR IP): Performed by: EMERGENCY MEDICINE

## 2024-04-30 PROCEDURE — 80053 COMPREHEN METABOLIC PANEL: CPT

## 2024-04-30 PROCEDURE — 87636 SARSCOV2 & INF A&B AMP PRB: CPT

## 2024-04-30 PROCEDURE — 83880 ASSAY OF NATRIURETIC PEPTIDE: CPT

## 2024-04-30 PROCEDURE — 94640 AIRWAY INHALATION TREATMENT: CPT

## 2024-04-30 PROCEDURE — 82248 BILIRUBIN DIRECT: CPT

## 2024-04-30 PROCEDURE — 36415 COLL VENOUS BLD VENIPUNCTURE: CPT

## 2024-04-30 PROCEDURE — 83735 ASSAY OF MAGNESIUM: CPT

## 2024-04-30 PROCEDURE — 6360000002 HC RX W HCPCS: Performed by: EMERGENCY MEDICINE

## 2024-04-30 PROCEDURE — 71045 X-RAY EXAM CHEST 1 VIEW: CPT

## 2024-04-30 PROCEDURE — 2580000003 HC RX 258: Performed by: EMERGENCY MEDICINE

## 2024-04-30 PROCEDURE — 96374 THER/PROPH/DIAG INJ IV PUSH: CPT

## 2024-04-30 PROCEDURE — 84484 ASSAY OF TROPONIN QUANT: CPT

## 2024-04-30 PROCEDURE — 99285 EMERGENCY DEPT VISIT HI MDM: CPT

## 2024-04-30 PROCEDURE — 83690 ASSAY OF LIPASE: CPT

## 2024-04-30 RX ORDER — LEVOFLOXACIN 500 MG/1
500 TABLET, FILM COATED ORAL DAILY
Qty: 7 TABLET | Refills: 0 | Status: SHIPPED | OUTPATIENT
Start: 2024-04-30 | End: 2024-05-07

## 2024-04-30 RX ORDER — PREDNISONE 20 MG/1
20 TABLET ORAL DAILY
Qty: 10 TABLET | Refills: 0 | Status: SHIPPED | OUTPATIENT
Start: 2024-04-30 | End: 2024-05-10

## 2024-04-30 RX ORDER — IPRATROPIUM BROMIDE AND ALBUTEROL SULFATE 2.5; .5 MG/3ML; MG/3ML
2 SOLUTION RESPIRATORY (INHALATION) ONCE
Status: COMPLETED | OUTPATIENT
Start: 2024-04-30 | End: 2024-04-30

## 2024-04-30 RX ORDER — LEVOFLOXACIN 500 MG/1
500 TABLET, FILM COATED ORAL ONCE
Status: COMPLETED | OUTPATIENT
Start: 2024-04-30 | End: 2024-04-30

## 2024-04-30 RX ADMIN — IPRATROPIUM BROMIDE AND ALBUTEROL SULFATE 2 DOSE: .5; 3 SOLUTION RESPIRATORY (INHALATION) at 22:05

## 2024-04-30 RX ADMIN — LEVOFLOXACIN 500 MG: 500 TABLET, FILM COATED ORAL at 23:23

## 2024-04-30 RX ADMIN — WATER 60 MG: 1 INJECTION INTRAMUSCULAR; INTRAVENOUS; SUBCUTANEOUS at 23:46

## 2024-04-30 ASSESSMENT — ENCOUNTER SYMPTOMS
SHORTNESS OF BREATH: 1
EYE DISCHARGE: 0
EYE PAIN: 0
BLOOD IN STOOL: 0
STRIDOR: 0
SORE THROAT: 0
ABDOMINAL DISTENTION: 0
PHOTOPHOBIA: 0
ABDOMINAL PAIN: 0
DIARRHEA: 0
EYE ITCHING: 0
VOICE CHANGE: 0
CHOKING: 0
WHEEZING: 0
EYE REDNESS: 0
VOMITING: 0
BACK PAIN: 0
COUGH: 1
CONSTIPATION: 0
SINUS PRESSURE: 0
CHEST TIGHTNESS: 0
NAUSEA: 0
TROUBLE SWALLOWING: 0
RHINORRHEA: 0

## 2024-04-30 ASSESSMENT — PAIN - FUNCTIONAL ASSESSMENT: PAIN_FUNCTIONAL_ASSESSMENT: NONE - DENIES PAIN

## 2024-04-30 ASSESSMENT — PAIN SCALES - GENERAL: PAINLEVEL_OUTOF10: 0

## 2024-05-01 LAB
EKG ATRIAL RATE: 76 BPM
EKG P AXIS: 21 DEGREES
EKG P-R INTERVAL: 180 MS
EKG Q-T INTERVAL: 402 MS
EKG QRS DURATION: 86 MS
EKG QTC CALCULATION (BAZETT): 452 MS
EKG R AXIS: 18 DEGREES
EKG T AXIS: 53 DEGREES
EKG VENTRICULAR RATE: 76 BPM

## 2024-05-01 PROCEDURE — 93010 ELECTROCARDIOGRAM REPORT: CPT | Performed by: INTERNAL MEDICINE

## 2024-05-01 NOTE — ED NOTES
Pt and vs reassessed. RR easy and unlabored. Pt resting in bed alert and updated on POC. Pt denies any needs and stable at this time   : Yes

## 2024-05-01 NOTE — ED TRIAGE NOTES
Pt presents to the ED through lobby from home with c/o SOB. Pt states she has history of COPD and has had increased SOB. States she had a recent diagnosis of pneumonia and was following up with her PCP today. Pt states she had an xray done and was called by her PCP to come to the ED for concern of \"possible collapsed lung\". Pt states SOB is worse with ambulation. Pt denies SOB while at rest on cot. Denies chest pain. States she has been taking her breathing treatments at home as prescribed. States she has also been having increased fatigue. RR unlabored. EKG complete

## 2024-05-01 NOTE — ED NOTES
Pt and vs reassessed. RR unlabored. Pt resting in bed alert and medicated per MAR. Pt stable at this time

## 2024-05-01 NOTE — DISCHARGE INSTRUCTIONS
Patient has what appears to be a COPD exacerbation.  Patient is instructed to take all her medications as prescribed.  Patient also probably has an underlying pneumonia.  Patient has been given prescriptions for steroids and antibiotic she is instructed to take those as prescribed.  Patient is instructed to follow-up with her primary care physician and do so within the next 1 to 2 days.  Patient is instructed return to the nearest emergency room immediately for any new or worsening complaints

## 2024-05-01 NOTE — ED PROVIDER NOTES
[facility]  eMERGENCY dEPARTMENT eNCOUnter          CHIEF COMPLAINT       Chief Complaint   Patient presents with    Shortness of Breath       Nurses Notes reviewed and I agree except as noted in the HPI.      HISTORY OF PRESENT ILLNESS    Caitlyn Fernandez is a 78 y.o. female who presents for shortness of breath.  Patient has had a cough, it is been productive of clear sputum.  Patient states that she has had no fevers.  Patient states he recently got over pneumonia.  Patient states he has been using her medications as prescribed.  Patient denies any pain.  She has had some nausea without vomiting.  Denies any overt chest pain.  Patient has had no syncopal or presyncopal episodes.  Patient denies any swelling of the lower extremities.  Here today the patient states that the cough is intermittent, shortness of breath is all the time.  Here today she is hemodynamically stable between 94 and 97% on room air.  She has no conversational dyspnea.  Patient otherwise resting comfortably on the cot no apparent distress no other physical complaints at this time    REVIEW OF SYSTEMS     Review of Systems   Constitutional:  Negative for activity change, appetite change, diaphoresis, fatigue and unexpected weight change.   HENT:  Negative for congestion, ear discharge, ear pain, hearing loss, rhinorrhea, sinus pressure, sore throat, trouble swallowing and voice change.    Eyes:  Negative for photophobia, pain, discharge, redness and itching.   Respiratory:  Positive for cough and shortness of breath. Negative for choking, chest tightness, wheezing and stridor.    Cardiovascular:  Negative for chest pain, palpitations and leg swelling.   Gastrointestinal:  Negative for abdominal distention, abdominal pain, blood in stool, constipation, diarrhea, nausea and vomiting.   Endocrine: Negative for polydipsia, polyphagia and polyuria.   Genitourinary:  Negative for decreased urine volume, difficulty urinating,

## 2024-05-30 ENCOUNTER — APPOINTMENT (OUTPATIENT)
Dept: GENERAL RADIOLOGY | Age: 79
End: 2024-05-30
Attending: FAMILY MEDICINE
Payer: MEDICARE

## 2024-05-30 ENCOUNTER — HOSPITAL ENCOUNTER (EMERGENCY)
Age: 79
Discharge: HOME | End: 2024-05-30
Attending: FAMILY MEDICINE
Payer: MEDICARE

## 2024-05-30 VITALS
SYSTOLIC BLOOD PRESSURE: 173 MMHG | WEIGHT: 125 LBS | OXYGEN SATURATION: 95 % | BODY MASS INDEX: 21.46 KG/M2 | RESPIRATION RATE: 16 BRPM | DIASTOLIC BLOOD PRESSURE: 75 MMHG | HEART RATE: 78 BPM | TEMPERATURE: 98.5 F

## 2024-05-30 DIAGNOSIS — S83.92XA SPRAIN OF LEFT KNEE, UNSPECIFIED LIGAMENT, INITIAL ENCOUNTER: Primary | ICD-10-CM

## 2024-05-30 PROCEDURE — 99283 EMERGENCY DEPT VISIT LOW MDM: CPT

## 2024-05-30 PROCEDURE — 6370000000 HC RX 637 (ALT 250 FOR IP): Performed by: FAMILY MEDICINE

## 2024-05-30 PROCEDURE — 73564 X-RAY EXAM KNEE 4 OR MORE: CPT

## 2024-05-30 RX ORDER — IBUPROFEN 800 MG/1
800 TABLET ORAL ONCE
Status: COMPLETED | OUTPATIENT
Start: 2024-05-30 | End: 2024-05-30

## 2024-05-30 RX ORDER — IBUPROFEN 600 MG/1
600 TABLET ORAL EVERY 8 HOURS
Qty: 21 TABLET | Refills: 0 | Status: SHIPPED | OUTPATIENT
Start: 2024-05-30 | End: 2024-06-06

## 2024-05-30 RX ADMIN — IBUPROFEN 800 MG: 800 TABLET, FILM COATED ORAL at 07:46

## 2024-05-30 ASSESSMENT — PAIN SCALES - GENERAL
PAINLEVEL_OUTOF10: 5
PAINLEVEL_OUTOF10: 5

## 2024-05-30 ASSESSMENT — LIFESTYLE VARIABLES
HOW MANY STANDARD DRINKS CONTAINING ALCOHOL DO YOU HAVE ON A TYPICAL DAY: PATIENT DOES NOT DRINK
HOW OFTEN DO YOU HAVE A DRINK CONTAINING ALCOHOL: NEVER

## 2024-05-30 ASSESSMENT — PAIN - FUNCTIONAL ASSESSMENT: PAIN_FUNCTIONAL_ASSESSMENT: 0-10

## 2024-05-30 NOTE — ED PROVIDER NOTES
[facility]  eMERGENCY dEPARTMENT eNCOUnter          CHIEF COMPLAINT       Chief Complaint   Patient presents with    Knee Pain     left       Nurses Notes reviewed and I agree except as noted in the HPI.    HISTORY OF PRESENT ILLNESS    Caitlyn Fernandez is a 79 y.o. female who presents to the Emergency Department for the evaluation of knee pain.  Patient says that yesterday she was getting up from the dining table and she heard a snap in her left knee.  She reports she was doing okay but this morning when she woke up she was having difficulty ambulating because of the pain.  She rates her pain as 5 out of 10 in intensity.  Denies any significant swelling.  Denies any numbness or tingling.  Denies any hip pain.  She has a history of nephrotic syndrome and glomerulonephritis.      The HPI was provided by the patient.     REVIEW OF SYSTEMS       Eyes: no vision changes  Ears, Nose, Mouth, Throat: no hearing disturbance, no nasal swelling, no epistaxis, no difficulty swallowing  Cardiovascular: no chest pains  Respiratory: no SOB, no cough  Gastrointestinal: no abdominal pain, no nausea, no vomiting, no diarrhea  Genitourinary: no change in urinary frequency, no dysuria symptoms  Musculoskeletal: + knee joint pain, no muscle pains  Integumentary (skin and/or breast): no rashes, no swelling, no redness  Neurological: no weakness, no facial droop, no confusion  Psychiatric: no depression, no anxiety    MEDICAL HISTORY    has a past medical history of Anemia, Dyslipidemia, HLD (hyperlipidemia), HTN (hypertension), Lower extremity edema, Membranous glomerulonephritis, Nephrotic syndrome, and Proteinuria.    SURGICAL HISTORY      has no past surgical history on file.    CURRENT MEDICATIONS       Previous Medications    ALBUTEROL (PROVENTIL) (2.5 MG/3ML) 0.083% NEBULIZER SOLUTION    Take 3 mLs by nebulization every 4 hours as needed for Wheezing or Shortness of Breath    ALBUTEROL SULFATE HFA

## 2024-05-30 NOTE — ED NOTES
Written discharge instructions given along with folow up advice and new medication information. Pt and family verbalizing understanding. Per pt request, [facility] information given.

## 2024-05-30 NOTE — DISCHARGE INSTRUCTIONS
Caitlyn Fernandez,    It has been my absolute pleasure to serve you while in the emergency department at [facility] today.  Please do remember to take all medications as prescribed.  Please make sure you follow-up with your PCP within 7 days.  Please follow-up with any and all specialists as we discussed.  Please return to the ER in case of any worsening of symptoms.  I wish you a speedy recovery!    Sincerely,    Dr. Steven Esparza MD.

## 2024-05-30 NOTE — ED NOTES
Pt arrives to ER with daughter. Pt went from a sitting to a standing position on 5/29/24 and felt a \"crack\" in her left knee. Was able to walk with a walker. No open areas or deformity noted

## 2024-08-06 DIAGNOSIS — J44.9 STAGE 3 SEVERE COPD BY GOLD CLASSIFICATION (HCC): ICD-10-CM

## 2024-08-06 NOTE — TELEPHONE ENCOUNTER
Received refill request for Albuterol. Medication was last ordered by Malathi Talbert. Medication was last ordered on 4/26/24 with 3 refills.     Received refill request for Breztri. Medication was last ordered by 4/26/24. Medication was last ordered on 4/26/24 with 3 refills.     Patient was last seen in the office 4/24/24. Patient has a scheduled follow up 4/22/25.   Medication needs to be sent to [facility].

## 2024-08-07 RX ORDER — ALBUTEROL SULFATE 90 UG/1
AEROSOL, METERED RESPIRATORY (INHALATION)
Qty: 8.5 EACH | Refills: 3 | Status: SHIPPED | OUTPATIENT
Start: 2024-08-07

## 2024-08-07 RX ORDER — BUDESONIDE, GLYCOPYRROLATE, AND FORMOTEROL FUMARATE 160; 9; 4.8 UG/1; UG/1; UG/1
AEROSOL, METERED RESPIRATORY (INHALATION)
Qty: 10.7 EACH | Refills: 3 | Status: SHIPPED | OUTPATIENT
Start: 2024-08-07

## 2025-01-27 ENCOUNTER — APPOINTMENT (OUTPATIENT)
Dept: CT IMAGING | Age: 80
End: 2025-01-27
Attending: EMERGENCY MEDICINE
Payer: MEDICARE

## 2025-01-27 ENCOUNTER — HOSPITAL ENCOUNTER (EMERGENCY)
Age: 80
Discharge: HOME | End: 2025-01-27
Attending: EMERGENCY MEDICINE
Payer: MEDICARE

## 2025-01-27 VITALS
OXYGEN SATURATION: 95 % | SYSTOLIC BLOOD PRESSURE: 191 MMHG | HEART RATE: 65 BPM | RESPIRATION RATE: 19 BRPM | DIASTOLIC BLOOD PRESSURE: 79 MMHG | TEMPERATURE: 98.4 F

## 2025-01-27 DIAGNOSIS — R42 DIZZINESS: Primary | ICD-10-CM

## 2025-01-27 DIAGNOSIS — I10 EPISODE OF HYPERTENSION: ICD-10-CM

## 2025-01-27 LAB
ALBUMIN SERPL BCP-MCNC: 3.8 GM/DL (ref 3.4–5)
ALP SERPL-CCNC: 101 U/L (ref 46–116)
ALT SERPL W P-5'-P-CCNC: 18 U/L (ref 14–63)
ANION GAP SERPL CALC-SCNC: 8 MEQ/L (ref 8–16)
AST SERPL W P-5'-P-CCNC: 16 U/L (ref 15–37)
BASOPHILS # BLD: 0.9 % (ref 0–3)
BASOPHILS ABSOLUTE: 0.1 THOU/MM3 (ref 0–0.1)
BILIRUB SERPL-MCNC: 0.4 MG/DL (ref 0.2–1)
BUN SERPL-MCNC: 28 MG/DL (ref 7–18)
CALCIUM SERPL-MCNC: 8.7 MG/DL (ref 8.5–10.1)
CHLORIDE SERPL-SCNC: 101 MEQ/L (ref 98–107)
CO2 SERPL-SCNC: 30 MEQ/L (ref 21–32)
CREAT SERPL-MCNC: 0.9 MG/DL (ref 0.6–1.3)
EKG ATRIAL RATE: 74 BPM
EKG P AXIS: 70 DEGREES
EKG P-R INTERVAL: 208 MS
EKG Q-T INTERVAL: 388 MS
EKG QRS DURATION: 90 MS
EKG QTC CALCULATION (BAZETT): 430 MS
EKG R AXIS: 41 DEGREES
EKG T AXIS: 76 DEGREES
EKG VENTRICULAR RATE: 74 BPM
EOSINOPHILS ABSOLUTE: 0.3 THOU/MM3 (ref 0–0.5)
EOSINOPHILS RELATIVE PERCENT: 3.2 % (ref 0–4)
GFR SERPL CREATININE-BSD FRML MDRD: 64 ML/MIN/1.73M2
GLUCOSE SERPL-MCNC: 107 MG/DL (ref 74–106)
HCT VFR BLD CALC: 35.4 % (ref 37–47)
HEMOGLOBIN: 11.7 GM/DL (ref 12–16)
IMMATURE GRANS (ABS): 0 THOU/MM3 (ref 0–0.07)
IMMATURE GRANULOCYTES %: 0 %
LYMPHOCYTES # BLD AUTO: 18.6 % (ref 15–47)
LYMPHOCYTES ABSOLUTE: 1.5 THOU/MM3 (ref 1–4.8)
MCH RBC QN AUTO: 28.8 PG (ref 26–32)
MCHC RBC AUTO-ENTMCNC: 33.1 GM/DL (ref 31–35)
MCV RBC AUTO: 87.2 FL (ref 81–99)
MONOCYTES: 0.9 THOU/MM3 (ref 0.3–1.3)
MONOCYTES: 10.7 % (ref 0–12)
NEUTROPHILS ABSOLUTE: 5.4 THOU/MM3 (ref 1.8–7.7)
NT PRO BNP: 864 PG/ML (ref 0–450)
PDW BLD-RTO: 12.5 % (ref 11.5–14.9)
PLATELET # BLD AUTO: 295 THOU/MM3 (ref 130–400)
PMV BLD AUTO: 8.3 FL (ref 9.4–12.4)
POTASSIUM SERPL-SCNC: 3.7 MEQ/L (ref 3.5–5.1)
PROT SERPL-MCNC: 7.3 GM/DL (ref 6.4–8.2)
RBC # BLD: 4.06 MILL/MM3 (ref 4.1–5.3)
SEG NEUTROPHILS: 66.4 % (ref 43–75)
SODIUM SERPL-SCNC: 139 MEQ/L (ref 136–145)
TROPONIN, HIGH SENSITIVITY: 8.8 PG/ML (ref 0–51.3)
WBC # BLD: 8.1 THOU/MM3 (ref 4.8–10.8)

## 2025-01-27 PROCEDURE — 83880 ASSAY OF NATRIURETIC PEPTIDE: CPT

## 2025-01-27 PROCEDURE — 93005 ELECTROCARDIOGRAM TRACING: CPT | Performed by: EMERGENCY MEDICINE

## 2025-01-27 PROCEDURE — 85025 COMPLETE CBC W/AUTO DIFF WBC: CPT

## 2025-01-27 PROCEDURE — 6370000000 HC RX 637 (ALT 250 FOR IP): Performed by: EMERGENCY MEDICINE

## 2025-01-27 PROCEDURE — 70450 CT HEAD/BRAIN W/O DYE: CPT

## 2025-01-27 PROCEDURE — 84484 ASSAY OF TROPONIN QUANT: CPT

## 2025-01-27 PROCEDURE — 99284 EMERGENCY DEPT VISIT MOD MDM: CPT

## 2025-01-27 PROCEDURE — 80053 COMPREHEN METABOLIC PANEL: CPT

## 2025-01-27 RX ORDER — AMLODIPINE BESYLATE 5 MG/1
10 TABLET ORAL DAILY
Qty: 30 TABLET | Refills: 0 | Status: SHIPPED | OUTPATIENT
Start: 2025-01-27

## 2025-01-27 RX ORDER — CLONIDINE HYDROCHLORIDE 0.1 MG/1
0.1 TABLET ORAL ONCE
Status: COMPLETED | OUTPATIENT
Start: 2025-01-27 | End: 2025-01-27

## 2025-01-27 RX ADMIN — CLONIDINE HYDROCHLORIDE 0.1 MG: 0.1 TABLET ORAL at 10:22

## 2025-01-27 ASSESSMENT — PAIN - FUNCTIONAL ASSESSMENT: PAIN_FUNCTIONAL_ASSESSMENT: NONE - DENIES PAIN

## 2025-01-27 NOTE — ED TRIAGE NOTES
Pt reports this AM she has suffered from continuous dizziness and hypertension. Denies any increase shortness of breath.

## 2025-01-27 NOTE — ED PROVIDER NOTES
[facility] Emergency Department  [address]  [facility] [address]  Phone: [phone]  EMERGENCY DEPARTMENT ENCOUNTER      Pt Name: Caitlyn Fernandez  MRN: [id number]  Birthdate 1945  Date of evaluation: 1/27/2025  Provider: Raj Nguyen MD    CHIEF COMPLAINT       Chief Complaint   Patient presents with    Hypertension    Dizziness         HISTORY OF PRESENT ILLNESS      Caitlyn Fernandez is a 79 y.o. female who presents to the emergency department with above noted complaint.  Patient been doing okay.  She has some dizzy episodes.  She rather vague in a less fleeting.  No associate symptoms such as high fever chills neck pain vomiting abdominal pain urinary symptoms or other problems.        REVIEW OF SYSTEMS     Reported sporadic dizziness.  No headache no fall no weakness  Review of Systems  All systems negative except as marked.     PAST MEDICAL HISTORY     Past Medical History:   Diagnosis Date    Anemia     COPD (chronic obstructive pulmonary disease) (HCC)     Dyslipidemia     HLD (hyperlipidemia)     HTN (hypertension)     Lower extremity edema     Membranous glomerulonephritis     Nephrotic syndrome     Proteinuria          SURGICAL HISTORY       History reviewed. No pertinent surgical history.      CURRENT MEDICATIONS       Current Discharge Medication List        CONTINUE these medications which have NOT CHANGED    Details   albuterol sulfate HFA (PROVENTIL;VENTOLIN;PROAIR) 108 (90 Base) MCG/ACT inhaler INHALE 2 PUFFS EVERY 4 HOURS AS NEEDED FOR WHEEZING.  Qty: 8.5 each, Refills: 3    Associated Diagnoses: Stage 3 severe COPD by GOLD classification ([facility])      BREZTRI AEROSPHERE 160-9-4.8 MCG/ACT AERO INHALE 2 PUFFS TWICE DAILY, BY MOUTH.  Qty: 10.7 each, Refills: 3    Associated Diagnoses: Stage 3 severe COPD by GOLD classification ([facility])      ibuprofen (ADVIL;MOTRIN) 600 MG tablet Take 1 tablet by mouth every 8 (eight) hours for 7 days  Qty: 21 tablet, Refills: 0

## 2025-01-27 NOTE — DISCHARGE INSTR - COC
Continuity of Care Form    Patient Name: Caitlyn Fernandez   :  1945  MRN:  [id number]    Admit date:  2025  Discharge date:  ***    Code Status Order: Prior   Advance Directives:   Advance Care Flowsheet Documentation             Admitting Physician:  No admitting provider for patient encounter.  PCP: Denise Liang, [place] - CNP    Discharging Nurse: ***  Discharging Hospital Unit/Room#: 2TR/TR2  Discharging Unit Phone Number: ***    Emergency Contact:   Extended Emergency Contact Information  Primary Emergency Contact: Nicolasa Berry  Home Phone: [phone]  Mobile Phone: [phone]  Relation: Brother/Sister  Secondary Emergency Contact: olinda myers  Home Phone: [phone]  Relation: Brother/Sister  Preferred language: English   needed? No    Past Surgical History:  History reviewed. No pertinent surgical history.    Immunization History:   Immunization History   Administered Date(s) Administered    COVID-19, PFIZER PURPLE top, DILUTE for use, (age 12 y+), 30mcg/0.3mL 2021, 2021, 2021    Influenza, FLUARIX, FLULAVAL, FLUZONE (age 6 mo+) and AFLURIA, (age 3 y+), Quadv PF, 0.5mL 2016, 10/16/2017, 10/08/2018, 10/22/2019, 2020    Pneumococcal, PCV-13, PREVNAR 13, (age 6w+), IM, 0.5mL 10/21/2014    TDaP, ADACEL (age 10y-64y), BOOSTRIX (age 10y+), IM, 0.5mL 2023    Zoster Recombinant (Shingrix) 2019, 10/23/2019, 2020       Active Problems:  Patient Active Problem List   Diagnosis Code    Nephrotic syndrome N04.9    HTN (hypertension) I10    Lower extremity edema R60.0    Membranous glomerulonephritis N05.2    Proteinuria R80.9    Anemia D64.9    Edema of both legs R60.0    Dyslipidemia E78.5    Hyponatremia E87.1    COPD exacerbation (HCC) J44.1    Nicotine abuse Z72.0    COPD (chronic obstructive pulmonary disease) (HCC) J44.9    Abnormal CT of the chest R93.89    Pneumonia of left upper lobe due to infectious organism J18.9

## 2025-01-27 NOTE — DISCHARGE INSTRUCTIONS
Take an extra dose of your amlodipine/Norvasc when you get home.  This will help drop her pressure.  Then take a total amlodipine dose of 10 mg a day starting tomorrow morning.  Have a blood pressure check with your primary care doctor and call your doctor today to discuss further plan and follow-up and medication adjustments.

## 2025-02-15 ENCOUNTER — APPOINTMENT (OUTPATIENT)
Dept: CT IMAGING | Age: 80
End: 2025-02-15
Payer: MEDICARE

## 2025-02-15 ENCOUNTER — HOSPITAL ENCOUNTER (EMERGENCY)
Age: 80
Discharge: HOME | End: 2025-02-15
Attending: FAMILY MEDICINE
Payer: MEDICARE

## 2025-02-15 VITALS
WEIGHT: 120 LBS | OXYGEN SATURATION: 95 % | DIASTOLIC BLOOD PRESSURE: 68 MMHG | HEIGHT: 64 IN | BODY MASS INDEX: 20.49 KG/M2 | RESPIRATION RATE: 14 BRPM | TEMPERATURE: 97.2 F | SYSTOLIC BLOOD PRESSURE: 147 MMHG | HEART RATE: 74 BPM

## 2025-02-15 DIAGNOSIS — S00.03XA HEMATOMA OF SCALP, INITIAL ENCOUNTER: ICD-10-CM
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
## 2025-02-15 DIAGNOSIS — S09.90XA INJURY OF HEAD, INITIAL ENCOUNTER: Primary | ICD-10-CM

## 2025-02-15 PROCEDURE — 99284 EMERGENCY DEPT VISIT MOD MDM: CPT

## 2025-02-15 PROCEDURE — 70450 CT HEAD/BRAIN W/O DYE: CPT

## 2025-02-15 PROCEDURE — 72125 CT NECK SPINE W/O DYE: CPT

## 2025-02-15 ASSESSMENT — LIFESTYLE VARIABLES: HOW OFTEN DO YOU HAVE A DRINK CONTAINING ALCOHOL: NEVER

## 2025-02-15 ASSESSMENT — ENCOUNTER SYMPTOMS
ABDOMINAL PAIN: 0
COUGH: 0
VOMITING: 0
NAUSEA: 0
SHORTNESS OF BREATH: 0

## 2025-02-15 ASSESSMENT — PAIN - FUNCTIONAL ASSESSMENT
PAIN_FUNCTIONAL_ASSESSMENT: NONE - DENIES PAIN
PAIN_FUNCTIONAL_ASSESSMENT: NONE - DENIES PAIN

## 2025-02-15 NOTE — DISCHARGE INSTRUCTIONS
Ice to scalp. Tylenol for pain. Return if recurrent vomiting,alteration in mental status. Follow up with PCP recommended.

## 2025-02-15 NOTE — ED PROVIDER NOTES
[facility]  eMERGENCY dEPARTMENT eNCOUnter          CHIEF COMPLAINT       Chief Complaint   Patient presents with    Fall    Head Injury       Nurses Notes reviewed and I agree except as noted in the HPI.      HISTORY OF PRESENT ILLNESS    Caitlyn Fernandez is a 79 y.o. female who presents after fall getting out of bed. Patient denies LOC. Patient denies extremity pain. Denies hip pain. Patient denies neck pain. No vomiting.          REVIEW OF SYSTEMS     Review of Systems   Constitutional:  Negative for chills and fever.   Respiratory:  Negative for cough and shortness of breath.    Cardiovascular:  Negative for chest pain and palpitations.   Gastrointestinal:  Negative for abdominal pain, nausea and vomiting.   Musculoskeletal:  Negative for arthralgias, neck pain and neck stiffness.   Hematological:  Does not bruise/bleed easily.   Psychiatric/Behavioral:  Negative for agitation and behavioral problems.    All other systems reviewed and are negative.        PAST MEDICAL HISTORY    has a past medical history of Anemia, COPD (chronic obstructive pulmonary disease) (HCC), Dyslipidemia, HLD (hyperlipidemia), HTN (hypertension), Lower extremity edema, Membranous glomerulonephritis, Nephrotic syndrome, and Proteinuria.    SURGICAL HISTORY      has no past surgical history on file.    CURRENT MEDICATIONS       Previous Medications    ALBUTEROL (PROVENTIL) (2.5 MG/3ML) 0.083% NEBULIZER SOLUTION    Take 3 mLs by nebulization every 4 hours as needed for Wheezing or Shortness of Breath    ALBUTEROL SULFATE HFA (PROVENTIL;VENTOLIN;PROAIR) 108 (90 BASE) MCG/ACT INHALER    INHALE 2 PUFFS EVERY 4 HOURS AS NEEDED FOR WHEEZING.    AMLODIPINE (NORVASC) 5 MG TABLET    Take 2 tablets by mouth daily    BREZTRI AEROSPHERE 160-9-4.8 MCG/ACT AERO    INHALE 2 PUFFS TWICE DAILY, BY MOUTH.    FOLIC ACID (FOLVITE) 1 MG TABLET    Take 1 tablet by mouth daily    HYDROXYZINE HCL (ATARAX) 50 MG TABLET    Take 1 tablet by mouth  3 times daily as needed for Itching    IBUPROFEN (ADVIL;MOTRIN) 600 MG TABLET    Take 1 tablet by mouth every 8 (eight) hours for 7 days    LORAZEPAM (ATIVAN) 2 MG TABLET    Take 1 tablet by mouth every 6 hours as needed for Anxiety.    LOSARTAN (COZAAR) 25 MG TABLET    Take 1 tablet by mouth daily    METOPROLOL (LOPRESSOR) 25 MG TABLET    Take 2 tablets by mouth 2 times daily    OXYGEN    Inhale into the lungs 3 liters at night    SERTRALINE (ZOLOFT) 100 MG TABLET    Take 1 tablet by mouth daily       ALLERGIES     is allergic to amoxicillin-pot clavulanate, sulfa antibiotics, and bee venom.    FAMILY HISTORY     has no family status information on file.    family history is not on file.    SOCIAL HISTORY      reports that she has been smoking cigarettes. She started smoking about 60 years ago. She has a 90.2 pack-year smoking history. She has never used smokeless tobacco. She reports current alcohol use. She reports that she does not use drugs.    PHYSICAL EXAM     INITIAL VITALS:  height is 1.626 m (5' 4\") and weight is 54.4 kg (120 lb). Her temporal temperature is 97.2 °F (36.2 °C). Her blood pressure is 147/68 (abnormal) and her pulse is 74. Her respiration is 14 and oxygen saturation is 95%.    Physical Exam  Vitals and nursing note reviewed.   HENT:      Head:      Comments: Small cuts noted to left scalp area in parietal area. Hematoma noted to left parietal area just above left ear.      Right Ear: Tympanic membrane normal.      Left Ear: Tympanic membrane normal.      Nose: Nose normal.      Mouth/Throat:      Mouth: Mucous membranes are moist.      Pharynx: Oropharynx is clear.   Eyes:      Extraocular Movements: Extraocular movements intact.      Conjunctiva/sclera: Conjunctivae normal.      Pupils: Pupils are equal, round, and reactive to light.   Musculoskeletal:         General: No swelling, tenderness or deformity. Normal range of motion.      Cervical back: Normal range of motion and neck supple. No

## 2025-02-15 NOTE — ED NOTES
Left scalp 2 superficial lacerations noted. Cleansed w/ wound wash. No active bleeding noted. Bruising and hematoma noted to left temporal and parietal region.

## 2025-02-15 NOTE — ED NOTES
Pt alert and oriented.  Discharge instructions reviewed. States understanding. Pt discharged in satisfactory condition.

## 2025-02-15 NOTE — ED NOTES
Pt presents to ED per EMS w/ left temporal and parietal scrapes/ laceration and head injury d/t a fall. States that she fell getting out of bed and hit her head on her night stand. No loss of consciousness. No neuro deficits noted. Respirations regular and easy at this time. Audible wheezing noted, reported chronic. No distress noted.

## 2025-02-21 DIAGNOSIS — J44.9 STAGE 3 SEVERE COPD BY GOLD CLASSIFICATION (HCC): ICD-10-CM

## 2025-02-21 RX ORDER — BUDESONIDE, GLYCOPYRROLATE, AND FORMOTEROL FUMARATE 160; 9; 4.8 UG/1; UG/1; UG/1
2 AEROSOL, METERED RESPIRATORY (INHALATION) 2 TIMES DAILY
Qty: 10.7 EACH | Refills: 3 | Status: SHIPPED | OUTPATIENT
Start: 2025-02-21

## 2025-02-21 NOTE — TELEPHONE ENCOUNTER
Received refill request for BREZTRI AEROSPHERE 160-9-4.8 MCG/ACT AERO.   Medication was last ordered by Malathi Talbert.   Medication was last ordered on 8/7/24 with 3 refills.     Patient was last seen in the office 4/26/24 by Malathi Talbert.   Does patient have a scheduled follow up?: yes - 4/23/25 with Kandi Conklin.    Medication needs to be sent to [facility] - [facility] [address] [facility] -  [phone] - F [phone]  [address] [place] [address]  Phone: [phone]  Fax: [phone].     **VERIFIED WITH BRY AT [facility] THAT PATIENT NEEDS NEW SCRIPT FOR MEDICATION.   Thank you, please advise!    Patient's Allergies:  Allergies   Allergen Reactions    Amoxicillin-Pot Clavulanate     Sulfa Antibiotics     Bee Venom Hives

## 2025-03-07 ENCOUNTER — TELEPHONE (OUTPATIENT)
Dept: PULMONOLOGY | Age: 80
End: 2025-03-07

## 2025-03-07 NOTE — TELEPHONE ENCOUNTER
Patient called in this morning wanting to verify her appt date in April. She also stated she was out of her Advair and was needing refills. I informed patient she was prescribed Breztri instead of the Advair . She stated she has been taking BOTH the Breztri and Advair together daily. I advised the patient to stop taking the Advair , she should only be using the Breztri as her daily maintenance inhaler and the albuterol prn . She verbalized her understanding.

## 2025-03-11 DIAGNOSIS — J44.9 STAGE 3 SEVERE COPD BY GOLD CLASSIFICATION (HCC): ICD-10-CM

## 2025-03-11 RX ORDER — ALBUTEROL SULFATE 90 UG/1
1-2 INHALANT RESPIRATORY (INHALATION) EVERY 4 HOURS PRN
Qty: 8.5 EACH | Refills: 3 | Status: SHIPPED | OUTPATIENT
Start: 2025-03-11

## 2025-03-11 NOTE — TELEPHONE ENCOUNTER
Received refill request for Albuterol Sulfate.   Medication was last ordered by Malathi.   Medication was last ordered on 8/7/24 with 3 refills.     Patient was last seen in the office 4/26/24.   Does patient have a scheduled follow up?: yes - 4/23/25    Medication needs to be sent to [facility]. I called into [facility] to check on this refill and they stated the patient does need the refill she doesn't have any.     Thank you, please advise!    Patient's Allergies:  Allergies   Allergen Reactions    Amoxicillin-Pot Clavulanate     Sulfa Antibiotics     Bee Venom Hives

## 2025-04-08 ENCOUNTER — TELEPHONE (OUTPATIENT)
Dept: PULMONOLOGY | Age: 80
End: 2025-04-08

## 2025-04-08 NOTE — PROGRESS NOTES
[facility]    Patient: TERRY SMITH, 79 y.o.   : 1945  2025    Patient of Dr. Sorensen    Assessment/Plan     Assessment & Plan      1. Stage 3 severe COPD by GOLD classification (HCC)  Her frequent use of albuterol, approximately 4 times daily, suggests suboptimal control of her COPD. Her oxygen levels were slightly lower than usual during this visit. The last spirometry test was conducted in , and a repeat test is recommended before her next visit in  to assess any changes in her COPD stage. She was advised to continue using Breztri inhaler 2 puffs twice a day and albuterol as needed every 4-6 hours for shortness of breath or wheezing. A spacer was recommended for use with the Breztri inhaler to ensure proper medication delivery, and instructions on cleaning the spacer were provided. A walking test will be conducted today to evaluate her oxygen levels. She was advised to use oxygen during naps and nighttime sleep.   The potential benefits of the RSV vaccine were discussed, but she declined at this time.   If she develops a chest cold or upper respiratory virus, Mucinex can be used to manage mucus production.  - albuterol (PROVENTIL) (2.5 MG/3ML) 0.083% nebulizer solution; Take 3 mLs by nebulization every 4 hours as needed for Wheezing or Shortness of Breath  Dispense: 225 mL; Refill: 11  - albuterol sulfate HFA (PROVENTIL;VENTOLIN;PROAIR) 108 (90 Base) MCG/ACT inhaler; Inhale 1-2 puffs into the lungs every 4 hours as needed for Wheezing or Shortness of Breath  Dispense: 8.5 each; Refill: 11  - Budeson-Glycopyrrol-Formoterol (BREZTRI AEROSPHERE) 160-9-4.8 MCG/ACT AERO; Inhale 2 puffs into the lungs in the morning and at bedtime  Dispense: 10.7 each; Refill: 11  -6 Minute Walk Test - Did not qualify for supplemental O2   -Spacer/Aero-Holding Chambers ESTEBAN; 1 Device by Does not apply route daily  Dispense: 1 each; Refill: 0    2. Chronic respiratory

## 2025-04-08 NOTE — TELEPHONE ENCOUNTER
Pt is coming in Monday. We received an O2 renewal from [facility], but Eyal stated that the patient does not qualify for oxygen.

## 2025-04-14 ENCOUNTER — OFFICE VISIT (OUTPATIENT)
Dept: PULMONOLOGY | Age: 80
End: 2025-04-14
Payer: MEDICARE

## 2025-04-14 VITALS
BODY MASS INDEX: 20.96 KG/M2 | HEART RATE: 67 BPM | HEIGHT: 64 IN | WEIGHT: 122.8 LBS | SYSTOLIC BLOOD PRESSURE: 134 MMHG | OXYGEN SATURATION: 93 % | TEMPERATURE: 98.1 F | DIASTOLIC BLOOD PRESSURE: 76 MMHG

## 2025-04-14 DIAGNOSIS — J44.9 STAGE 3 SEVERE COPD BY GOLD CLASSIFICATION (HCC): Primary | ICD-10-CM

## 2025-04-14 DIAGNOSIS — J44.9 STAGE 3 SEVERE COPD BY GOLD CLASSIFICATION (HCC): ICD-10-CM

## 2025-04-14 DIAGNOSIS — J96.11 CHRONIC RESPIRATORY FAILURE WITH HYPOXIA: ICD-10-CM

## 2025-04-14 DIAGNOSIS — F17.210 CIGARETTE NICOTINE DEPENDENCE WITHOUT COMPLICATION: ICD-10-CM

## 2025-04-14 PROCEDURE — 1090F PRES/ABSN URINE INCON ASSESS: CPT

## 2025-04-14 PROCEDURE — G8400 PT W/DXA NO RESULTS DOC: HCPCS

## 2025-04-14 PROCEDURE — 1160F RVW MEDS BY RX/DR IN RCRD: CPT

## 2025-04-14 PROCEDURE — 1123F ACP DISCUSS/DSCN MKR DOCD: CPT

## 2025-04-14 PROCEDURE — 1159F MED LIST DOCD IN RCRD: CPT

## 2025-04-14 PROCEDURE — 94618 PULMONARY STRESS TESTING: CPT

## 2025-04-14 PROCEDURE — G8427 DOCREV CUR MEDS BY ELIG CLIN: HCPCS

## 2025-04-14 PROCEDURE — 3075F SYST BP GE 130 - 139MM HG: CPT

## 2025-04-14 PROCEDURE — 4004F PT TOBACCO SCREEN RCVD TLK: CPT

## 2025-04-14 PROCEDURE — G8420 CALC BMI NORM PARAMETERS: HCPCS

## 2025-04-14 PROCEDURE — 3078F DIAST BP <80 MM HG: CPT

## 2025-04-14 PROCEDURE — 99214 OFFICE O/P EST MOD 30 MIN: CPT

## 2025-04-14 PROCEDURE — 3023F SPIROM DOC REV: CPT

## 2025-04-14 RX ORDER — ALBUTEROL SULFATE 90 UG/1
1-2 INHALANT RESPIRATORY (INHALATION) EVERY 4 HOURS PRN
Qty: 8.5 EACH | Refills: 11 | Status: SHIPPED | OUTPATIENT
Start: 2025-04-14

## 2025-04-14 RX ORDER — BUDESONIDE, GLYCOPYRROLATE, AND FORMOTEROL FUMARATE 160; 9; 4.8 UG/1; UG/1; UG/1
2 AEROSOL, METERED RESPIRATORY (INHALATION) 2 TIMES DAILY
Qty: 10.7 EACH | Refills: 11 | Status: SHIPPED | OUTPATIENT
Start: 2025-04-14

## 2025-04-14 RX ORDER — ALBUTEROL SULFATE 0.83 MG/ML
2.5 SOLUTION RESPIRATORY (INHALATION) EVERY 4 HOURS PRN
Qty: 225 ML | Refills: 11 | Status: SHIPPED | OUTPATIENT
Start: 2025-04-14

## 2025-04-14 ASSESSMENT — ENCOUNTER SYMPTOMS
ALLERGIC/IMMUNOLOGIC NEGATIVE: 1
RESPIRATORY NEGATIVE: 1